# Patient Record
Sex: FEMALE | Race: WHITE | NOT HISPANIC OR LATINO | Employment: OTHER | URBAN - NONMETROPOLITAN AREA
[De-identification: names, ages, dates, MRNs, and addresses within clinical notes are randomized per-mention and may not be internally consistent; named-entity substitution may affect disease eponyms.]

---

## 2017-01-04 DIAGNOSIS — I73.9 PVD (PERIPHERAL VASCULAR DISEASE) (HCC): Primary | ICD-10-CM

## 2017-01-04 DIAGNOSIS — I65.23 BILATERAL CAROTID ARTERY STENOSIS: ICD-10-CM

## 2017-01-07 ENCOUNTER — TRANSCRIBE ORDERS (OUTPATIENT)
Dept: CARDIAC SURGERY | Facility: CLINIC | Age: 82
End: 2017-01-07

## 2017-01-07 DIAGNOSIS — I65.23 BILATERAL CAROTID ARTERY OCCLUSION: ICD-10-CM

## 2017-01-07 DIAGNOSIS — I73.9 PERIPHERAL VASCULAR DISEASE, UNSPECIFIED (HCC): Primary | ICD-10-CM

## 2017-01-10 ENCOUNTER — HOSPITAL ENCOUNTER (INPATIENT)
Dept: OTHER | Facility: HOSPITAL | Age: 82
LOS: 10 days | Discharge: HOME HEALTH | End: 2017-01-20
Attending: FAMILY MEDICINE | Admitting: HOSPITALIST

## 2017-01-11 LAB
ANION GAP SERPL CALCULATED.3IONS-SCNC: 6 MMOL/L (ref 5–15)
BASOPHILS # BLD AUTO: 0.04 X1000/UL (ref 0–0.2)
BASOPHILS NFR BLD AUTO: 0.4 % (ref 0–2)
BUN SERPL-MCNC: 10 MG/DL (ref 7–21)
CALCIUM SERPL-MCNC: 8.1 MG/DL (ref 8.4–10.2)
CHLORIDE SERPL-SCNC: 96 MMOL/L (ref 95–110)
CO2 SERPL-SCNC: 28 MMOL/L (ref 22–31)
CONV AUTO IG#: 0.08 X1000/UL (ref 0.01–0.02)
CONV AUTO IG%: 0.9 % (ref 0–0.5)
CONV TOTAL COUNTED: 100
CREAT SERPL-MCNC: 0.5 MG/DL (ref 0.5–1)
EOSINOPHIL # BLD AUTO: 0.49 X1000/UL (ref 0–0.7)
EOSINOPHIL NFR BLD AUTO: 5.5 % (ref 0–7)
EOSINOPHIL NFR BLD MANUAL: 4 % (ref 0–7)
ERYTHROCYTE [DISTWIDTH] IN BLOOD: 12.3 % (ref 11.5–14.5)
GLUCOSE SERPL-MCNC: 120 MG/DL (ref 60–100)
GRANULOCYTES # BLD AUTO: 5.54 X1000/UL (ref 2–8.6)
GRANULOCYTES NFR BLD AUTO: 62.4 % (ref 37–80)
HCT VFR BLD CALC: 27.9 % (ref 35–45)
HGB BLD-MCNC: 9.8 GM/DL (ref 12–15.5)
LYMPHOCYTES # BLD AUTO: 1.71 X1000/UL (ref 0.6–4.2)
LYMPHOCYTES NFR BLD AUTO: 19.2 % (ref 10–50)
LYMPHOCYTES NFR BLD MANUAL: 20 % (ref 10–50)
Lab: NORMAL
MCH RBC QN: 31.6 PG (ref 26–34)
MCHC RBC-ENTMCNC: 35.1 GM/DL (ref 31.4–36)
MCV RBC: 90 FL (ref 80–98)
MONOCYTES # BLD AUTO: 1.03 X1000/UL (ref 0–0.9)
MONOCYTES NFR BLD AUTO: 11.6 % (ref 0–12)
MONOCYTES NFR BLD MANUAL: 10 % (ref 0–12)
NEUTS BAND NFR BLD MANUAL: 5 % (ref 3–5)
NEUTS SEG NFR BLD MANUAL: 61 % (ref 37–80)
NRBC BLD AUTO-RTO: 0 % (ref 0–0.2)
NRBC SPEC MANUAL: 0 X1000/UL
PLATELET # BLD: 381 X1000/MM3 (ref 150–450)
PLATELET BLD QL SMEAR: NORMAL
PMV BLD: 9.2 FL (ref 8–12)
POTASSIUM SERPL-SCNC: 4 MMOL/L (ref 3.5–5.1)
RBC # BLD: 3.1 MEGA/MM3 (ref 3.77–5.16)
RBC MORPH BLD: NORMAL
SODIUM SERPL-SCNC: 130 MMOL/L (ref 137–145)
WBC # BLD: 8.9 X1000/UL (ref 3.2–9.8)

## 2017-01-12 LAB
ANION GAP SERPL CALCULATED.3IONS-SCNC: 8 MMOL/L (ref 5–15)
BASOPHILS # BLD AUTO: 0.04 X1000/UL (ref 0–0.2)
BASOPHILS NFR BLD AUTO: 0.4 % (ref 0–2)
BUN SERPL-MCNC: 8 MG/DL (ref 7–21)
CALCIUM SERPL-MCNC: 8.2 MG/DL (ref 8.4–10.2)
CHLORIDE SERPL-SCNC: 99 MMOL/L (ref 95–110)
CO2 SERPL-SCNC: 26 MMOL/L (ref 22–31)
CONV AUTO IG#: 0.13 X1000/UL (ref 0.01–0.02)
CONV AUTO IG%: 1.2 % (ref 0–0.5)
CONV TOTAL COUNTED: 100
CREAT SERPL-MCNC: 0.5 MG/DL (ref 0.5–1)
EOSINOPHIL # BLD AUTO: 0.37 X1000/UL (ref 0–0.7)
EOSINOPHIL NFR BLD AUTO: 3.4 % (ref 0–7)
EOSINOPHIL NFR BLD MANUAL: 3 % (ref 0–7)
ERYTHROCYTE [DISTWIDTH] IN BLOOD: 12.2 % (ref 11.5–14.5)
GLUCOSE SERPL-MCNC: 110 MG/DL (ref 60–100)
GRANULOCYTES # BLD AUTO: 7.45 X1000/UL (ref 2–8.6)
GRANULOCYTES NFR BLD AUTO: 68 % (ref 37–80)
HCT VFR BLD CALC: 31 % (ref 35–45)
HGB BLD-MCNC: 10.8 GM/DL (ref 12–15.5)
LYMPHOCYTES # BLD AUTO: 1.6 X1000/UL (ref 0.6–4.2)
LYMPHOCYTES NFR BLD AUTO: 14.6 % (ref 10–50)
LYMPHOCYTES NFR BLD MANUAL: 15 % (ref 10–50)
MCH RBC QN: 31.1 PG (ref 26–34)
MCHC RBC-ENTMCNC: 34.8 GM/DL (ref 31.4–36)
MCV RBC: 89.3 FL (ref 80–98)
MONOCYTES # BLD AUTO: 1.36 X1000/UL (ref 0–0.9)
MONOCYTES NFR BLD AUTO: 12.4 % (ref 0–12)
MONOCYTES NFR BLD MANUAL: 6 % (ref 0–12)
MYELOCYTES NFR BLD MANUAL: 1 % (ref 0–0)
NEUTS SEG NFR BLD MANUAL: 75 % (ref 37–80)
PLATELET # BLD: 439 X1000/MM3 (ref 150–450)
PLATELET BLD QL SMEAR: NORMAL
PMV BLD: 9.3 FL (ref 8–12)
POTASSIUM SERPL-SCNC: 3.9 MMOL/L (ref 3.5–5.1)
RBC # BLD: 3.47 MEGA/MM3 (ref 3.77–5.16)
RBC MORPH BLD: ABNORMAL
SODIUM SERPL-SCNC: 133 MMOL/L (ref 137–145)
WBC # BLD: 11 X1000/UL (ref 3.2–9.8)

## 2017-01-13 LAB
ANION GAP SERPL CALCULATED.3IONS-SCNC: 10 MMOL/L (ref 5–15)
BASOPHILS # BLD AUTO: 0.03 X1000/UL (ref 0–0.2)
BASOPHILS NFR BLD AUTO: 0.3 % (ref 0–2)
BASOPHILS NFR BLD MANUAL: 1 % (ref 0–2)
BUN SERPL-MCNC: 9 MG/DL (ref 7–21)
CALCIUM SERPL-MCNC: 8.4 MG/DL (ref 8.4–10.2)
CHLORIDE SERPL-SCNC: 98 MMOL/L (ref 95–110)
CO2 SERPL-SCNC: 26 MMOL/L (ref 22–31)
CONV AUTO IG#: 0.14 X1000/UL (ref 0.01–0.02)
CONV AUTO IG%: 1.4 % (ref 0–0.5)
CONV TOTAL COUNTED: 100
CREAT SERPL-MCNC: 0.6 MG/DL (ref 0.5–1)
EOSINOPHIL # BLD AUTO: 0.35 X1000/UL (ref 0–0.7)
EOSINOPHIL NFR BLD AUTO: 3.4 % (ref 0–7)
EOSINOPHIL NFR BLD MANUAL: 2 % (ref 0–7)
ERYTHROCYTE [DISTWIDTH] IN BLOOD: 12.3 % (ref 11.5–14.5)
GLUCOSE SERPL-MCNC: 121 MG/DL (ref 60–100)
GRANULOCYTES # BLD AUTO: 7.26 X1000/UL (ref 2–8.6)
GRANULOCYTES NFR BLD AUTO: 71.1 % (ref 37–80)
HCT VFR BLD CALC: 29.4 % (ref 35–45)
HGB BLD-MCNC: 10.1 GM/DL (ref 12–15.5)
LYMPHOCYTES # BLD AUTO: 1.34 X1000/UL (ref 0.6–4.2)
LYMPHOCYTES NFR BLD AUTO: 13.1 % (ref 10–50)
LYMPHOCYTES NFR BLD MANUAL: 10 % (ref 10–50)
MCH RBC QN: 31 PG (ref 26–34)
MCHC RBC-ENTMCNC: 34.4 GM/DL (ref 31.4–36)
MCV RBC: 90.2 FL (ref 80–98)
MONOCYTES # BLD AUTO: 1.09 X1000/UL (ref 0–0.9)
MONOCYTES NFR BLD AUTO: 10.7 % (ref 0–12)
MONOCYTES NFR BLD MANUAL: 4 % (ref 0–12)
NEUTS BAND NFR BLD MANUAL: 1 % (ref 3–5)
NEUTS SEG NFR BLD MANUAL: 80 % (ref 37–80)
PLATELET # BLD: 425 X1000/MM3 (ref 150–450)
PLATELET BLD QL SMEAR: NORMAL
PMV BLD: 8.5 FL (ref 8–12)
POTASSIUM SERPL-SCNC: 3.9 MMOL/L (ref 3.5–5.1)
RBC # BLD: 3.26 MEGA/MM3 (ref 3.77–5.16)
RBC MORPH BLD: ABNORMAL
SODIUM SERPL-SCNC: 134 MMOL/L (ref 137–145)
VARIANT LYMPHS NFR BLD MANUAL: 2 % (ref 0–0)
WBC # BLD: 10.2 X1000/UL (ref 3.2–9.8)

## 2017-01-19 LAB
ALBUMIN SERPL-MCNC: 3.5 GM/DL (ref 3.4–4.8)
ALP SERPL-CCNC: 73 U/L (ref 38–126)
ALT SERPL-CCNC: 50 U/L (ref 9–52)
ANION GAP SERPL CALCULATED.3IONS-SCNC: 11 MMOL/L (ref 5–15)
AST SERPL-CCNC: 36 U/L (ref 14–36)
BASOPHILS # BLD AUTO: 0.04 X1000/UL (ref 0–0.2)
BASOPHILS NFR BLD AUTO: 0.5 % (ref 0–2)
BILIRUB SERPL-MCNC: 0.4 MG/DL (ref 0.2–1.3)
BUN SERPL-MCNC: 12 MG/DL (ref 7–21)
CALCIUM SERPL-MCNC: 9.1 MG/DL (ref 8.4–10.2)
CHLORIDE SERPL-SCNC: 95 MMOL/L (ref 95–110)
CO2 SERPL-SCNC: 27 MMOL/L (ref 22–31)
CONV AUTO IG#: 0.22 X1000/UL (ref 0.01–0.02)
CONV AUTO IG%: 2.7 % (ref 0–0.5)
CREAT SERPL-MCNC: 0.7 MG/DL (ref 0.5–1)
EOSINOPHIL # BLD AUTO: 0.42 X1000/UL (ref 0–0.7)
EOSINOPHIL NFR BLD AUTO: 5.1 % (ref 0–7)
ERYTHROCYTE [DISTWIDTH] IN BLOOD: 13 % (ref 11.5–14.5)
GLUCOSE SERPL-MCNC: 116 MG/DL (ref 60–100)
GRANULOCYTES # BLD AUTO: 5.19 X1000/UL (ref 2–8.6)
GRANULOCYTES NFR BLD AUTO: 62.7 % (ref 37–80)
HCT VFR BLD CALC: 34.5 % (ref 35–45)
HGB BLD-MCNC: 11.6 GM/DL (ref 12–15.5)
LYMPHOCYTES # BLD AUTO: 1.49 X1000/UL (ref 0.6–4.2)
LYMPHOCYTES NFR BLD AUTO: 18 % (ref 10–50)
MCH RBC QN: 30.9 PG (ref 26–34)
MCHC RBC-ENTMCNC: 33.6 GM/DL (ref 31.4–36)
MCV RBC: 91.8 FL (ref 80–98)
MONOCYTES # BLD AUTO: 0.91 X1000/UL (ref 0–0.9)
MONOCYTES NFR BLD AUTO: 11 % (ref 0–12)
PLATELET # BLD: 544 X1000/MM3 (ref 150–450)
PMV BLD: 8.9 FL (ref 8–12)
POTASSIUM SERPL-SCNC: 4.3 MMOL/L (ref 3.5–5.1)
PROT SERPL-MCNC: 6.4 GM/DL (ref 6.3–8.6)
RBC # BLD: 3.76 MEGA/MM3 (ref 3.77–5.16)
SODIUM SERPL-SCNC: 133 MMOL/L (ref 137–145)
WBC # BLD: 8.3 X1000/UL (ref 3.2–9.8)

## 2017-01-20 VITALS — WEIGHT: 132.19 LBS | BODY MASS INDEX: 24.96 KG/M2 | HEIGHT: 61 IN

## 2017-01-20 PROBLEM — Z87.81 S/P LEFT HIP FRACTURE: Status: ACTIVE | Noted: 2017-01-20

## 2017-01-20 PROBLEM — Z96.642 HISTORY OF HEMIARTHROPLASTY OF LEFT HIP: Status: ACTIVE | Noted: 2017-01-20

## 2017-01-20 PROBLEM — Z72.0 TOBACCO USE: Chronic | Status: ACTIVE | Noted: 2017-01-20

## 2017-01-20 PROBLEM — I25.10 CAD (CORONARY ARTERY DISEASE): Chronic | Status: ACTIVE | Noted: 2017-01-20

## 2017-01-20 PROBLEM — I10 HTN (HYPERTENSION): Chronic | Status: ACTIVE | Noted: 2017-01-20

## 2017-01-20 PROBLEM — M81.0 OSTEOPOROSIS: Chronic | Status: ACTIVE | Noted: 2017-01-20

## 2017-01-20 RX ORDER — MAGNESIUM HYDROXIDE/ALUMINUM HYDROXICE/SIMETHICONE 120; 1200; 1200 MG/30ML; MG/30ML; MG/30ML
30 SUSPENSION ORAL EVERY 4 HOURS PRN
Status: DISCONTINUED | OUTPATIENT
Start: 2017-01-21 | End: 2017-01-20 | Stop reason: HOSPADM

## 2017-01-20 RX ORDER — POLYETHYLENE GLYCOL 3350 17 G/17G
17 POWDER, FOR SOLUTION ORAL 2 TIMES DAILY
Status: DISCONTINUED | OUTPATIENT
Start: 2017-01-21 | End: 2017-01-20 | Stop reason: HOSPADM

## 2017-01-20 RX ORDER — HYDRALAZINE HYDROCHLORIDE 50 MG/1
50 TABLET, FILM COATED ORAL 3 TIMES DAILY
Status: DISCONTINUED | OUTPATIENT
Start: 2017-01-21 | End: 2017-01-20 | Stop reason: HOSPADM

## 2017-01-20 RX ORDER — DOCUSATE SODIUM 100 MG/1
100 CAPSULE, LIQUID FILLED ORAL 3 TIMES DAILY
Status: DISCONTINUED | OUTPATIENT
Start: 2017-01-21 | End: 2017-01-20 | Stop reason: HOSPADM

## 2017-01-20 RX ORDER — HYDRALAZINE HYDROCHLORIDE 20 MG/ML
10 INJECTION INTRAMUSCULAR; INTRAVENOUS EVERY 6 HOURS PRN
Status: DISCONTINUED | OUTPATIENT
Start: 2017-01-21 | End: 2017-01-20 | Stop reason: HOSPADM

## 2017-01-20 RX ORDER — METOPROLOL TARTRATE 50 MG/1
50 TABLET, FILM COATED ORAL 2 TIMES DAILY WITH MEALS
Status: DISCONTINUED | OUTPATIENT
Start: 2017-01-21 | End: 2017-01-20 | Stop reason: HOSPADM

## 2017-01-20 RX ORDER — NICOTINE 21 MG/24HR
1 PATCH, TRANSDERMAL 24 HOURS TRANSDERMAL EVERY 24 HOURS
Status: DISCONTINUED | OUTPATIENT
Start: 2017-01-21 | End: 2017-01-20 | Stop reason: HOSPADM

## 2017-01-20 RX ORDER — SODIUM CHLORIDE 0.9 % (FLUSH) 0.9 %
3 SYRINGE (ML) INJECTION EVERY 12 HOURS SCHEDULED
Status: DISCONTINUED | OUTPATIENT
Start: 2017-01-21 | End: 2017-01-20 | Stop reason: HOSPADM

## 2017-01-20 RX ORDER — AMLODIPINE BESYLATE 10 MG/1
10 TABLET ORAL DAILY
Refills: 11 | COMMUNITY
Start: 2016-10-21 | End: 2017-02-21 | Stop reason: ALTCHOICE

## 2017-01-20 RX ORDER — HYDROCODONE BITARTRATE AND ACETAMINOPHEN 10; 325 MG/1; MG/1
1 TABLET ORAL EVERY 4 HOURS PRN
Status: DISCONTINUED | OUTPATIENT
Start: 2017-01-21 | End: 2017-01-20 | Stop reason: HOSPADM

## 2017-01-20 RX ORDER — ACETAMINOPHEN 650 MG/1
650 SUPPOSITORY RECTAL EVERY 6 HOURS PRN
Status: DISCONTINUED | OUTPATIENT
Start: 2017-01-21 | End: 2017-01-20 | Stop reason: HOSPADM

## 2017-01-20 RX ORDER — ISOSORBIDE MONONITRATE 30 MG/1
30 TABLET, EXTENDED RELEASE ORAL
Status: DISCONTINUED | OUTPATIENT
Start: 2017-01-21 | End: 2017-01-20 | Stop reason: HOSPADM

## 2017-01-20 RX ORDER — ACETAMINOPHEN 325 MG/1
650 TABLET ORAL EVERY 6 HOURS PRN
Status: DISCONTINUED | OUTPATIENT
Start: 2017-01-21 | End: 2017-01-20 | Stop reason: HOSPADM

## 2017-01-20 RX ORDER — ALBUTEROL SULFATE 2.5 MG/3ML
2.5 SOLUTION RESPIRATORY (INHALATION) EVERY 4 HOURS PRN
Status: DISCONTINUED | OUTPATIENT
Start: 2017-01-21 | End: 2017-01-20 | Stop reason: HOSPADM

## 2017-01-20 RX ORDER — CHLORAL HYDRATE 500 MG
1000 CAPSULE ORAL DAILY
COMMUNITY

## 2017-01-20 RX ORDER — DIPHENHYDRAMINE HCL 25 MG
25 TABLET ORAL 3 TIMES DAILY PRN
Status: DISCONTINUED | OUTPATIENT
Start: 2017-01-21 | End: 2017-01-20 | Stop reason: HOSPADM

## 2017-01-20 RX ORDER — LORATADINE 10 MG/1
10 CAPSULE, LIQUID FILLED ORAL DAILY PRN
COMMUNITY

## 2017-01-20 RX ORDER — CLONIDINE HYDROCHLORIDE 0.1 MG/1
0.1 TABLET ORAL EVERY 12 HOURS SCHEDULED
Status: DISCONTINUED | OUTPATIENT
Start: 2017-01-21 | End: 2017-01-20 | Stop reason: HOSPADM

## 2017-01-20 RX ORDER — ONDANSETRON 2 MG/ML
4 INJECTION INTRAMUSCULAR; INTRAVENOUS EVERY 6 HOURS PRN
Status: DISCONTINUED | OUTPATIENT
Start: 2017-01-21 | End: 2017-01-20 | Stop reason: HOSPADM

## 2017-01-20 RX ORDER — LANOLIN ALCOHOL/MO/W.PET/CERES
1000 CREAM (GRAM) TOPICAL DAILY
COMMUNITY

## 2017-01-20 RX ORDER — GUAIFENESIN/DEXTROMETHORPHAN 100-10MG/5
10 SYRUP ORAL EVERY 6 HOURS PRN
Status: DISCONTINUED | OUTPATIENT
Start: 2017-01-21 | End: 2017-01-20 | Stop reason: HOSPADM

## 2017-01-20 RX ORDER — SODIUM CHLORIDE 0.9 % (FLUSH) 0.9 %
3 SYRINGE (ML) INJECTION AS NEEDED
Status: DISCONTINUED | OUTPATIENT
Start: 2017-01-21 | End: 2017-01-20 | Stop reason: HOSPADM

## 2017-01-30 ENCOUNTER — OFFICE VISIT (OUTPATIENT)
Dept: ORTHOPEDIC SURGERY | Facility: CLINIC | Age: 82
End: 2017-01-30

## 2017-01-30 VITALS — WEIGHT: 132 LBS | HEIGHT: 61 IN | BODY MASS INDEX: 24.92 KG/M2

## 2017-01-30 DIAGNOSIS — Z87.81 S/P LEFT HIP FRACTURE: Primary | ICD-10-CM

## 2017-01-30 DIAGNOSIS — Z96.642 HISTORY OF HEMIARTHROPLASTY OF LEFT HIP: ICD-10-CM

## 2017-01-30 PROCEDURE — 99024 POSTOP FOLLOW-UP VISIT: CPT | Performed by: ORTHOPAEDIC SURGERY

## 2017-01-30 PROCEDURE — 73502 X-RAY EXAM HIP UNI 2-3 VIEWS: CPT | Performed by: ORTHOPAEDIC SURGERY

## 2017-01-30 RX ORDER — HYDRALAZINE HYDROCHLORIDE 25 MG/1
50 TABLET, FILM COATED ORAL 3 TIMES DAILY
COMMUNITY
Start: 2017-01-24

## 2017-01-30 RX ORDER — HYDROCODONE BITARTRATE AND ACETAMINOPHEN 10; 325 MG/1; MG/1
TABLET ORAL
Refills: 0 | COMMUNITY
Start: 2017-01-21 | End: 2017-03-31

## 2017-01-30 RX ORDER — CLONIDINE HYDROCHLORIDE 0.2 MG/1
TABLET ORAL
Refills: 0 | COMMUNITY
Start: 2017-01-20 | End: 2021-10-06

## 2017-01-30 NOTE — PROGRESS NOTES
Procedure   Procedures      xr hip 2v- normal alignment, femoral stem well positioned.  Leg lengths correct.

## 2017-01-30 NOTE — PROGRESS NOTES
Minerva Madrid is a 81 y.o. female is s/p       Chief Complaint   Patient presents with   • Left Hip - Follow-up       HISTORY OF PRESENT ILLNESS:Hemiarthroplasty of the left hip.1/5/17  Ambulating with the therapist.        Allergies   Allergen Reactions   • Amoxicillin    • Erythromycin Base    • Iodine    • Shellfish-Derived Products    • Simvastatin Myalgia         Current Outpatient Prescriptions:   •  amLODIPine (NORVASC) 10 MG tablet, Take 10 mg by mouth Daily., Disp: , Rfl: 11  •  aspirin 81 MG tablet, Take 81 mg by mouth Daily., Disp: , Rfl:   •  CloNIDine (CATAPRES) 0.2 MG tablet, TAKE 1 TABLET BY MOUTH TWICE A DAY, Disp: , Rfl: 0  •  clopidogrel (PLAVIX) 75 MG tablet, Take 75 mg by mouth Daily., Disp: , Rfl:   •  fexofenadine (ALLEGRA) 180 MG tablet, Take 180 mg by mouth Daily., Disp: , Rfl:   •  hydrALAZINE (APRESOLINE) 25 MG tablet, , Disp: , Rfl:   •  HYDROcodone-acetaminophen (NORCO)  MG per tablet, TAKE 1 TABLET BY MOUTH EVERY 6 HOURS AS NEEDED, Disp: , Rfl: 0  •  isosorbide mononitrate (IMDUR) 30 MG 24 hr tablet, Take 30 mg by mouth Daily., Disp: , Rfl:   •  Loratadine 10 MG capsule, Take 10 mg by mouth Daily As Needed. Allergy relief, Disp: , Rfl:   •  metFORMIN (GLUCOPHAGE) 500 MG tablet, Take 500 mg by mouth 2 (Two) Times a Day With Meals., Disp: , Rfl:   •  metoprolol tartrate (LOPRESSOR) 25 MG tablet, Take 25 mg by mouth 2 (Two) Times a Day., Disp: , Rfl: 12  •  METOPROLOL TARTRATE PO, Take 50 mg by mouth 2 (Two) Times a Day., Disp: , Rfl:   •  Omega-3 Fatty Acids (OMEGA-3 FISH OIL) 1000 MG capsule, Take 1,000 mg by mouth Daily., Disp: , Rfl:   •  valsartan-hydrochlorothiazide (DIOVAN-HCT) 160-12.5 MG per tablet, Take 1 tablet by mouth 2 (Two) Times a Day. 1 tablet in morning; .5 tablet in evening, Disp: , Rfl:   •  vitamin B-12 (CYANOCOBALAMIN) 1000 MCG tablet, Place 1,000 mcg under the tongue Daily., Disp: , Rfl:     No fevers or chills.  No nausea or vomiting.      PHYSICAL  EXAMINATION:       Foster Madrid is a 81 y.o. female    Patient is awake and alert, answers questions appropriately and is in no apparent distress.    GAIT:     []  Normal  []  Antalgic    Assistive device: []  None  []  Walker     []  Crutches  []  Cane     []  Wheelchair  []  Stretcher    Ortho Exam    Leg lengths correct.  3/5 left hip flexors. No pain with passive hip range of motion of the left hip.     Xr Hip 2+ Vw Left    Result Date: 1/5/2017  Narrative: [facility]  Patient Name- FOSTER MADRID  ORDERING- LEXY SAMSON  ATTENDING- KRISTOPHER YOUNG MD  REFERRING-    ,     -----------------------  Procedure- HIP INC PELVIS 1 VIEW LT.  History- surgery.  Comparison- None  Findings- Single frontal view of the left hip was obtained. A left hip prosthesis has been placed. There is postoperative gas adjacent to the greater trochanter and the femoral neck. Cutaneous staples overlie the lateral aspect of the hip. A Saldivar catheter is visualized. There is no radiographic evidence of acute fracture or hardware consultation.  Impression- Left hip prosthesis has been placed.  Electronically Signed By- Epifanio Bailey MD On: 2017-01-05 18:31:08    Xr Femur 2 View Left    Result Date: 1/4/2017  Narrative: Exam- Left femur two views  Indication- Status post fall  Findings- Two views. There is a displaced subcapital femoral neck fracture. There is slight superior displacement of the femur in relation to the femoral head. No obvious lytic or destructive lesion.  Impression- Displaced subcapital femoral neck fracture  Electronically Signed By- Pa Salvador MD On: 2017-01-04 01:31:19    Xr Chest 1 View    Result Date: 1/4/2017  Narrative: Exam- AP portable chest  Indication- Chest pain  Comparison- 1/27/2016  Findings- AP portable chest. The bony structures are intact. The cardiomediastinal silhouette is unremarkable. Plaque is present in the aorta. Ossified right paratracheal node. There is a  granuloma present in the right upper lung. Lungs otherwise are clear. No pneumothorax or pleural effusion.  Impression- No acute cardiopulmonary abnormality.  Electronically Signed By- Pa Salvador MD On: 2017-01-04 01:29:47          ASSESSMENT:    Diagnoses and all orders for this visit:    S/p left hip fracture    History of hemiarthroplasty of left hip    Other orders  -     CloNIDine (CATAPRES) 0.2 MG tablet; TAKE 1 TABLET BY MOUTH TWICE A DAY  -     hydrALAZINE (APRESOLINE) 25 MG tablet;   -     HYDROcodone-acetaminophen (NORCO)  MG per tablet; TAKE 1 TABLET BY MOUTH EVERY 6 HOURS AS NEEDED          PLAN    Reviewed posterior hip precautions, sitting too close to the floor, etc.  Ambulate as tolerated.  Reassess in 2 months.      Chauncey Kerns MD

## 2017-01-30 NOTE — MR AVS SNAPSHOT
Minerva Madrid   1/30/2017 10:30 AM   Office Visit    Dept Phone:  [phone]   Encounter #:  [id number]    Provider:  Chauncey Kerns MD   Department:  [facility] ORTHOPEDICS                Your Full Care Plan              Today's Medication Changes          These changes are accurate as of: 1/30/17 12:24 PM.  If you have any questions, ask your nurse or doctor.               Stop taking medication(s)listed here:     KAPVAY 0.1 MG tablet sustained-release 12 hour   Generic drug:  CloNIDine HCl ER   Stopped by:  Chauncey Kerns MD                      Your Updated Medication List          This list is accurate as of: 1/30/17 12:24 PM.  Always use your most recent med list.                amLODIPine 10 MG tablet   Commonly known as:  NORVASC       aspirin 81 MG tablet       CloNIDine 0.2 MG tablet   Commonly known as:  CATAPRES       clopidogrel 75 MG tablet   Commonly known as:  PLAVIX       fexofenadine 180 MG tablet   Commonly known as:  ALLEGRA       hydrALAZINE 25 MG tablet   Commonly known as:  APRESOLINE       HYDROcodone-acetaminophen  MG per tablet   Commonly known as:  NORCO       isosorbide mononitrate 30 MG 24 hr tablet   Commonly known as:  IMDUR       Loratadine 10 MG capsule       metFORMIN 500 MG tablet   Commonly known as:  GLUCOPHAGE       * METOPROLOL TARTRATE PO       * metoprolol tartrate 25 MG tablet   Commonly known as:  LOPRESSOR       Omega-3 Fish Oil 1000 MG capsule       valsartan-hydrochlorothiazide 160-12.5 MG per tablet   Commonly known as:  DIOVAN-HCT       vitamin B-12 1000 MCG tablet   Commonly known as:  CYANOCOBALAMIN       * Notice:  This list has 2 medication(s) that are the same as other medications prescribed for you. Read the directions carefully, and ask your doctor or other care provider to review them with you.            You Were Diagnosed With        Codes Comments    S/p left hip fracture    -   Primary ICD-10-CM: Z87.81  ICD-9-CM: V15.51     History of hemiarthroplasty of left hip     ICD-10-CM: Z96.642  ICD-9-CM: V43.64       Instructions     None    Patient Instructions History      Upcoming Appointments     Visit Type Date Time Department    POST-OP 1/30/2017 10:30 AM [facility] ORTHOPEDIC [facility]AD    XR MAD HIP W OR WO PEL 2-3 VIEW LEFT 1/30/2017 11:00 AM [facility] XRAY ORTHOCARE    BH MAD VASCULAR VT 2/16/2017  1:00 PM [facility] CT VAS SURGERY MAD    OFFICE VISIT 2/21/2017  2:20 PM [facility] CT VAS SURGERY MAD    BH MAD IDALIA 2/21/2017  1:30 PM [facility] CT VAS SURGERY MAD    BH MAD VASCULAR VT 2/21/2017  2:00 PM [facility] CT VAS SURGERY MAD      MyChart Signup     Our records indicate that you have declined [facility] [employer] signup. If you would like to sign up for [employer], please email [email or web address] or call [phone] to obtain an activation code.             Other Info from Your Visit           Your Appointments     Feb 16, 2017  1:00 PM CST   VASCULAR ULTRASOUND VISIT with MAD HVC IDALIA ROOM   [facility] CARDIOTHORACIC AND VASCULAR SURGERY (--)    [address] [place] [address] [address]  [facility] [address] [place] [id number] [phone]            Feb 21, 2017  1:30 PM CST   VASCULAR ULTRASOUND VISIT with MAD HVC VAS ROOM   [facility] CARDIOTHORACIC AND VASCULAR SURGERY (--)    [address] [place] [address] [address]  [facility] [address] [place] [id number] [phone]            Feb 21, 2017  2:00 PM CST   VASCULAR ULTRASOUND VISIT with MAD HVC VAS ROOM   [facility] CARDIOTHORACIC AND VASCULAR SURGERY (--)    [address] [place] [address] [address]  [facility] [address] [place] [id number] [phone]            Feb 21, 2017  2:20 PM CST   Office Visit with Jack L Hamman, MD   [facility] CARDIOTHORACIC AND VASCULAR SURGERY (--)    [address] [place] [address] [address]  [facility] [address] [place] [id number] [phone]           Arrive 15 minutes prior to appointment.             "  Allergies     Amoxicillin Allergy     Erythromycin Base Allergy     Iodine Allergy     Shellfish-derived Products Allergy     Simvastatin Allergy Myalgia      Reason for Visit     Left Hip - Follow-up           Vital Signs     Height Weight Body Mass Index Smoking Status          61\" (154.9 cm) 132 lb (59.9 kg) 24.94 kg/m2 Never Assessed        Problems and Diagnoses Noted     History of hemiarthroplasty of left hip    S/p left hip fracture        "

## 2017-02-21 ENCOUNTER — OFFICE VISIT (OUTPATIENT)
Dept: CARDIAC SURGERY | Facility: CLINIC | Age: 82
End: 2017-02-21

## 2017-02-21 VITALS
HEART RATE: 64 BPM | DIASTOLIC BLOOD PRESSURE: 62 MMHG | SYSTOLIC BLOOD PRESSURE: 155 MMHG | BODY MASS INDEX: 24.73 KG/M2 | TEMPERATURE: 98.2 F | WEIGHT: 131 LBS | HEIGHT: 61 IN | OXYGEN SATURATION: 97 %

## 2017-02-21 DIAGNOSIS — I65.23 CAROTID STENOSIS, ASYMPTOMATIC, BILATERAL: ICD-10-CM

## 2017-02-21 DIAGNOSIS — Z72.0 TOBACCO USE: Primary | Chronic | ICD-10-CM

## 2017-02-21 PROCEDURE — 99213 OFFICE O/P EST LOW 20 MIN: CPT | Performed by: THORACIC SURGERY (CARDIOTHORACIC VASCULAR SURGERY)

## 2017-02-28 NOTE — PROGRESS NOTES
Minerva Madrid  1935            81 y.o.      2/21/2017    Chief Complaint   Patient presents with   • Carotid Artery Disease     1 year follow up    • Peripheral Vascular Disease     PCP Ariana Adkins D.O.  Cardiologist RICK Car  A she has been followed for diffuse vascular disease since 2012.  She has been an inveterate adult lifetime cigarette smoker      ROS retention, dyslipidemia, tobacco abuse    SH   Mastoid surgery years prior  3-27-08 CATARACT LEFT  4-29-09 CARDIAC CATHETERIZATION  6-4-09 RIGHT CAROTID ENDARTERECTOMY PATCH ANGIOPLASTY  7-13-09 CABG  HARRELL TO LAD, VEIN GRAFT POSTERIOR DESCENDING   HEMIARTHROPLASTY LEFT HIP    Current Outpatient Prescriptions:   •  aspirin 81 MG tablet, Take 81 mg by mouth Daily., Disp: , Rfl:   •  CloNIDine (CATAPRES) 0.2 MG tablet, TAKE 1 TABLET BY MOUTH TWICE A DAY, Disp: , Rfl: 0  •  clopidogrel (PLAVIX) 75 MG tablet, Take 75 mg by mouth Daily., Disp: , Rfl:   •  hydrALAZINE (APRESOLINE) 25 MG tablet, , Disp: , Rfl:   •  HYDROcodone-acetaminophen (NORCO)  MG per tablet, TAKE 1 TABLET BY MOUTH EVERY 6 HOURS AS NEEDED, Disp: , Rfl: 0  •  Loratadine 10 MG capsule, Take 10 mg by mouth Daily As Needed. Allergy relief, Disp: , Rfl:   •  metFORMIN (GLUCOPHAGE) 500 MG tablet, Take 500 mg by mouth 2 (Two) Times a Day With Meals., Disp: , Rfl:   •  METOPROLOL TARTRATE PO, Take 50 mg by mouth 2 (Two) Times a Day., Disp: , Rfl:   •  Omega-3 Fatty Acids (OMEGA-3 FISH OIL) 1000 MG capsule, Take 1,000 mg by mouth Daily., Disp: , Rfl:   •  valsartan-hydrochlorothiazide (DIOVAN-HCT) 160-12.5 MG per tablet, Take 1 tablet by mouth 2 (Two) Times a Day. 1 tablet in morning; .5 tablet in evening, Disp: , Rfl:   •  vitamin B-12 (CYANOCOBALAMIN) 1000 MCG tablet, Place 1,000 mcg under the tongue Daily., Disp: , Rfl:     The following portions of the patient's history were reviewed and updated as appropriate: allergies, current medications, past family history, past  "medical history, past social history, past surgical history and problem list.    Physical Exam  Visit Vitals   • /62 (BP Location: Right arm)   • Pulse 64   • Temp 98.2 °F (36.8 °C) (Oral)   • Ht 61\" (154.9 cm)   • Wt 131 lb (59.4 kg)   • SpO2 97%   • BMI 24.75 kg/m2       HEENT: Semi-vertical scar right neck previous right carotid endarterectomy 6-4-09 no masses soft bruits over both sides the neck    CHEST: Clear to auscultation    HEART: Well-healed midline sternotomy incision regular rate and rhythm    ABDOMEN: Flat nontender    EXTREMITIES: Pulses easily palpable dorsalis pedis arteries bilaterally    VASCULAR LAB STUDIES:CAROTID DUPLEX SCAN  (2-21-17)      RIGHT  0-49% (PRIOR RCE)    LEFT 50-69%                                                                        VERTEBRAL FLOW        Antegrade                                   Antegrade                     IDALIA (2-21-17)             RIGHT 0.79               LEFT 0.76  WAVE FORMS             PT     Triphasic                  Triphasic                                       DP      Triphasic                  Triphasic              RADIOLOGY:      Tobacco use [Z72.0]    IMPRESSION:  1.  Good durability right carotid endarterectomy 6/4/09  2.  Moderate stenosis left internal carotid artery asymptomatic with antegrade flow both vertebral arteries  3.  ABIs demonstrate mild to moderate reduction in the IDALIA ratio but with triphasic waveforms recorded throughout both lower extremities claudication and she has is not debilitating                Assessment/Plan  Minerva was seen today for carotid artery disease and peripheral vascular disease.    Diagnoses and all orders for this visit:    Tobacco use    Carotid stenosis, asymptomatic, bilateral                Plan   1.  Return in 1 year carotid duplex scan, IDALIA  2.  Patient once again encouraged to stop smoking but I doubt that will ever occur        "

## 2017-03-31 ENCOUNTER — OFFICE VISIT (OUTPATIENT)
Dept: ORTHOPEDIC SURGERY | Facility: CLINIC | Age: 82
End: 2017-03-31

## 2017-03-31 DIAGNOSIS — Z87.81 S/P LEFT HIP FRACTURE: Primary | ICD-10-CM

## 2017-03-31 DIAGNOSIS — Z96.642 HISTORY OF HEMIARTHROPLASTY OF LEFT HIP: ICD-10-CM

## 2017-03-31 PROCEDURE — 99024 POSTOP FOLLOW-UP VISIT: CPT | Performed by: ORTHOPAEDIC SURGERY

## 2017-03-31 RX ORDER — HYDROCODONE BITARTRATE AND ACETAMINOPHEN 7.5; 325 MG/1; MG/1
1 TABLET ORAL EVERY 6 HOURS PRN
Qty: 70 TABLET | Refills: 0 | Status: SHIPPED | OUTPATIENT
Start: 2017-03-31 | End: 2018-03-20

## 2017-03-31 NOTE — PROGRESS NOTES
Minerva Madrid is a 81 y.o. female returns for     Chief Complaint   Patient presents with   • Left Hip - Follow-up       HISTORY OF PRESENT ILLNESS: Hemiarthroplasty of the left hip.    done on 1/5/2017. Hip is doing good having some back pain.   She is ambulating without significant issues.   She has back pain, but no hip pain.      CONCURRENT MEDICAL HISTORY:    Past Medical History:   Diagnosis Date   • Essential hypertension    • Postmenopausal bleeding     With thickened endometrial stripe        Allergies   Allergen Reactions   • Amoxicillin    • Erythromycin Base    • Iodine    • Shellfish-Derived Products    • Simvastatin Myalgia         Current Outpatient Prescriptions:   •  aspirin 81 MG tablet, Take 81 mg by mouth Daily., Disp: , Rfl:   •  CloNIDine (CATAPRES) 0.2 MG tablet, TAKE 1 TABLET BY MOUTH TWICE A DAY, Disp: , Rfl: 0  •  clopidogrel (PLAVIX) 75 MG tablet, Take 75 mg by mouth Daily., Disp: , Rfl:   •  hydrALAZINE (APRESOLINE) 25 MG tablet, , Disp: , Rfl:   •  HYDROcodone-acetaminophen (NORCO)  MG per tablet, TAKE 1 TABLET BY MOUTH EVERY 6 HOURS AS NEEDED, Disp: , Rfl: 0  •  Loratadine 10 MG capsule, Take 10 mg by mouth Daily As Needed. Allergy relief, Disp: , Rfl:   •  metFORMIN (GLUCOPHAGE) 500 MG tablet, Take 500 mg by mouth 2 (Two) Times a Day With Meals., Disp: , Rfl:   •  METOPROLOL TARTRATE PO, Take 50 mg by mouth 2 (Two) Times a Day., Disp: , Rfl:   •  Omega-3 Fatty Acids (OMEGA-3 FISH OIL) 1000 MG capsule, Take 1,000 mg by mouth Daily., Disp: , Rfl:   •  valsartan-hydrochlorothiazide (DIOVAN-HCT) 160-12.5 MG per tablet, Take 1 tablet by mouth 2 (Two) Times a Day. 1 tablet in morning; .5 tablet in evening, Disp: , Rfl:   •  vitamin B-12 (CYANOCOBALAMIN) 1000 MCG tablet, Place 1,000 mcg under the tongue Daily., Disp: , Rfl:     Past Surgical History:   Procedure Laterality Date   • CARDIAC SURGERY     • CAROTID ENDARTERECTOMY     • MASTOID SURGERY         ROS  No fevers or chills.   No chest pain or shortness of air.  No GI or  disturbances.    PHYSICAL EXAMINATION:       There were no vitals taken for this visit.    Physical Exam    GAIT:     []  Normal  []  Antalgic    Assistive device: []  None  []  Walker     []  Crutches  [x]  Cane     []  Wheelchair  []  Stretcher    Ortho Exam  Incision is healed, no erythema, no drainage.    Ambulating with cane.      No results found.          ASSESSMENT:    Diagnoses and all orders for this visit:    S/p left hip fracture    History of hemiarthroplasty of left hip          PLAN    Continue activity as tolerated.  Ambulate with assistive device.  Return for follow up appointment for back pain.      Chauncey Kerns MD

## 2017-05-05 ENCOUNTER — OFFICE VISIT (OUTPATIENT)
Dept: ORTHOPEDIC SURGERY | Facility: CLINIC | Age: 82
End: 2017-05-05

## 2017-05-05 VITALS — HEIGHT: 61 IN | WEIGHT: 132 LBS | BODY MASS INDEX: 24.92 KG/M2

## 2017-05-05 DIAGNOSIS — G89.29 CHRONIC LOW BACK PAIN, UNSPECIFIED BACK PAIN LATERALITY, WITH SCIATICA PRESENCE UNSPECIFIED: ICD-10-CM

## 2017-05-05 DIAGNOSIS — M54.5 CHRONIC LOW BACK PAIN, UNSPECIFIED BACK PAIN LATERALITY, WITH SCIATICA PRESENCE UNSPECIFIED: ICD-10-CM

## 2017-05-05 DIAGNOSIS — Z87.81 S/P LEFT HIP FRACTURE: Primary | ICD-10-CM

## 2017-05-05 PROBLEM — M54.50 CHRONIC LOW BACK PAIN: Status: ACTIVE | Noted: 2017-05-05

## 2017-05-05 PROCEDURE — 99213 OFFICE O/P EST LOW 20 MIN: CPT | Performed by: ORTHOPAEDIC SURGERY

## 2017-08-23 ENCOUNTER — LAB REQUISITION (OUTPATIENT)
Dept: LAB | Facility: HOSPITAL | Age: 82
End: 2017-08-23

## 2017-08-23 ENCOUNTER — HOSPITAL ENCOUNTER (INPATIENT)
Facility: HOSPITAL | Age: 82
LOS: 4 days | Discharge: HOME HEALTH | End: 2017-08-27
Attending: EMERGENCY MEDICINE | Admitting: FAMILY MEDICINE

## 2017-08-23 DIAGNOSIS — M54.5 CHRONIC LOW BACK PAIN, UNSPECIFIED BACK PAIN LATERALITY, WITH SCIATICA PRESENCE UNSPECIFIED: ICD-10-CM

## 2017-08-23 DIAGNOSIS — M81.0 OSTEOPOROSIS: Chronic | ICD-10-CM

## 2017-08-23 DIAGNOSIS — I10 ESSENTIAL HYPERTENSION: Chronic | ICD-10-CM

## 2017-08-23 DIAGNOSIS — R29.6 REPEATED FALLS: ICD-10-CM

## 2017-08-23 DIAGNOSIS — Z96.642 HISTORY OF HEMIARTHROPLASTY OF LEFT HIP: ICD-10-CM

## 2017-08-23 DIAGNOSIS — G89.29 CHRONIC LOW BACK PAIN, UNSPECIFIED BACK PAIN LATERALITY, WITH SCIATICA PRESENCE UNSPECIFIED: ICD-10-CM

## 2017-08-23 DIAGNOSIS — Z87.81 S/P LEFT HIP FRACTURE: ICD-10-CM

## 2017-08-23 DIAGNOSIS — E87.1 HYPONATREMIA: Primary | ICD-10-CM

## 2017-08-23 DIAGNOSIS — N30.90 CYSTITIS: ICD-10-CM

## 2017-08-23 LAB
ALBUMIN SERPL-MCNC: 4.7 G/DL (ref 3.4–4.8)
ALBUMIN/GLOB SERPL: 1.8 G/DL (ref 1.1–1.8)
ALP SERPL-CCNC: 47 U/L (ref 38–126)
ALT SERPL W P-5'-P-CCNC: 34 U/L (ref 9–52)
ANION GAP SERPL CALCULATED.3IONS-SCNC: 10 MMOL/L (ref 5–15)
AST SERPL-CCNC: 31 U/L (ref 14–36)
BACTERIA UR QL AUTO: ABNORMAL /HPF
BASOPHILS # BLD AUTO: 0.01 10*3/MM3 (ref 0–0.2)
BASOPHILS NFR BLD AUTO: 0.1 % (ref 0–2)
BILIRUB SERPL-MCNC: 0.6 MG/DL (ref 0.2–1.3)
BILIRUB UR QL STRIP: NEGATIVE
BUN BLD-MCNC: 13 MG/DL (ref 7–21)
BUN/CREAT SERPL: 23.6 (ref 7–25)
CALCIUM SPEC-SCNC: 9.6 MG/DL (ref 8.4–10.2)
CHLORIDE SERPL-SCNC: 86 MMOL/L (ref 95–110)
CLARITY UR: CLEAR
CO2 SERPL-SCNC: 25 MMOL/L (ref 22–31)
COLOR UR: YELLOW
CREAT BLD-MCNC: 0.55 MG/DL (ref 0.5–1)
DEPRECATED RDW RBC AUTO: 37.9 FL (ref 36.4–46.3)
EOSINOPHIL # BLD AUTO: 0.12 10*3/MM3 (ref 0–0.7)
EOSINOPHIL NFR BLD AUTO: 1.4 % (ref 0–7)
ERYTHROCYTE [DISTWIDTH] IN BLOOD BY AUTOMATED COUNT: 12 % (ref 11.5–14.5)
GFR SERPL CREATININE-BSD FRML MDRD: 106 ML/MIN/1.73 (ref 39–90)
GLOBULIN UR ELPH-MCNC: 2.6 GM/DL (ref 2.3–3.5)
GLUCOSE BLD-MCNC: 107 MG/DL (ref 60–100)
GLUCOSE UR STRIP-MCNC: NEGATIVE MG/DL
HCT VFR BLD AUTO: 36.6 % (ref 35–45)
HGB BLD-MCNC: 13.5 G/DL (ref 12–15.5)
HGB UR QL STRIP.AUTO: NEGATIVE
HYALINE CASTS UR QL AUTO: ABNORMAL /LPF
IMM GRANULOCYTES # BLD: 0.03 10*3/MM3 (ref 0–0.02)
IMM GRANULOCYTES NFR BLD: 0.3 % (ref 0–0.5)
KETONES UR QL STRIP: NEGATIVE
LEUKOCYTE ESTERASE UR QL STRIP.AUTO: ABNORMAL
LYMPHOCYTES # BLD AUTO: 1.65 10*3/MM3 (ref 0.6–4.2)
LYMPHOCYTES NFR BLD AUTO: 19 % (ref 10–50)
MAGNESIUM SERPL-MCNC: 1.6 MG/DL (ref 1.6–2.3)
MCH RBC QN AUTO: 32.1 PG (ref 26.5–34)
MCHC RBC AUTO-ENTMCNC: 36.9 G/DL (ref 31.4–36)
MCV RBC AUTO: 86.9 FL (ref 80–98)
MONOCYTES # BLD AUTO: 0.93 10*3/MM3 (ref 0–0.9)
MONOCYTES NFR BLD AUTO: 10.7 % (ref 0–12)
NEUTROPHILS # BLD AUTO: 5.95 10*3/MM3 (ref 2–8.6)
NEUTROPHILS NFR BLD AUTO: 68.5 % (ref 37–80)
NITRITE UR QL STRIP: POSITIVE
PH UR STRIP.AUTO: 7.5 [PH] (ref 5–9)
PLATELET # BLD AUTO: 361 10*3/MM3 (ref 150–450)
PMV BLD AUTO: 9.5 FL (ref 8–12)
POTASSIUM BLD-SCNC: 4.4 MMOL/L (ref 3.5–5.1)
PROT SERPL-MCNC: 7.3 G/DL (ref 6.3–8.6)
PROT UR QL STRIP: ABNORMAL
RBC # BLD AUTO: 4.21 10*6/MM3 (ref 3.77–5.16)
RBC # UR: ABNORMAL /HPF
REF LAB TEST METHOD: ABNORMAL
SODIUM BLD-SCNC: 120 MMOL/L (ref 137–145)
SODIUM BLD-SCNC: 121 MMOL/L (ref 137–145)
SP GR UR STRIP: 1.01 (ref 1–1.03)
SQUAMOUS #/AREA URNS HPF: ABNORMAL /HPF
UROBILINOGEN UR QL STRIP: ABNORMAL
WBC NRBC COR # BLD: 8.69 10*3/MM3 (ref 3.2–9.8)
WBC UR QL AUTO: ABNORMAL /HPF

## 2017-08-23 PROCEDURE — 36415 COLL VENOUS BLD VENIPUNCTURE: CPT

## 2017-08-23 PROCEDURE — 87077 CULTURE AEROBIC IDENTIFY: CPT | Performed by: EMERGENCY MEDICINE

## 2017-08-23 PROCEDURE — 93010 ELECTROCARDIOGRAM REPORT: CPT | Performed by: INTERNAL MEDICINE

## 2017-08-23 PROCEDURE — 25010000002 CEFTRIAXONE: Performed by: EMERGENCY MEDICINE

## 2017-08-23 PROCEDURE — 93005 ELECTROCARDIOGRAM TRACING: CPT | Performed by: EMERGENCY MEDICINE

## 2017-08-23 PROCEDURE — 83735 ASSAY OF MAGNESIUM: CPT | Performed by: EMERGENCY MEDICINE

## 2017-08-23 PROCEDURE — 85025 COMPLETE CBC W/AUTO DIFF WBC: CPT | Performed by: EMERGENCY MEDICINE

## 2017-08-23 PROCEDURE — 99284 EMERGENCY DEPT VISIT MOD MDM: CPT

## 2017-08-23 PROCEDURE — 87186 SC STD MICRODIL/AGAR DIL: CPT | Performed by: EMERGENCY MEDICINE

## 2017-08-23 PROCEDURE — 25010000002 HYDRALAZINE PER 20 MG: Performed by: FAMILY MEDICINE

## 2017-08-23 PROCEDURE — 80053 COMPREHEN METABOLIC PANEL: CPT | Performed by: EMERGENCY MEDICINE

## 2017-08-23 PROCEDURE — 87086 URINE CULTURE/COLONY COUNT: CPT | Performed by: EMERGENCY MEDICINE

## 2017-08-23 PROCEDURE — 82962 GLUCOSE BLOOD TEST: CPT

## 2017-08-23 PROCEDURE — 81001 URINALYSIS AUTO W/SCOPE: CPT | Performed by: EMERGENCY MEDICINE

## 2017-08-23 RX ORDER — METOPROLOL TARTRATE 50 MG/1
50 TABLET, FILM COATED ORAL 2 TIMES DAILY
Status: DISCONTINUED | OUTPATIENT
Start: 2017-08-24 | End: 2017-08-27 | Stop reason: HOSPADM

## 2017-08-23 RX ORDER — LANOLIN ALCOHOL/MO/W.PET/CERES
1000 CREAM (GRAM) TOPICAL DAILY
Status: DISCONTINUED | OUTPATIENT
Start: 2017-08-24 | End: 2017-08-27 | Stop reason: HOSPADM

## 2017-08-23 RX ORDER — ACETAMINOPHEN 325 MG/1
650 TABLET ORAL EVERY 4 HOURS PRN
Status: DISCONTINUED | OUTPATIENT
Start: 2017-08-23 | End: 2017-08-27 | Stop reason: HOSPADM

## 2017-08-23 RX ORDER — CLOPIDOGREL BISULFATE 75 MG/1
75 TABLET ORAL DAILY
Status: DISCONTINUED | OUTPATIENT
Start: 2017-08-24 | End: 2017-08-27 | Stop reason: HOSPADM

## 2017-08-23 RX ORDER — ASPIRIN 81 MG/1
81 TABLET, CHEWABLE ORAL DAILY
Status: DISCONTINUED | OUTPATIENT
Start: 2017-08-24 | End: 2017-08-27 | Stop reason: HOSPADM

## 2017-08-23 RX ORDER — SODIUM CHLORIDE 9 MG/ML
75 INJECTION, SOLUTION INTRAVENOUS CONTINUOUS
Status: DISCONTINUED | OUTPATIENT
Start: 2017-08-23 | End: 2017-08-25

## 2017-08-23 RX ORDER — VALSARTAN 160 MG/1
160 TABLET ORAL
Status: DISCONTINUED | OUTPATIENT
Start: 2017-08-24 | End: 2017-08-27 | Stop reason: HOSPADM

## 2017-08-23 RX ORDER — CLONIDINE HYDROCHLORIDE 0.2 MG/1
0.2 TABLET ORAL EVERY 12 HOURS SCHEDULED
Status: DISCONTINUED | OUTPATIENT
Start: 2017-08-23 | End: 2017-08-27 | Stop reason: HOSPADM

## 2017-08-23 RX ORDER — SERTRALINE HYDROCHLORIDE 25 MG/1
25 TABLET, FILM COATED ORAL DAILY
Status: ON HOLD | COMMUNITY
End: 2018-10-27 | Stop reason: ALTCHOICE

## 2017-08-23 RX ORDER — SODIUM CHLORIDE 0.9 % (FLUSH) 0.9 %
10 SYRINGE (ML) INJECTION AS NEEDED
Status: DISCONTINUED | OUTPATIENT
Start: 2017-08-23 | End: 2017-08-27 | Stop reason: HOSPADM

## 2017-08-23 RX ORDER — HYDRALAZINE HYDROCHLORIDE 20 MG/ML
20 INJECTION INTRAMUSCULAR; INTRAVENOUS EVERY 6 HOURS PRN
Status: DISCONTINUED | OUTPATIENT
Start: 2017-08-23 | End: 2017-08-27 | Stop reason: HOSPADM

## 2017-08-23 RX ORDER — DEXTROSE MONOHYDRATE AND SODIUM CHLORIDE 5; .3 G/100ML; G/100ML
125 INJECTION, SOLUTION INTRAVENOUS CONTINUOUS
Status: DISCONTINUED | OUTPATIENT
Start: 2017-08-23 | End: 2017-08-23

## 2017-08-23 RX ORDER — DOCUSATE SODIUM 100 MG/1
100 CAPSULE, LIQUID FILLED ORAL 2 TIMES DAILY
Status: DISCONTINUED | OUTPATIENT
Start: 2017-08-24 | End: 2017-08-27 | Stop reason: HOSPADM

## 2017-08-23 RX ORDER — ONDANSETRON 2 MG/ML
4 INJECTION INTRAMUSCULAR; INTRAVENOUS EVERY 6 HOURS PRN
Status: DISCONTINUED | OUTPATIENT
Start: 2017-08-23 | End: 2017-08-27 | Stop reason: HOSPADM

## 2017-08-23 RX ORDER — ISOSORBIDE MONONITRATE 30 MG/1
30 TABLET, EXTENDED RELEASE ORAL DAILY
Status: DISCONTINUED | OUTPATIENT
Start: 2017-08-24 | End: 2017-08-27 | Stop reason: HOSPADM

## 2017-08-23 RX ORDER — ISOSORBIDE MONONITRATE 30 MG/1
30 TABLET, EXTENDED RELEASE ORAL DAILY
COMMUNITY

## 2017-08-23 RX ORDER — SODIUM CHLORIDE 0.9 % (FLUSH) 0.9 %
1-10 SYRINGE (ML) INJECTION AS NEEDED
Status: DISCONTINUED | OUTPATIENT
Start: 2017-08-23 | End: 2017-08-27 | Stop reason: HOSPADM

## 2017-08-23 RX ADMIN — METFORMIN HYDROCHLORIDE 500 MG: 500 TABLET ORAL at 22:23

## 2017-08-23 RX ADMIN — SODIUM CHLORIDE 125 ML/HR: 900 INJECTION, SOLUTION INTRAVENOUS at 20:46

## 2017-08-23 RX ADMIN — HYDRALAZINE HYDROCHLORIDE 20 MG: 20 INJECTION INTRAMUSCULAR; INTRAVENOUS at 22:23

## 2017-08-23 RX ADMIN — CEFTRIAXONE 1 G: 1 INJECTION, POWDER, FOR SOLUTION INTRAMUSCULAR; INTRAVENOUS at 21:14

## 2017-08-23 RX ADMIN — CLONIDINE HYDROCHLORIDE 0.2 MG: 0.2 TABLET ORAL at 22:23

## 2017-08-24 LAB
ANION GAP SERPL CALCULATED.3IONS-SCNC: 10 MMOL/L (ref 5–15)
ANION GAP SERPL CALCULATED.3IONS-SCNC: 8 MMOL/L (ref 5–15)
BASOPHILS # BLD AUTO: 0.02 10*3/MM3 (ref 0–0.2)
BASOPHILS NFR BLD AUTO: 0.2 % (ref 0–2)
BUN BLD-MCNC: 13 MG/DL (ref 7–21)
BUN BLD-MCNC: 16 MG/DL (ref 7–21)
BUN/CREAT SERPL: 18.3 (ref 7–25)
BUN/CREAT SERPL: 24.2 (ref 7–25)
CALCIUM SPEC-SCNC: 9 MG/DL (ref 8.4–10.2)
CALCIUM SPEC-SCNC: 9.2 MG/DL (ref 8.4–10.2)
CHLORIDE SERPL-SCNC: 91 MMOL/L (ref 95–110)
CHLORIDE SERPL-SCNC: 92 MMOL/L (ref 95–110)
CO2 SERPL-SCNC: 23 MMOL/L (ref 22–31)
CO2 SERPL-SCNC: 26 MMOL/L (ref 22–31)
CREAT BLD-MCNC: 0.66 MG/DL (ref 0.5–1)
CREAT BLD-MCNC: 0.71 MG/DL (ref 0.5–1)
DEPRECATED RDW RBC AUTO: 38.9 FL (ref 36.4–46.3)
EOSINOPHIL # BLD AUTO: 0.15 10*3/MM3 (ref 0–0.7)
EOSINOPHIL NFR BLD AUTO: 1.5 % (ref 0–7)
ERYTHROCYTE [DISTWIDTH] IN BLOOD BY AUTOMATED COUNT: 12.1 % (ref 11.5–14.5)
GFR SERPL CREATININE-BSD FRML MDRD: 79 ML/MIN/1.73 (ref 60–90)
GFR SERPL CREATININE-BSD FRML MDRD: 86 ML/MIN/1.73 (ref 39–90)
GLUCOSE BLD-MCNC: 102 MG/DL (ref 60–100)
GLUCOSE BLD-MCNC: 120 MG/DL (ref 60–100)
GLUCOSE BLDC GLUCOMTR-MCNC: 109 MG/DL (ref 70–130)
GLUCOSE BLDC GLUCOMTR-MCNC: 115 MG/DL (ref 70–130)
GLUCOSE BLDC GLUCOMTR-MCNC: 121 MG/DL (ref 70–130)
GLUCOSE BLDC GLUCOMTR-MCNC: 137 MG/DL (ref 70–130)
GLUCOSE BLDC GLUCOMTR-MCNC: 150 MG/DL (ref 70–130)
HCT VFR BLD AUTO: 35.5 % (ref 35–45)
HGB BLD-MCNC: 13.1 G/DL (ref 12–15.5)
IMM GRANULOCYTES # BLD: 0.03 10*3/MM3 (ref 0–0.02)
IMM GRANULOCYTES NFR BLD: 0.3 % (ref 0–0.5)
LYMPHOCYTES # BLD AUTO: 1.19 10*3/MM3 (ref 0.6–4.2)
LYMPHOCYTES NFR BLD AUTO: 12 % (ref 10–50)
MCH RBC QN AUTO: 32.3 PG (ref 26.5–34)
MCHC RBC AUTO-ENTMCNC: 36.9 G/DL (ref 31.4–36)
MCV RBC AUTO: 87.7 FL (ref 80–98)
MONOCYTES # BLD AUTO: 1.07 10*3/MM3 (ref 0–0.9)
MONOCYTES NFR BLD AUTO: 10.8 % (ref 0–12)
NEUTROPHILS # BLD AUTO: 7.42 10*3/MM3 (ref 2–8.6)
NEUTROPHILS NFR BLD AUTO: 75.2 % (ref 37–80)
NRBC BLD MANUAL-RTO: 0 /100 WBC (ref 0–0)
PLATELET # BLD AUTO: 339 10*3/MM3 (ref 150–450)
PMV BLD AUTO: 9.1 FL (ref 8–12)
POTASSIUM BLD-SCNC: 3.9 MMOL/L (ref 3.5–5.1)
POTASSIUM BLD-SCNC: 3.9 MMOL/L (ref 3.5–5.1)
RBC # BLD AUTO: 4.05 10*6/MM3 (ref 3.77–5.16)
SODIUM BLD-SCNC: 125 MMOL/L (ref 137–145)
WBC NRBC COR # BLD: 9.88 10*3/MM3 (ref 3.2–9.8)

## 2017-08-24 PROCEDURE — 80048 BASIC METABOLIC PNL TOTAL CA: CPT | Performed by: FAMILY MEDICINE

## 2017-08-24 PROCEDURE — 85025 COMPLETE CBC W/AUTO DIFF WBC: CPT | Performed by: FAMILY MEDICINE

## 2017-08-24 PROCEDURE — 82962 GLUCOSE BLOOD TEST: CPT

## 2017-08-24 PROCEDURE — 25010000002 CEFTRIAXONE: Performed by: FAMILY MEDICINE

## 2017-08-24 PROCEDURE — 84295 ASSAY OF SERUM SODIUM: CPT | Performed by: FAMILY MEDICINE

## 2017-08-24 RX ORDER — NICOTINE POLACRILEX 4 MG
15 LOZENGE BUCCAL
Status: DISCONTINUED | OUTPATIENT
Start: 2017-08-24 | End: 2017-08-27 | Stop reason: HOSPADM

## 2017-08-24 RX ORDER — DEXTROSE MONOHYDRATE 25 G/50ML
25 INJECTION, SOLUTION INTRAVENOUS
Status: DISCONTINUED | OUTPATIENT
Start: 2017-08-24 | End: 2017-08-27 | Stop reason: HOSPADM

## 2017-08-24 RX ADMIN — METOPROLOL TARTRATE 50 MG: 50 TABLET ORAL at 08:33

## 2017-08-24 RX ADMIN — ASPIRIN 81 MG 81 MG: 81 TABLET ORAL at 08:34

## 2017-08-24 RX ADMIN — CLONIDINE HYDROCHLORIDE 0.2 MG: 0.2 TABLET ORAL at 21:57

## 2017-08-24 RX ADMIN — METOPROLOL TARTRATE 50 MG: 50 TABLET ORAL at 17:51

## 2017-08-24 RX ADMIN — VALSARTAN 160 MG: 160 TABLET, FILM COATED ORAL at 08:33

## 2017-08-24 RX ADMIN — CLONIDINE HYDROCHLORIDE 0.2 MG: 0.2 TABLET ORAL at 08:33

## 2017-08-24 RX ADMIN — CLOPIDOGREL BISULFATE 75 MG: 75 TABLET ORAL at 08:33

## 2017-08-24 RX ADMIN — METFORMIN HYDROCHLORIDE 500 MG: 500 TABLET ORAL at 08:33

## 2017-08-24 RX ADMIN — ISOSORBIDE MONONITRATE 30 MG: 30 TABLET, EXTENDED RELEASE ORAL at 08:33

## 2017-08-24 RX ADMIN — CEFTRIAXONE 1 G: 1 INJECTION, POWDER, FOR SOLUTION INTRAMUSCULAR; INTRAVENOUS at 21:57

## 2017-08-24 RX ADMIN — SODIUM CHLORIDE 75 ML/HR: 900 INJECTION, SOLUTION INTRAVENOUS at 14:15

## 2017-08-24 RX ADMIN — CYANOCOBALAMIN TAB 1000 MCG 1000 MCG: 1000 TAB at 08:32

## 2017-08-25 LAB
ANION GAP SERPL CALCULATED.3IONS-SCNC: 10 MMOL/L (ref 5–15)
BACTERIA SPEC AEROBE CULT: ABNORMAL
BASOPHILS # BLD AUTO: 0.02 10*3/MM3 (ref 0–0.2)
BASOPHILS NFR BLD AUTO: 0.3 % (ref 0–2)
BUN BLD-MCNC: 11 MG/DL (ref 7–21)
BUN/CREAT SERPL: 20.4 (ref 7–25)
CALCIUM SPEC-SCNC: 9.3 MG/DL (ref 8.4–10.2)
CHLORIDE SERPL-SCNC: 97 MMOL/L (ref 95–110)
CO2 SERPL-SCNC: 23 MMOL/L (ref 22–31)
CREAT BLD-MCNC: 0.54 MG/DL (ref 0.5–1)
DEPRECATED RDW RBC AUTO: 39.4 FL (ref 36.4–46.3)
EOSINOPHIL # BLD AUTO: 0.19 10*3/MM3 (ref 0–0.7)
EOSINOPHIL NFR BLD AUTO: 2.6 % (ref 0–7)
ERYTHROCYTE [DISTWIDTH] IN BLOOD BY AUTOMATED COUNT: 12.2 % (ref 11.5–14.5)
GFR SERPL CREATININE-BSD FRML MDRD: 108 ML/MIN/1.73 (ref 60–90)
GLUCOSE BLD-MCNC: 102 MG/DL (ref 60–100)
GLUCOSE BLDC GLUCOMTR-MCNC: 113 MG/DL (ref 70–130)
GLUCOSE BLDC GLUCOMTR-MCNC: 121 MG/DL (ref 70–130)
GLUCOSE BLDC GLUCOMTR-MCNC: 128 MG/DL (ref 70–130)
GLUCOSE BLDC GLUCOMTR-MCNC: 140 MG/DL (ref 70–130)
HCT VFR BLD AUTO: 34.9 % (ref 35–45)
HGB BLD-MCNC: 12.9 G/DL (ref 12–15.5)
IMM GRANULOCYTES # BLD: 0.02 10*3/MM3 (ref 0–0.02)
IMM GRANULOCYTES NFR BLD: 0.3 % (ref 0–0.5)
LYMPHOCYTES # BLD AUTO: 1.62 10*3/MM3 (ref 0.6–4.2)
LYMPHOCYTES NFR BLD AUTO: 22.5 % (ref 10–50)
MCH RBC QN AUTO: 32.2 PG (ref 26.5–34)
MCHC RBC AUTO-ENTMCNC: 37 G/DL (ref 31.4–36)
MCV RBC AUTO: 87 FL (ref 80–98)
MONOCYTES # BLD AUTO: 0.83 10*3/MM3 (ref 0–0.9)
MONOCYTES NFR BLD AUTO: 11.5 % (ref 0–12)
NEUTROPHILS # BLD AUTO: 4.51 10*3/MM3 (ref 2–8.6)
NEUTROPHILS NFR BLD AUTO: 62.8 % (ref 37–80)
PLATELET # BLD AUTO: 366 10*3/MM3 (ref 150–450)
PMV BLD AUTO: 9.1 FL (ref 8–12)
POTASSIUM BLD-SCNC: 3.6 MMOL/L (ref 3.5–5.1)
RBC # BLD AUTO: 4.01 10*6/MM3 (ref 3.77–5.16)
SODIUM BLD-SCNC: 130 MMOL/L (ref 137–145)
WBC NRBC COR # BLD: 7.19 10*3/MM3 (ref 3.2–9.8)

## 2017-08-25 PROCEDURE — 82962 GLUCOSE BLOOD TEST: CPT

## 2017-08-25 PROCEDURE — 25010000002 HYDRALAZINE PER 20 MG: Performed by: FAMILY MEDICINE

## 2017-08-25 PROCEDURE — 25010000002 CEFTRIAXONE: Performed by: FAMILY MEDICINE

## 2017-08-25 PROCEDURE — 80048 BASIC METABOLIC PNL TOTAL CA: CPT | Performed by: FAMILY MEDICINE

## 2017-08-25 PROCEDURE — 85025 COMPLETE CBC W/AUTO DIFF WBC: CPT | Performed by: FAMILY MEDICINE

## 2017-08-25 RX ORDER — HYDROCHLOROTHIAZIDE 25 MG/1
12.5 TABLET ORAL DAILY
Status: DISCONTINUED | OUTPATIENT
Start: 2017-08-26 | End: 2017-08-27 | Stop reason: HOSPADM

## 2017-08-25 RX ORDER — ENALAPRILAT 2.5 MG/2ML
1.25 INJECTION INTRAVENOUS EVERY 6 HOURS PRN
Status: DISCONTINUED | OUTPATIENT
Start: 2017-08-25 | End: 2017-08-27 | Stop reason: HOSPADM

## 2017-08-25 RX ADMIN — HYDRALAZINE HYDROCHLORIDE 20 MG: 20 INJECTION INTRAMUSCULAR; INTRAVENOUS at 17:51

## 2017-08-25 RX ADMIN — CLOPIDOGREL BISULFATE 75 MG: 75 TABLET ORAL at 08:54

## 2017-08-25 RX ADMIN — ASPIRIN 81 MG 81 MG: 81 TABLET ORAL at 08:54

## 2017-08-25 RX ADMIN — SODIUM CHLORIDE 75 ML/HR: 900 INJECTION, SOLUTION INTRAVENOUS at 17:47

## 2017-08-25 RX ADMIN — ISOSORBIDE MONONITRATE 30 MG: 30 TABLET, EXTENDED RELEASE ORAL at 08:54

## 2017-08-25 RX ADMIN — METOPROLOL TARTRATE 50 MG: 50 TABLET ORAL at 17:44

## 2017-08-25 RX ADMIN — HYDRALAZINE HYDROCHLORIDE 20 MG: 20 INJECTION INTRAMUSCULAR; INTRAVENOUS at 05:14

## 2017-08-25 RX ADMIN — SODIUM CHLORIDE 75 ML/HR: 900 INJECTION, SOLUTION INTRAVENOUS at 07:51

## 2017-08-25 RX ADMIN — DOCUSATE SODIUM 100 MG: 100 CAPSULE, LIQUID FILLED ORAL at 17:44

## 2017-08-25 RX ADMIN — CEFTRIAXONE 1 G: 1 INJECTION, POWDER, FOR SOLUTION INTRAMUSCULAR; INTRAVENOUS at 21:45

## 2017-08-25 RX ADMIN — CLONIDINE HYDROCHLORIDE 0.2 MG: 0.2 TABLET ORAL at 20:26

## 2017-08-25 RX ADMIN — METOPROLOL TARTRATE 50 MG: 50 TABLET ORAL at 08:54

## 2017-08-25 RX ADMIN — CLONIDINE HYDROCHLORIDE 0.2 MG: 0.2 TABLET ORAL at 08:54

## 2017-08-25 RX ADMIN — ENALAPRILAT 1.25 MG: 1.25 INJECTION INTRAVENOUS at 22:12

## 2017-08-25 RX ADMIN — DOCUSATE SODIUM 100 MG: 100 CAPSULE, LIQUID FILLED ORAL at 08:54

## 2017-08-25 RX ADMIN — CYANOCOBALAMIN TAB 1000 MCG 1000 MCG: 1000 TAB at 08:54

## 2017-08-25 RX ADMIN — HYDROCHLOROTHIAZIDE 12.5 MG: 25 TABLET ORAL at 23:06

## 2017-08-25 RX ADMIN — VALSARTAN 160 MG: 160 TABLET, FILM COATED ORAL at 08:54

## 2017-08-26 LAB
ANION GAP SERPL CALCULATED.3IONS-SCNC: 11 MMOL/L (ref 5–15)
BASOPHILS # BLD AUTO: 0.02 10*3/MM3 (ref 0–0.2)
BASOPHILS NFR BLD AUTO: 0.2 % (ref 0–2)
BUN BLD-MCNC: 15 MG/DL (ref 7–21)
BUN/CREAT SERPL: 26.3 (ref 7–25)
CALCIUM SPEC-SCNC: 9.1 MG/DL (ref 8.4–10.2)
CHLORIDE SERPL-SCNC: 95 MMOL/L (ref 95–110)
CO2 SERPL-SCNC: 22 MMOL/L (ref 22–31)
CREAT BLD-MCNC: 0.57 MG/DL (ref 0.5–1)
DEPRECATED RDW RBC AUTO: 39.7 FL (ref 36.4–46.3)
EOSINOPHIL # BLD AUTO: 0.21 10*3/MM3 (ref 0–0.7)
EOSINOPHIL NFR BLD AUTO: 2.3 % (ref 0–7)
ERYTHROCYTE [DISTWIDTH] IN BLOOD BY AUTOMATED COUNT: 12.3 % (ref 11.5–14.5)
GFR SERPL CREATININE-BSD FRML MDRD: 102 ML/MIN/1.73 (ref 60–90)
GLUCOSE BLD-MCNC: 109 MG/DL (ref 60–100)
GLUCOSE BLDC GLUCOMTR-MCNC: 120 MG/DL (ref 70–130)
GLUCOSE BLDC GLUCOMTR-MCNC: 146 MG/DL (ref 70–130)
GLUCOSE BLDC GLUCOMTR-MCNC: 154 MG/DL (ref 70–130)
GLUCOSE BLDC GLUCOMTR-MCNC: 167 MG/DL (ref 70–130)
HCT VFR BLD AUTO: 36.2 % (ref 35–45)
HGB BLD-MCNC: 12.8 G/DL (ref 12–15.5)
IMM GRANULOCYTES # BLD: 0.02 10*3/MM3 (ref 0–0.02)
IMM GRANULOCYTES NFR BLD: 0.2 % (ref 0–0.5)
LYMPHOCYTES # BLD AUTO: 1.48 10*3/MM3 (ref 0.6–4.2)
LYMPHOCYTES NFR BLD AUTO: 16.3 % (ref 10–50)
MCH RBC QN AUTO: 31.4 PG (ref 26.5–34)
MCHC RBC AUTO-ENTMCNC: 35.4 G/DL (ref 31.4–36)
MCV RBC AUTO: 88.7 FL (ref 80–98)
MONOCYTES # BLD AUTO: 1.13 10*3/MM3 (ref 0–0.9)
MONOCYTES NFR BLD AUTO: 12.5 % (ref 0–12)
NEUTROPHILS # BLD AUTO: 6.21 10*3/MM3 (ref 2–8.6)
NEUTROPHILS NFR BLD AUTO: 68.5 % (ref 37–80)
PLATELET # BLD AUTO: 336 10*3/MM3 (ref 150–450)
PMV BLD AUTO: 9.5 FL (ref 8–12)
POTASSIUM BLD-SCNC: 3.6 MMOL/L (ref 3.5–5.1)
RBC # BLD AUTO: 4.08 10*6/MM3 (ref 3.77–5.16)
SODIUM BLD-SCNC: 128 MMOL/L (ref 137–145)
WBC NRBC COR # BLD: 9.07 10*3/MM3 (ref 3.2–9.8)

## 2017-08-26 PROCEDURE — 85025 COMPLETE CBC W/AUTO DIFF WBC: CPT | Performed by: FAMILY MEDICINE

## 2017-08-26 PROCEDURE — 82962 GLUCOSE BLOOD TEST: CPT

## 2017-08-26 PROCEDURE — 25010000002 CEFTRIAXONE: Performed by: FAMILY MEDICINE

## 2017-08-26 PROCEDURE — 80048 BASIC METABOLIC PNL TOTAL CA: CPT | Performed by: FAMILY MEDICINE

## 2017-08-26 RX ADMIN — ISOSORBIDE MONONITRATE 30 MG: 30 TABLET, EXTENDED RELEASE ORAL at 08:45

## 2017-08-26 RX ADMIN — CEFTRIAXONE 1 G: 1 INJECTION, POWDER, FOR SOLUTION INTRAMUSCULAR; INTRAVENOUS at 21:06

## 2017-08-26 RX ADMIN — ASPIRIN 81 MG 81 MG: 81 TABLET ORAL at 08:45

## 2017-08-26 RX ADMIN — CLONIDINE HYDROCHLORIDE 0.2 MG: 0.2 TABLET ORAL at 08:45

## 2017-08-26 RX ADMIN — CLOPIDOGREL BISULFATE 75 MG: 75 TABLET ORAL at 08:45

## 2017-08-26 RX ADMIN — VALSARTAN 160 MG: 160 TABLET, FILM COATED ORAL at 08:45

## 2017-08-26 RX ADMIN — CLONIDINE HYDROCHLORIDE 0.2 MG: 0.2 TABLET ORAL at 21:06

## 2017-08-26 RX ADMIN — METOPROLOL TARTRATE 50 MG: 50 TABLET ORAL at 08:45

## 2017-08-26 RX ADMIN — METOPROLOL TARTRATE 50 MG: 50 TABLET ORAL at 17:49

## 2017-08-26 RX ADMIN — CYANOCOBALAMIN TAB 1000 MCG 1000 MCG: 1000 TAB at 08:45

## 2017-08-27 VITALS
OXYGEN SATURATION: 97 % | SYSTOLIC BLOOD PRESSURE: 149 MMHG | BODY MASS INDEX: 22.7 KG/M2 | RESPIRATION RATE: 18 BRPM | WEIGHT: 123.38 LBS | HEIGHT: 62 IN | DIASTOLIC BLOOD PRESSURE: 69 MMHG | TEMPERATURE: 98.5 F | HEART RATE: 77 BPM

## 2017-08-27 LAB
ANION GAP SERPL CALCULATED.3IONS-SCNC: 9 MMOL/L (ref 5–15)
BASOPHILS # BLD AUTO: 0.02 10*3/MM3 (ref 0–0.2)
BASOPHILS NFR BLD AUTO: 0.2 % (ref 0–2)
BUN BLD-MCNC: 11 MG/DL (ref 7–21)
BUN/CREAT SERPL: 23.4 (ref 7–25)
CALCIUM SPEC-SCNC: 8.9 MG/DL (ref 8.4–10.2)
CHLORIDE SERPL-SCNC: 97 MMOL/L (ref 95–110)
CO2 SERPL-SCNC: 24 MMOL/L (ref 22–31)
CREAT BLD-MCNC: 0.47 MG/DL (ref 0.5–1)
DEPRECATED RDW RBC AUTO: 39.8 FL (ref 36.4–46.3)
EOSINOPHIL # BLD AUTO: 0.25 10*3/MM3 (ref 0–0.7)
EOSINOPHIL NFR BLD AUTO: 2.7 % (ref 0–7)
ERYTHROCYTE [DISTWIDTH] IN BLOOD BY AUTOMATED COUNT: 12.3 % (ref 11.5–14.5)
GFR SERPL CREATININE-BSD FRML MDRD: 127 ML/MIN/1.73 (ref 39–90)
GLUCOSE BLD-MCNC: 112 MG/DL (ref 60–100)
GLUCOSE BLDC GLUCOMTR-MCNC: 132 MG/DL (ref 70–130)
GLUCOSE BLDC GLUCOMTR-MCNC: 180 MG/DL (ref 70–130)
HCT VFR BLD AUTO: 34.9 % (ref 35–45)
HGB BLD-MCNC: 12.6 G/DL (ref 12–15.5)
IMM GRANULOCYTES # BLD: 0.03 10*3/MM3 (ref 0–0.02)
IMM GRANULOCYTES NFR BLD: 0.3 % (ref 0–0.5)
LYMPHOCYTES # BLD AUTO: 1.18 10*3/MM3 (ref 0.6–4.2)
LYMPHOCYTES NFR BLD AUTO: 12.5 % (ref 10–50)
MCH RBC QN AUTO: 32 PG (ref 26.5–34)
MCHC RBC AUTO-ENTMCNC: 36.1 G/DL (ref 31.4–36)
MCV RBC AUTO: 88.6 FL (ref 80–98)
MONOCYTES # BLD AUTO: 1.12 10*3/MM3 (ref 0–0.9)
MONOCYTES NFR BLD AUTO: 11.9 % (ref 0–12)
NEUTROPHILS # BLD AUTO: 6.81 10*3/MM3 (ref 2–8.6)
NEUTROPHILS NFR BLD AUTO: 72.4 % (ref 37–80)
NRBC BLD MANUAL-RTO: 0 /100 WBC (ref 0–0)
PLATELET # BLD AUTO: 341 10*3/MM3 (ref 150–450)
PMV BLD AUTO: 8.8 FL (ref 8–12)
POTASSIUM BLD-SCNC: 3.8 MMOL/L (ref 3.5–5.1)
RBC # BLD AUTO: 3.94 10*6/MM3 (ref 3.77–5.16)
SODIUM BLD-SCNC: 130 MMOL/L (ref 137–145)
WBC NRBC COR # BLD: 9.41 10*3/MM3 (ref 3.2–9.8)

## 2017-08-27 PROCEDURE — 85025 COMPLETE CBC W/AUTO DIFF WBC: CPT | Performed by: FAMILY MEDICINE

## 2017-08-27 PROCEDURE — 82962 GLUCOSE BLOOD TEST: CPT

## 2017-08-27 PROCEDURE — 80048 BASIC METABOLIC PNL TOTAL CA: CPT | Performed by: FAMILY MEDICINE

## 2017-08-27 RX ORDER — VALSARTAN 160 MG/1
160 TABLET ORAL
Qty: 30 TABLET | Refills: 0 | Status: SHIPPED | OUTPATIENT
Start: 2017-08-27 | End: 2021-10-06

## 2017-08-27 RX ORDER — SODIUM CHLORIDE 9 MG/ML
75 INJECTION, SOLUTION INTRAVENOUS CONTINUOUS
Status: DISCONTINUED | OUTPATIENT
Start: 2017-08-27 | End: 2017-08-27 | Stop reason: HOSPADM

## 2017-08-27 RX ADMIN — CLONIDINE HYDROCHLORIDE 0.2 MG: 0.2 TABLET ORAL at 09:11

## 2017-08-27 RX ADMIN — METOPROLOL TARTRATE 50 MG: 50 TABLET ORAL at 09:10

## 2017-08-27 RX ADMIN — ASPIRIN 81 MG 81 MG: 81 TABLET ORAL at 09:10

## 2017-08-27 RX ADMIN — SODIUM CHLORIDE 75 ML/HR: 900 INJECTION, SOLUTION INTRAVENOUS at 07:15

## 2017-08-27 RX ADMIN — CLOPIDOGREL BISULFATE 75 MG: 75 TABLET ORAL at 09:10

## 2017-08-27 RX ADMIN — VALSARTAN 160 MG: 160 TABLET, FILM COATED ORAL at 09:10

## 2017-08-27 RX ADMIN — CYANOCOBALAMIN TAB 1000 MCG 1000 MCG: 1000 TAB at 09:11

## 2017-08-27 RX ADMIN — HYDROCHLOROTHIAZIDE 12.5 MG: 25 TABLET ORAL at 10:49

## 2017-08-27 RX ADMIN — ISOSORBIDE MONONITRATE 30 MG: 30 TABLET, EXTENDED RELEASE ORAL at 09:11

## 2017-10-04 ENCOUNTER — TRANSCRIBE ORDERS (OUTPATIENT)
Dept: PODIATRY | Facility: CLINIC | Age: 82
End: 2017-10-04

## 2017-10-04 DIAGNOSIS — L60.0 INGROWN TOENAIL: Primary | ICD-10-CM

## 2017-10-09 ENCOUNTER — OFFICE VISIT (OUTPATIENT)
Dept: PODIATRY | Facility: CLINIC | Age: 82
End: 2017-10-09

## 2017-10-09 VITALS
BODY MASS INDEX: 22.45 KG/M2 | WEIGHT: 122 LBS | DIASTOLIC BLOOD PRESSURE: 69 MMHG | OXYGEN SATURATION: 98 % | HEIGHT: 62 IN | SYSTOLIC BLOOD PRESSURE: 169 MMHG | HEART RATE: 51 BPM

## 2017-10-09 DIAGNOSIS — E11.42 TYPE 2 DIABETES MELLITUS WITH PERIPHERAL NEUROPATHY (HCC): ICD-10-CM

## 2017-10-09 DIAGNOSIS — Z92.29 HX OF LONG-TERM (CURRENT) USE OF ANTICOAGULANTS: ICD-10-CM

## 2017-10-09 DIAGNOSIS — B35.1 ONYCHOMYCOSIS: Primary | ICD-10-CM

## 2017-10-09 DIAGNOSIS — G89.29 CHRONIC TOE PAIN, BILATERAL: ICD-10-CM

## 2017-10-09 DIAGNOSIS — M79.674 CHRONIC TOE PAIN, BILATERAL: ICD-10-CM

## 2017-10-09 DIAGNOSIS — M79.675 CHRONIC TOE PAIN, BILATERAL: ICD-10-CM

## 2017-10-09 PROCEDURE — 99203 OFFICE O/P NEW LOW 30 MIN: CPT | Performed by: PODIATRIST

## 2017-10-09 PROCEDURE — 11721 DEBRIDE NAIL 6 OR MORE: CPT | Performed by: PODIATRIST

## 2017-10-09 NOTE — PROGRESS NOTES
Minerva Miles Madrid  1935  82 y.o. female   AIDEN Adkins September 2017  BS- 150 per pt     Patient presents today for diabetic foot care.    10/9/2017  Chief Complaint   Patient presents with   • Left Foot - diabetic foot care   • Right Foot - diabetic foot care           History of Present Illness    82-year-old female presents to clinic today for diabetic foot care.  Patient is a type II diabetic states that she does not check her blood sugar.  She states that the strips do not work and her machine.  States that her nails the become long and painful especially when walking.  She describes the pain as dull and achy.  Pain is relieved with debridement.  She also complains of occasional numbness, burning and tingling in her feet.  She denies any injuries or trauma to her feet.  She has no other pedal complaints.         Past Medical History:   Diagnosis Date   • Appetite loss    • Arthritis    • Coronary artery disease    • Coughing    • Diabetes mellitus    • Essential hypertension    • Leg pain    • Muscle weakness    • Postmenopausal bleeding     With thickened endometrial stripe    • Stroke 02/2016         Past Surgical History:   Procedure Laterality Date   • CARDIAC SURGERY     • CAROTID ENDARTERECTOMY     • JOINT REPLACEMENT Left 01/2017    partial hip replacement   • MASTOID SURGERY           History reviewed. No pertinent family history.    Allergies   Allergen Reactions   • Amoxicillin    • Erythromycin Base    • Iodine    • Shellfish-Derived Products    • Simvastatin Myalgia       Social History     Social History   • Marital status:      Spouse name: N/A   • Number of children: N/A   • Years of education: N/A     Occupational History   • Not on file.     Social History Main Topics   • Smoking status: Current Every Day Smoker     Packs/day: 1.00     Years: 56.00     Types: Cigarettes   • Smokeless tobacco: Never Used   • Alcohol use No   • Drug use: No   • Sexual activity: Defer     Other Topics  "Concern   • Not on file     Social History Narrative         Current Outpatient Prescriptions   Medication Sig Dispense Refill   • aspirin 81 MG tablet Take 81 mg by mouth Daily.     • CloNIDine (CATAPRES) 0.2 MG tablet TAKE 1 TABLET BY MOUTH TWICE A DAY  0   • clopidogrel (PLAVIX) 75 MG tablet Take 75 mg by mouth Daily.     • hydrALAZINE (APRESOLINE) 25 MG tablet Take 25 mg by mouth 3 (Three) Times a Day.     • HYDROcodone-acetaminophen (NORCO) 7.5-325 MG per tablet Take 1 tablet by mouth Every 6 (Six) Hours As Needed for Moderate Pain (4-6). 70 tablet 0   • isosorbide mononitrate (IMDUR) 30 MG 24 hr tablet Take 30 mg by mouth Daily.     • Loratadine 10 MG capsule Take 10 mg by mouth Daily As Needed. Allergy relief     • metFORMIN (GLUCOPHAGE) 500 MG tablet Take 500 mg by mouth 2 (Two) Times a Day With Meals.     • METOPROLOL TARTRATE PO Take 50 mg by mouth 2 (Two) Times a Day.     • Omega-3 Fatty Acids (OMEGA-3 FISH OIL) 1000 MG capsule Take 1,000 mg by mouth Daily.     • sertraline (ZOLOFT) 25 MG tablet Take 25 mg by mouth Daily.     • valsartan (DIOVAN) 160 MG tablet Take 1 tablet by mouth Daily. 30 tablet 0   • vitamin B-12 (CYANOCOBALAMIN) 1000 MCG tablet Place 1,000 mcg under the tongue Daily.       No current facility-administered medications for this visit.          OBJECTIVE    /69  Pulse 51  Ht 62\" (157.5 cm)  Wt 122 lb (55.3 kg)  SpO2 98%  BMI 22.31 kg/m2      Review of Systems   Constitutional: Negative for chills and fever.   Cardiovascular: Negative for chest pain.   Gastrointestinal: Negative for constipation, diarrhea, nausea and vomiting.   Skin: Negative for wound. long painful toenails  Musculoskeletal: foot pain      Constitutional: well developed, well nourished    HEENT: Normocephalic and atraumatic, normal hearing    Respiratory: Non labored respirations noted    Cardiovascular:    DP/PT pulses palpable    CFT brisk  to all digits  Skin temp is warm to warm from proximal tibia to " distal digits  Pedal hair growth absent.   No erythema or edema noted     Musculoskeletal:  Muscle strength is 4/5 for all muscle groups tested   ROM of the 1st MTP is full without pain or crepitus   ROM of the ankle joint is full without pain or crepitus    POP to the toenails  Rectus foot type     Dermatological:   Nails 1-5 b/l are slightly thickened, discolored and elongated  Skin is warm, dry and intact    Webspaces 1-4 bilateral are clean, dry and intact.   No subcutaneous nodules or masses noted    No open wounds noted     Neurological:   Protective sensation decreased  Sensation intact to light touch    DTR intact    Psychiatric: A&O x 3 with normal mood and affect. NAD.       Procedures        ASSESSMENT AND PLAN    Minerva was seen today for diabetic foot care and diabetic foot care.    Diagnoses and all orders for this visit:    Onychomycosis    Chronic toe pain, bilateral    Hx of long-term (current) use of anticoagulants    Type 2 diabetes mellitus with peripheral neuropathy    - A diabetic foot screening exam was performed and the patient was educated on the foot complications related to diabetes,  preventative foot care and what signs and symptoms to watch for.  Instructed to contact our office if any foot problems develop before next visit.  - Nails 1 through 5 bilateral were debrided in length and thickness without incident utilizing nail nippers.  - All questions answered  - RTC in 3 months            This document has been electronically signed by Rony Sierra DPM on October 9, 2017 4:22 PM     10/9/2017  4:22 PM    EMR Dragon/Transcription disclaimer:   Much of this encounter note is an electronic transcription/translation of spoken language to printed text. The electronic translation of spoken language may permit erroneous, or at times, nonsensical words or phrases to be inadvertently transcribed; Although I have reviewed the note for such errors, some may still exist.

## 2018-03-16 DIAGNOSIS — R09.89 PULSE WEAKNESS: ICD-10-CM

## 2018-03-16 DIAGNOSIS — I65.23 ASYMPTOMATIC BILATERAL CAROTID ARTERY STENOSIS: Primary | ICD-10-CM

## 2018-03-20 ENCOUNTER — OFFICE VISIT (OUTPATIENT)
Dept: CARDIAC SURGERY | Facility: CLINIC | Age: 83
End: 2018-03-20

## 2018-03-20 VITALS
OXYGEN SATURATION: 98 % | WEIGHT: 125 LBS | HEART RATE: 50 BPM | HEIGHT: 62 IN | TEMPERATURE: 98.1 F | SYSTOLIC BLOOD PRESSURE: 150 MMHG | BODY MASS INDEX: 23 KG/M2 | DIASTOLIC BLOOD PRESSURE: 85 MMHG

## 2018-03-20 DIAGNOSIS — Z72.0 TOBACCO USE: Chronic | ICD-10-CM

## 2018-03-20 DIAGNOSIS — I73.9 PVD (PERIPHERAL VASCULAR DISEASE) (HCC): Chronic | ICD-10-CM

## 2018-03-20 DIAGNOSIS — I65.22 ASYMPTOMATIC STENOSIS OF LEFT CAROTID ARTERY: Primary | ICD-10-CM

## 2018-03-20 PROCEDURE — 99213 OFFICE O/P EST LOW 20 MIN: CPT | Performed by: THORACIC SURGERY (CARDIOTHORACIC VASCULAR SURGERY)

## 2018-03-20 RX ORDER — SODIUM CHLORIDE 1000 MG
TABLET, SOLUBLE MISCELLANEOUS
Refills: 2 | COMMUNITY
Start: 2018-02-06 | End: 2021-10-06

## 2018-03-22 PROBLEM — Z87.81 S/P LEFT HIP FRACTURE: Status: RESOLVED | Noted: 2017-01-20 | Resolved: 2018-03-22

## 2018-03-22 NOTE — PROGRESS NOTES
Minerva Madrid  1935            82 y.o.      3/20/2018    Chief Complaint   Patient presents with   • Carotid Artery Disease     1 year follow up    • Peripheral Vascular Disease    PCP ROSAURA CARRASCO DO  CARDIOLOGY SARAH BLUM MD  82-year-old woman has been followed for diabetes vascular disease since 2012.  She remains an adult lifetime inveterate  cigarette smoker    HPI  No new complaints since last visit      ROS   Hypertension, dyslipidemia, tobacco abuse  SURGICAL HISTORY:  Mastoid surgery years prior  3-27-08 CATARACT LEFT  4-29-09 CARDIAC CATHETERIZATION  6-4-09 RIGHT CAROTID ENDARTERECTOMY PATCH ANGIOPLASTY  7-13-09 CABG  HARRELL TO LAD, VEIN GRAFT POSTERIOR DESCENDING   HEMIARTHROPLASTY LEFT HIP     Current Outpatient Prescriptions:   •  aspirin 81 MG tablet, Take 81 mg by mouth Daily., Disp: , Rfl:   •  CloNIDine (CATAPRES) 0.2 MG tablet, TAKE 1 TABLET BY MOUTH TWICE A DAY, Disp: , Rfl: 0  •  clopidogrel (PLAVIX) 75 MG tablet, Take 75 mg by mouth Daily., Disp: , Rfl:   •  hydrALAZINE (APRESOLINE) 25 MG tablet, Take 25 mg by mouth 3 (Three) Times a Day., Disp: , Rfl:   •  isosorbide mononitrate (IMDUR) 30 MG 24 hr tablet, Take 30 mg by mouth Daily., Disp: , Rfl:   •  Loratadine 10 MG capsule, Take 10 mg by mouth Daily As Needed. Allergy relief, Disp: , Rfl:   •  metFORMIN (GLUCOPHAGE) 500 MG tablet, Take 500 mg by mouth 2 (Two) Times a Day With Meals., Disp: , Rfl:   •  METOPROLOL TARTRATE PO, Take 50 mg by mouth 2 (Two) Times a Day., Disp: , Rfl:   •  Omega-3 Fatty Acids (OMEGA-3 FISH OIL) 1000 MG capsule, Take 1,000 mg by mouth Daily., Disp: , Rfl:   •  sertraline (ZOLOFT) 25 MG tablet, Take 25 mg by mouth Daily., Disp: , Rfl:   •  valsartan (DIOVAN) 160 MG tablet, Take 1 tablet by mouth Daily., Disp: 30 tablet, Rfl: 0  •  vitamin B-12 (CYANOCOBALAMIN) 1000 MCG tablet, Place 1,000 mcg under the tongue Daily., Disp: , Rfl:   •  sodium chloride 1 g tablet, TAKE ONE TABLET BY MOUTH TWICE A WEEK, Disp:  ", Rfl: 2    The following portions of the patient's history were reviewed and updated as appropriate: allergies, current medications, past family history, past medical history, past social history, past surgical history and problem list.        Past Surgical History:   Procedure Laterality Date   • CARDIAC SURGERY     • CAROTID ENDARTERECTOMY     • JOINT REPLACEMENT Left 01/2017    partial hip replacement   • MASTOID SURGERY             Physical Exam  /85 (BP Location: Right arm)   Pulse 50   Temp 98.1 °F (36.7 °C) (Oral)   Ht 157.5 cm (62\")   Wt 56.7 kg (125 lb)   SpO2 98%   BMI 22.86 kg/m²     HEENT: No masses soft bruits over carotid    CHEST: Clear to auscultation well-healed midline sternotomy     HEART: Regular rate and rhythm    ABDOMEN: Nontender    EXTREMITIES: Pulses difficult to palpate at the ankle VASCULAR LAB STUDIES:    NON INVASIVE VASCULAR LABORATORY STUDIES:                    CAROTID DUPLEX SCAN (3-20-18)          RIGHT  0-49%    LEFT 60-69%              VERTEBRAL FLOW                        Antegrade          Antegrade    IDALIA (3-20-18)                      RIGHT  0.88            LEFT 0.85   WAVE FORMS         PT    Triphasic                 Triphasic                                    DP     Triphasic                 Triphasic        RADIOLOGY:      Asymptomatic stenosis of left carotid artery [I65.22]    IMPRESSION:  1.  Good durability right carotid endarterectomy with patch angioplasty 6/4/09  2.  Mildly diminished arterial perfusion both lower extremities by IDALIA ratios but with triphasic waveforms recorded throughout                  Assessment/Plan  Minerva was seen today for carotid artery disease and peripheral vascular disease.    Diagnoses and all orders for this visit:    Asymptomatic stenosis of left carotid artery    Tobacco use    PVD (peripheral vascular disease)                  Return in about 1 year (around 3/20/2019).  Carotid duplex scan, IDALIA            Jack L. Hamman, " MD  3/22/2018

## 2018-04-08 LAB
BH CV LOWER ARTERIAL LEFT ABI RATIO: 0.85
BH CV LOWER ARTERIAL LEFT DORSALIS PEDIS SYS MAX: 143 MMHG
BH CV LOWER ARTERIAL LEFT POST TIBIAL SYS MAX: 136 MMHG
BH CV LOWER ARTERIAL RIGHT ABI RATIO: 0.88
BH CV LOWER ARTERIAL RIGHT DORSALIS PEDIS SYS MAX: 144 MMHG
BH CV LOWER ARTERIAL RIGHT POST TIBIAL SYS MAX: 148 MMHG
BH CV XLRA MEAS CAROTID RIGHT ICA/CCA DIASTOLIC RATIO: 1.1
BH CV XLRA MEAS LEFT DIST CCA EDV: 18 CM/SEC
BH CV XLRA MEAS LEFT DIST CCA PSV: 97 CM/SEC
BH CV XLRA MEAS LEFT DIST ICA EDV: 25 CM/SEC
BH CV XLRA MEAS LEFT DIST ICA PSV: 95 CM/SEC
BH CV XLRA MEAS LEFT ICA/CCA DIASTOLIC RATIO: 2.2
BH CV XLRA MEAS LEFT ICA/CCA RATIO: 1.8
BH CV XLRA MEAS LEFT MID ICA EDV: 39 CM/SEC
BH CV XLRA MEAS LEFT MID ICA PSV: 186 CM/SEC
BH CV XLRA MEAS LEFT PROX CCA EDV: 18 CM/SEC
BH CV XLRA MEAS LEFT PROX CCA PSV: 106 CM/SEC
BH CV XLRA MEAS LEFT PROX ECA EDV: 0 CM/SEC
BH CV XLRA MEAS LEFT PROX ECA PSV: 169 CM/SEC
BH CV XLRA MEAS LEFT PROX ICA EDV: 25 CM/SEC
BH CV XLRA MEAS LEFT PROX ICA PSV: 91 CM/SEC
BH CV XLRA MEAS LEFT VERTEBRAL A EDV: 16 CM/SEC
BH CV XLRA MEAS LEFT VERTEBRAL A PSV: 58 CM/SEC
BH CV XLRA MEAS RIGHT DIST CCA EDV: 14 CM/SEC
BH CV XLRA MEAS RIGHT DIST CCA PSV: 100 CM/SEC
BH CV XLRA MEAS RIGHT DIST ICA EDV: 15 CM/SEC
BH CV XLRA MEAS RIGHT DIST ICA PSV: 73 CM/SEC
BH CV XLRA MEAS RIGHT ICA/CCA RATIO: 0.7
BH CV XLRA MEAS RIGHT MID ICA EDV: 19 CM/SEC
BH CV XLRA MEAS RIGHT MID ICA PSV: 70 CM/SEC
BH CV XLRA MEAS RIGHT PROX CCA EDV: 8 CM/SEC
BH CV XLRA MEAS RIGHT PROX CCA PSV: 82 CM/SEC
BH CV XLRA MEAS RIGHT PROX ECA EDV: 7 CM/SEC
BH CV XLRA MEAS RIGHT PROX ECA PSV: 141 CM/SEC
BH CV XLRA MEAS RIGHT PROX ICA EDV: 10 CM/SEC
BH CV XLRA MEAS RIGHT PROX ICA PSV: 64 CM/SEC
BH CV XLRA MEAS RIGHT VERTEBRAL A EDV: 8 CM/SEC
BH CV XLRA MEAS RIGHT VERTEBRAL A PSV: 38 CM/SEC
UPPER ARTERIAL LEFT ARM BRACHIAL SYS MAX: 146 MMHG
UPPER ARTERIAL RIGHT ARM BRACHIAL SYS MAX: 168 MMHG

## 2018-10-27 ENCOUNTER — APPOINTMENT (OUTPATIENT)
Dept: CT IMAGING | Facility: HOSPITAL | Age: 83
End: 2018-10-27

## 2018-10-27 ENCOUNTER — HOSPITAL ENCOUNTER (OUTPATIENT)
Facility: HOSPITAL | Age: 83
Setting detail: OBSERVATION
Discharge: HOME HEALTH | End: 2018-11-01
Attending: EMERGENCY MEDICINE | Admitting: FAMILY MEDICINE

## 2018-10-27 ENCOUNTER — APPOINTMENT (OUTPATIENT)
Dept: GENERAL RADIOLOGY | Facility: HOSPITAL | Age: 83
End: 2018-10-27

## 2018-10-27 DIAGNOSIS — R77.8 ELEVATED TROPONIN: ICD-10-CM

## 2018-10-27 DIAGNOSIS — Z74.09 IMPAIRED FUNCTIONAL MOBILITY, BALANCE, GAIT, AND ENDURANCE: ICD-10-CM

## 2018-10-27 DIAGNOSIS — I25.10 ATHEROSCLEROSIS OF NATIVE CORONARY ARTERY OF NATIVE HEART, ANGINA PRESENCE UNSPECIFIED: ICD-10-CM

## 2018-10-27 DIAGNOSIS — A41.9 SEPSIS, DUE TO UNSPECIFIED ORGANISM: ICD-10-CM

## 2018-10-27 DIAGNOSIS — Z74.09 IMPAIRED MOBILITY AND ADLS: ICD-10-CM

## 2018-10-27 DIAGNOSIS — I10 ESSENTIAL HYPERTENSION, BENIGN: ICD-10-CM

## 2018-10-27 DIAGNOSIS — R10.30 LOWER ABDOMINAL PAIN: Primary | ICD-10-CM

## 2018-10-27 DIAGNOSIS — I25.810 ATHEROSCLEROSIS OF AUTOLOGOUS VEIN CORONARY ARTERY BYPASS GRAFT, ANGINA PRESENCE UNSPECIFIED: ICD-10-CM

## 2018-10-27 DIAGNOSIS — Z78.9 IMPAIRED MOBILITY AND ADLS: ICD-10-CM

## 2018-10-27 PROBLEM — E87.20 LACTIC ACID ACIDOSIS: Status: ACTIVE | Noted: 2018-10-27

## 2018-10-27 PROBLEM — I16.0 HYPERTENSIVE URGENCY: Status: ACTIVE | Noted: 2018-10-27

## 2018-10-27 LAB
ALBUMIN SERPL-MCNC: 4.6 G/DL (ref 3.4–4.8)
ALBUMIN/GLOB SERPL: 1.4 G/DL (ref 1.1–1.8)
ALP SERPL-CCNC: 62 U/L (ref 38–126)
ALT SERPL W P-5'-P-CCNC: 17 U/L (ref 9–52)
AMYLASE SERPL-CCNC: 137 U/L (ref 50–130)
ANION GAP SERPL CALCULATED.3IONS-SCNC: 15 MMOL/L (ref 5–15)
ANISOCYTOSIS BLD QL: ABNORMAL
AST SERPL-CCNC: 30 U/L (ref 14–36)
BASOPHILS # BLD AUTO: 0.01 10*3/MM3 (ref 0–0.2)
BASOPHILS NFR BLD AUTO: 0 % (ref 0–2)
BILIRUB SERPL-MCNC: 0.9 MG/DL (ref 0.2–1.3)
BUN BLD-MCNC: 14 MG/DL (ref 7–21)
BUN/CREAT SERPL: 22.2 (ref 7–25)
CALCIUM SPEC-SCNC: 9.7 MG/DL (ref 8.4–10.2)
CHLORIDE SERPL-SCNC: 93 MMOL/L (ref 95–110)
CO2 SERPL-SCNC: 22 MMOL/L (ref 22–31)
CREAT BLD-MCNC: 0.63 MG/DL (ref 0.5–1)
D-LACTATE SERPL-SCNC: 1.7 MMOL/L (ref 0.5–2)
D-LACTATE SERPL-SCNC: 3.6 MMOL/L (ref 0.5–2)
DEPRECATED RDW RBC AUTO: 38.5 FL (ref 36.4–46.3)
EOSINOPHIL # BLD AUTO: 0 10*3/MM3 (ref 0–0.7)
EOSINOPHIL NFR BLD AUTO: 0 % (ref 0–7)
ERYTHROCYTE [DISTWIDTH] IN BLOOD BY AUTOMATED COUNT: 12 % (ref 11.5–14.5)
FLUAV AG NPH QL: NEGATIVE
FLUBV AG NPH QL IA: NEGATIVE
GFR SERPL CREATININE-BSD FRML MDRD: 90 ML/MIN/1.73 (ref 39–90)
GLOBULIN UR ELPH-MCNC: 3.3 GM/DL (ref 2.3–3.5)
GLUCOSE BLD-MCNC: 145 MG/DL (ref 60–100)
HCT VFR BLD AUTO: 42.8 % (ref 35–45)
HGB BLD-MCNC: 15.9 G/DL (ref 12–15.5)
HOLD SPECIMEN: NORMAL
HOLD SPECIMEN: NORMAL
IMM GRANULOCYTES # BLD: 0.11 10*3/MM3 (ref 0–0.02)
IMM GRANULOCYTES NFR BLD: 0.5 % (ref 0–0.5)
LIPASE SERPL-CCNC: 24 U/L (ref 23–300)
LYMPHOCYTES # BLD AUTO: 2.18 10*3/MM3 (ref 0.6–4.2)
LYMPHOCYTES # BLD MANUAL: 1.36 10*3/MM3 (ref 0.6–4.2)
LYMPHOCYTES NFR BLD AUTO: 9.6 % (ref 10–50)
LYMPHOCYTES NFR BLD MANUAL: 2 % (ref 0–12)
LYMPHOCYTES NFR BLD MANUAL: 6 % (ref 10–50)
MCH RBC QN AUTO: 32.9 PG (ref 26.5–34)
MCHC RBC AUTO-ENTMCNC: 37.1 G/DL (ref 31.4–36)
MCV RBC AUTO: 88.4 FL (ref 80–98)
MONOCYTES # BLD AUTO: 0.45 10*3/MM3 (ref 0–0.9)
MONOCYTES # BLD AUTO: 0.61 10*3/MM3 (ref 0–0.9)
MONOCYTES NFR BLD AUTO: 2.7 % (ref 0–12)
NEUTROPHILS # BLD AUTO: 19.71 10*3/MM3 (ref 2–8.6)
NEUTROPHILS # BLD AUTO: 20.81 10*3/MM3 (ref 2–8.6)
NEUTROPHILS NFR BLD AUTO: 87.2 % (ref 37–80)
NEUTROPHILS NFR BLD MANUAL: 92 % (ref 37–80)
NEUTS VAC BLD QL SMEAR: ABNORMAL
NRBC BLD MANUAL-RTO: 0 /100 WBC (ref 0–0)
PLAT MORPH BLD: NORMAL
PLATELET # BLD AUTO: 341 10*3/MM3 (ref 150–450)
PMV BLD AUTO: 10.4 FL (ref 8–12)
POTASSIUM BLD-SCNC: 3.8 MMOL/L (ref 3.5–5.1)
PROT SERPL-MCNC: 7.9 G/DL (ref 6.3–8.6)
RBC # BLD AUTO: 4.84 10*6/MM3 (ref 3.77–5.16)
SODIUM BLD-SCNC: 130 MMOL/L (ref 137–145)
TOXIC GRANULATION: ABNORMAL
TROPONIN I SERPL-MCNC: 0.05 NG/ML
TROPONIN I SERPL-MCNC: 0.08 NG/ML
WBC NRBC COR # BLD: 22.62 10*3/MM3 (ref 3.2–9.8)
WHOLE BLOOD HOLD SPECIMEN: NORMAL

## 2018-10-27 PROCEDURE — 87040 BLOOD CULTURE FOR BACTERIA: CPT | Performed by: EMERGENCY MEDICINE

## 2018-10-27 PROCEDURE — 82150 ASSAY OF AMYLASE: CPT | Performed by: FAMILY MEDICINE

## 2018-10-27 PROCEDURE — 93005 ELECTROCARDIOGRAM TRACING: CPT | Performed by: EMERGENCY MEDICINE

## 2018-10-27 PROCEDURE — 96367 TX/PROPH/DG ADDL SEQ IV INF: CPT

## 2018-10-27 PROCEDURE — 83690 ASSAY OF LIPASE: CPT | Performed by: FAMILY MEDICINE

## 2018-10-27 PROCEDURE — G0378 HOSPITAL OBSERVATION PER HR: HCPCS

## 2018-10-27 PROCEDURE — 96365 THER/PROPH/DIAG IV INF INIT: CPT

## 2018-10-27 PROCEDURE — 85007 BL SMEAR W/DIFF WBC COUNT: CPT | Performed by: STUDENT IN AN ORGANIZED HEALTH CARE EDUCATION/TRAINING PROGRAM

## 2018-10-27 PROCEDURE — 71046 X-RAY EXAM CHEST 2 VIEWS: CPT

## 2018-10-27 PROCEDURE — 96368 THER/DIAG CONCURRENT INF: CPT

## 2018-10-27 PROCEDURE — 93010 ELECTROCARDIOGRAM REPORT: CPT | Performed by: INTERNAL MEDICINE

## 2018-10-27 PROCEDURE — 87804 INFLUENZA ASSAY W/OPTIC: CPT | Performed by: STUDENT IN AN ORGANIZED HEALTH CARE EDUCATION/TRAINING PROGRAM

## 2018-10-27 PROCEDURE — 25010000002 ONDANSETRON PER 1 MG: Performed by: FAMILY MEDICINE

## 2018-10-27 PROCEDURE — 96375 TX/PRO/DX INJ NEW DRUG ADDON: CPT

## 2018-10-27 PROCEDURE — 96366 THER/PROPH/DIAG IV INF ADDON: CPT

## 2018-10-27 PROCEDURE — 25010000002 ONDANSETRON PER 1 MG: Performed by: STUDENT IN AN ORGANIZED HEALTH CARE EDUCATION/TRAINING PROGRAM

## 2018-10-27 PROCEDURE — 74176 CT ABD & PELVIS W/O CONTRAST: CPT

## 2018-10-27 PROCEDURE — 80053 COMPREHEN METABOLIC PANEL: CPT | Performed by: STUDENT IN AN ORGANIZED HEALTH CARE EDUCATION/TRAINING PROGRAM

## 2018-10-27 PROCEDURE — 84484 ASSAY OF TROPONIN QUANT: CPT | Performed by: STUDENT IN AN ORGANIZED HEALTH CARE EDUCATION/TRAINING PROGRAM

## 2018-10-27 PROCEDURE — 83605 ASSAY OF LACTIC ACID: CPT | Performed by: STUDENT IN AN ORGANIZED HEALTH CARE EDUCATION/TRAINING PROGRAM

## 2018-10-27 PROCEDURE — 85025 COMPLETE CBC W/AUTO DIFF WBC: CPT | Performed by: STUDENT IN AN ORGANIZED HEALTH CARE EDUCATION/TRAINING PROGRAM

## 2018-10-27 PROCEDURE — 36415 COLL VENOUS BLD VENIPUNCTURE: CPT | Performed by: EMERGENCY MEDICINE

## 2018-10-27 PROCEDURE — 99285 EMERGENCY DEPT VISIT HI MDM: CPT

## 2018-10-27 PROCEDURE — 25010000002 HYDRALAZINE PER 20 MG: Performed by: STUDENT IN AN ORGANIZED HEALTH CARE EDUCATION/TRAINING PROGRAM

## 2018-10-27 PROCEDURE — 25010000002 CEFTRIAXONE: Performed by: STUDENT IN AN ORGANIZED HEALTH CARE EDUCATION/TRAINING PROGRAM

## 2018-10-27 PROCEDURE — 25010000002 VANCOMYCIN 1 G RECONSTITUTED SOLUTION: Performed by: FAMILY MEDICINE

## 2018-10-27 RX ORDER — ISOSORBIDE MONONITRATE 30 MG/1
30 TABLET, EXTENDED RELEASE ORAL DAILY
Status: DISCONTINUED | OUTPATIENT
Start: 2018-10-28 | End: 2018-11-01 | Stop reason: HOSPADM

## 2018-10-27 RX ORDER — SODIUM CHLORIDE 0.9 % (FLUSH) 0.9 %
3-10 SYRINGE (ML) INJECTION AS NEEDED
Status: DISCONTINUED | OUTPATIENT
Start: 2018-10-27 | End: 2018-11-01 | Stop reason: HOSPADM

## 2018-10-27 RX ORDER — ASPIRIN 81 MG/1
324 TABLET, CHEWABLE ORAL ONCE
Status: COMPLETED | OUTPATIENT
Start: 2018-10-27 | End: 2018-10-27

## 2018-10-27 RX ORDER — SERTRALINE HYDROCHLORIDE 25 MG/1
25 TABLET, FILM COATED ORAL DAILY
Status: DISCONTINUED | OUTPATIENT
Start: 2018-10-28 | End: 2018-10-28

## 2018-10-27 RX ORDER — NALOXONE HCL 0.4 MG/ML
0.4 VIAL (ML) INJECTION
Status: DISCONTINUED | OUTPATIENT
Start: 2018-10-27 | End: 2018-11-01 | Stop reason: HOSPADM

## 2018-10-27 RX ORDER — CETIRIZINE HYDROCHLORIDE 5 MG/1
5 TABLET ORAL DAILY
Status: DISCONTINUED | OUTPATIENT
Start: 2018-10-28 | End: 2018-11-01 | Stop reason: HOSPADM

## 2018-10-27 RX ORDER — SODIUM CHLORIDE 9 MG/ML
125 INJECTION, SOLUTION INTRAVENOUS CONTINUOUS
Status: DISCONTINUED | OUTPATIENT
Start: 2018-10-27 | End: 2018-10-29

## 2018-10-27 RX ORDER — HYDRALAZINE HYDROCHLORIDE 20 MG/ML
10 INJECTION INTRAMUSCULAR; INTRAVENOUS ONCE
Status: COMPLETED | OUTPATIENT
Start: 2018-10-27 | End: 2018-10-27

## 2018-10-27 RX ORDER — SODIUM CHLORIDE 1000 MG
1 TABLET, SOLUBLE MISCELLANEOUS DAILY
Status: DISCONTINUED | OUTPATIENT
Start: 2018-10-28 | End: 2018-10-28

## 2018-10-27 RX ORDER — SODIUM CHLORIDE 0.9 % (FLUSH) 0.9 %
10 SYRINGE (ML) INJECTION AS NEEDED
Status: DISCONTINUED | OUTPATIENT
Start: 2018-10-27 | End: 2018-10-27 | Stop reason: SDUPTHER

## 2018-10-27 RX ORDER — MORPHINE SULFATE 2 MG/ML
1 INJECTION, SOLUTION INTRAMUSCULAR; INTRAVENOUS EVERY 4 HOURS PRN
Status: DISCONTINUED | OUTPATIENT
Start: 2018-10-27 | End: 2018-11-01 | Stop reason: HOSPADM

## 2018-10-27 RX ORDER — ONDANSETRON 4 MG/1
4 TABLET, FILM COATED ORAL EVERY 6 HOURS PRN
Status: DISCONTINUED | OUTPATIENT
Start: 2018-10-27 | End: 2018-11-01 | Stop reason: HOSPADM

## 2018-10-27 RX ORDER — PROMETHAZINE HYDROCHLORIDE 25 MG/ML
12.5 INJECTION, SOLUTION INTRAMUSCULAR; INTRAVENOUS EVERY 6 HOURS PRN
Status: DISCONTINUED | OUTPATIENT
Start: 2018-10-27 | End: 2018-11-01 | Stop reason: HOSPADM

## 2018-10-27 RX ORDER — METOPROLOL TARTRATE 5 MG/5ML
5 INJECTION INTRAVENOUS ONCE
Status: DISCONTINUED | OUTPATIENT
Start: 2018-10-27 | End: 2018-10-27

## 2018-10-27 RX ORDER — CLOPIDOGREL BISULFATE 75 MG/1
75 TABLET ORAL DAILY
Status: DISCONTINUED | OUTPATIENT
Start: 2018-10-28 | End: 2018-11-01 | Stop reason: HOSPADM

## 2018-10-27 RX ORDER — HYDRALAZINE HYDROCHLORIDE 25 MG/1
25 TABLET, FILM COATED ORAL 3 TIMES DAILY
Status: DISCONTINUED | OUTPATIENT
Start: 2018-10-27 | End: 2018-10-29

## 2018-10-27 RX ORDER — ONDANSETRON 2 MG/ML
4 INJECTION INTRAMUSCULAR; INTRAVENOUS ONCE
Status: COMPLETED | OUTPATIENT
Start: 2018-10-27 | End: 2018-10-27

## 2018-10-27 RX ORDER — ASPIRIN 81 MG/1
81 TABLET, CHEWABLE ORAL DAILY
Status: DISCONTINUED | OUTPATIENT
Start: 2018-10-28 | End: 2018-11-01 | Stop reason: HOSPADM

## 2018-10-27 RX ORDER — CLONIDINE HYDROCHLORIDE 0.2 MG/1
0.2 TABLET ORAL 2 TIMES DAILY
Status: DISCONTINUED | OUTPATIENT
Start: 2018-10-27 | End: 2018-11-01 | Stop reason: HOSPADM

## 2018-10-27 RX ORDER — ONDANSETRON 2 MG/ML
4 INJECTION INTRAMUSCULAR; INTRAVENOUS EVERY 6 HOURS PRN
Status: DISCONTINUED | OUTPATIENT
Start: 2018-10-27 | End: 2018-11-01 | Stop reason: HOSPADM

## 2018-10-27 RX ORDER — VALSARTAN 160 MG/1
160 TABLET ORAL
Status: DISCONTINUED | OUTPATIENT
Start: 2018-10-28 | End: 2018-10-28

## 2018-10-27 RX ORDER — SODIUM CHLORIDE 0.9 % (FLUSH) 0.9 %
3 SYRINGE (ML) INJECTION EVERY 12 HOURS SCHEDULED
Status: DISCONTINUED | OUTPATIENT
Start: 2018-10-27 | End: 2018-11-01 | Stop reason: HOSPADM

## 2018-10-27 RX ORDER — METOPROLOL TARTRATE 50 MG/1
50 TABLET, FILM COATED ORAL EVERY 12 HOURS SCHEDULED
Status: DISCONTINUED | OUTPATIENT
Start: 2018-10-27 | End: 2018-11-01 | Stop reason: HOSPADM

## 2018-10-27 RX ORDER — ONDANSETRON 4 MG/1
4 TABLET, ORALLY DISINTEGRATING ORAL EVERY 6 HOURS PRN
Status: DISCONTINUED | OUTPATIENT
Start: 2018-10-27 | End: 2018-11-01 | Stop reason: HOSPADM

## 2018-10-27 RX ORDER — HYDRALAZINE HYDROCHLORIDE 20 MG/ML
20 INJECTION INTRAMUSCULAR; INTRAVENOUS ONCE
Status: DISCONTINUED | OUTPATIENT
Start: 2018-10-27 | End: 2018-10-27

## 2018-10-27 RX ADMIN — SODIUM CHLORIDE 100 ML/HR: 9 INJECTION, SOLUTION INTRAVENOUS at 21:47

## 2018-10-27 RX ADMIN — ONDANSETRON 4 MG: 2 INJECTION INTRAMUSCULAR; INTRAVENOUS at 21:53

## 2018-10-27 RX ADMIN — VANCOMYCIN HYDROCHLORIDE 1000 MG: 1 INJECTION, POWDER, LYOPHILIZED, FOR SOLUTION INTRAVENOUS at 21:47

## 2018-10-27 RX ADMIN — CEFTRIAXONE 1 G: 1 INJECTION, POWDER, FOR SOLUTION INTRAMUSCULAR; INTRAVENOUS at 18:48

## 2018-10-27 RX ADMIN — SODIUM CHLORIDE 2 G: 900 INJECTION INTRAVENOUS at 21:48

## 2018-10-27 RX ADMIN — ONDANSETRON 4 MG: 2 INJECTION INTRAMUSCULAR; INTRAVENOUS at 16:29

## 2018-10-27 RX ADMIN — HYDRALAZINE HYDROCHLORIDE 10 MG: 20 INJECTION INTRAMUSCULAR; INTRAVENOUS at 18:34

## 2018-10-27 RX ADMIN — ASPIRIN 81 MG CHEWABLE TABLET 324 MG: 81 TABLET CHEWABLE at 18:06

## 2018-10-27 RX ADMIN — HYDRALAZINE HYDROCHLORIDE 10 MG: 20 INJECTION INTRAMUSCULAR; INTRAVENOUS at 18:09

## 2018-10-27 RX ADMIN — LABETALOL HYDROCHLORIDE 0.5 MG/MIN: 5 INJECTION, SOLUTION INTRAVENOUS at 19:57

## 2018-10-27 RX ADMIN — SODIUM CHLORIDE 1000 ML: 9 INJECTION, SOLUTION INTRAVENOUS at 18:01

## 2018-10-28 LAB
ALBUMIN SERPL-MCNC: 3.3 G/DL (ref 3.4–4.8)
ALBUMIN/GLOB SERPL: 1.3 G/DL (ref 1.1–1.8)
ALP SERPL-CCNC: 39 U/L (ref 38–126)
ALT SERPL W P-5'-P-CCNC: 24 U/L (ref 9–52)
ANION GAP SERPL CALCULATED.3IONS-SCNC: 7 MMOL/L (ref 5–15)
AST SERPL-CCNC: 26 U/L (ref 14–36)
BACTERIA UR QL AUTO: ABNORMAL /HPF
BASOPHILS # BLD AUTO: 0 10*3/MM3 (ref 0–0.2)
BASOPHILS NFR BLD AUTO: 0 % (ref 0–2)
BILIRUB SERPL-MCNC: 0.5 MG/DL (ref 0.2–1.3)
BILIRUB UR QL STRIP: NEGATIVE
BUN BLD-MCNC: 13 MG/DL (ref 7–21)
BUN/CREAT SERPL: 24.5 (ref 7–25)
CALCIUM SPEC-SCNC: 8.4 MG/DL (ref 8.4–10.2)
CHLORIDE SERPL-SCNC: 99 MMOL/L (ref 95–110)
CLARITY UR: CLEAR
CO2 SERPL-SCNC: 27 MMOL/L (ref 22–31)
COLOR UR: YELLOW
CREAT BLD-MCNC: 0.53 MG/DL (ref 0.5–1)
DEPRECATED RDW RBC AUTO: 39.6 FL (ref 36.4–46.3)
EOSINOPHIL # BLD AUTO: 0.04 10*3/MM3 (ref 0–0.7)
EOSINOPHIL NFR BLD AUTO: 0.4 % (ref 0–7)
ERYTHROCYTE [DISTWIDTH] IN BLOOD BY AUTOMATED COUNT: 12.2 % (ref 11.5–14.5)
GFR SERPL CREATININE-BSD FRML MDRD: 110 ML/MIN/1.73 (ref 39–90)
GLOBULIN UR ELPH-MCNC: 2.5 GM/DL (ref 2.3–3.5)
GLUCOSE BLD-MCNC: 137 MG/DL (ref 60–100)
GLUCOSE UR STRIP-MCNC: ABNORMAL MG/DL
HCT VFR BLD AUTO: 34.3 % (ref 35–45)
HGB BLD-MCNC: 12.3 G/DL (ref 12–15.5)
HGB UR QL STRIP.AUTO: NEGATIVE
HYALINE CASTS UR QL AUTO: ABNORMAL /LPF
IMM GRANULOCYTES # BLD: 0.05 10*3/MM3 (ref 0–0.02)
IMM GRANULOCYTES NFR BLD: 0.5 % (ref 0–0.5)
KETONES UR QL STRIP: ABNORMAL
LEUKOCYTE ESTERASE UR QL STRIP.AUTO: NEGATIVE
LYMPHOCYTES # BLD AUTO: 0.85 10*3/MM3 (ref 0.6–4.2)
LYMPHOCYTES NFR BLD AUTO: 8.4 % (ref 10–50)
MCH RBC QN AUTO: 32 PG (ref 26.5–34)
MCHC RBC AUTO-ENTMCNC: 35.9 G/DL (ref 31.4–36)
MCV RBC AUTO: 89.3 FL (ref 80–98)
MONOCYTES # BLD AUTO: 0.87 10*3/MM3 (ref 0–0.9)
MONOCYTES NFR BLD AUTO: 8.6 % (ref 0–12)
NEUTROPHILS # BLD AUTO: 8.32 10*3/MM3 (ref 2–8.6)
NEUTROPHILS NFR BLD AUTO: 82.1 % (ref 37–80)
NITRITE UR QL STRIP: NEGATIVE
NRBC BLD MANUAL-RTO: 0 /100 WBC (ref 0–0)
PH UR STRIP.AUTO: 6.5 [PH] (ref 5–9)
PLATELET # BLD AUTO: 264 10*3/MM3 (ref 150–450)
PMV BLD AUTO: 10.5 FL (ref 8–12)
POTASSIUM BLD-SCNC: 3.2 MMOL/L (ref 3.5–5.1)
PROT SERPL-MCNC: 5.8 G/DL (ref 6.3–8.6)
PROT UR QL STRIP: ABNORMAL
RBC # BLD AUTO: 3.84 10*6/MM3 (ref 3.77–5.16)
RBC # UR: ABNORMAL /HPF
REF LAB TEST METHOD: ABNORMAL
SODIUM BLD-SCNC: 133 MMOL/L (ref 137–145)
SP GR UR STRIP: 1.02 (ref 1–1.03)
SQUAMOUS #/AREA URNS HPF: ABNORMAL /HPF
TROPONIN I SERPL-MCNC: 0.06 NG/ML
TROPONIN I SERPL-MCNC: 0.08 NG/ML
UROBILINOGEN UR QL STRIP: ABNORMAL
WBC NRBC COR # BLD: 10.13 10*3/MM3 (ref 3.2–9.8)
WBC UR QL AUTO: ABNORMAL /HPF

## 2018-10-28 PROCEDURE — 25010000002 VANCOMYCIN 1 G RECONSTITUTED SOLUTION: Performed by: FAMILY MEDICINE

## 2018-10-28 PROCEDURE — 85025 COMPLETE CBC W/AUTO DIFF WBC: CPT | Performed by: FAMILY MEDICINE

## 2018-10-28 PROCEDURE — 80053 COMPREHEN METABOLIC PANEL: CPT | Performed by: FAMILY MEDICINE

## 2018-10-28 PROCEDURE — 51702 INSERT TEMP BLADDER CATH: CPT

## 2018-10-28 PROCEDURE — 96361 HYDRATE IV INFUSION ADD-ON: CPT

## 2018-10-28 PROCEDURE — 25010000002 PROMETHAZINE PER 50 MG: Performed by: FAMILY MEDICINE

## 2018-10-28 PROCEDURE — G0378 HOSPITAL OBSERVATION PER HR: HCPCS

## 2018-10-28 PROCEDURE — 81001 URINALYSIS AUTO W/SCOPE: CPT | Performed by: FAMILY MEDICINE

## 2018-10-28 PROCEDURE — 96366 THER/PROPH/DIAG IV INF ADDON: CPT

## 2018-10-28 PROCEDURE — 96375 TX/PRO/DX INJ NEW DRUG ADDON: CPT

## 2018-10-28 PROCEDURE — 84484 ASSAY OF TROPONIN QUANT: CPT | Performed by: INTERNAL MEDICINE

## 2018-10-28 RX ORDER — POTASSIUM CHLORIDE 1.5 G/1.77G
40 POWDER, FOR SOLUTION ORAL AS NEEDED
Status: DISCONTINUED | OUTPATIENT
Start: 2018-10-28 | End: 2018-11-01 | Stop reason: HOSPADM

## 2018-10-28 RX ORDER — SODIUM CHLORIDE 1000 MG
1 TABLET, SOLUBLE MISCELLANEOUS
Status: DISCONTINUED | OUTPATIENT
Start: 2018-10-28 | End: 2018-11-01 | Stop reason: HOSPADM

## 2018-10-28 RX ORDER — POTASSIUM CHLORIDE 750 MG/1
40 CAPSULE, EXTENDED RELEASE ORAL AS NEEDED
Status: DISCONTINUED | OUTPATIENT
Start: 2018-10-28 | End: 2018-11-01 | Stop reason: HOSPADM

## 2018-10-28 RX ORDER — POTASSIUM CHLORIDE 7.45 MG/ML
10 INJECTION INTRAVENOUS
Status: DISCONTINUED | OUTPATIENT
Start: 2018-10-28 | End: 2018-11-01 | Stop reason: HOSPADM

## 2018-10-28 RX ORDER — VALSARTAN 160 MG/1
160 TABLET ORAL 2 TIMES DAILY
Status: DISCONTINUED | OUTPATIENT
Start: 2018-10-28 | End: 2018-11-01 | Stop reason: HOSPADM

## 2018-10-28 RX ADMIN — AZTREONAM 2 G: 2 INJECTION, POWDER, LYOPHILIZED, FOR SOLUTION INTRAMUSCULAR; INTRAVENOUS at 21:28

## 2018-10-28 RX ADMIN — AZTREONAM 2 G: 2 INJECTION, POWDER, LYOPHILIZED, FOR SOLUTION INTRAMUSCULAR; INTRAVENOUS at 13:44

## 2018-10-28 RX ADMIN — CLOPIDOGREL BISULFATE 75 MG: 75 TABLET ORAL at 09:07

## 2018-10-28 RX ADMIN — LABETALOL HYDROCHLORIDE 1 MG/MIN: 5 INJECTION, SOLUTION INTRAVENOUS at 07:23

## 2018-10-28 RX ADMIN — VALSARTAN 160 MG: 160 TABLET, FILM COATED ORAL at 09:08

## 2018-10-28 RX ADMIN — LABETALOL HYDROCHLORIDE 1 MG/MIN: 5 INJECTION, SOLUTION INTRAVENOUS at 03:28

## 2018-10-28 RX ADMIN — CLONIDINE HYDROCHLORIDE 0.2 MG: 0.2 TABLET ORAL at 09:07

## 2018-10-28 RX ADMIN — PROMETHAZINE HYDROCHLORIDE 12.5 MG: 25 INJECTION INTRAMUSCULAR; INTRAVENOUS at 01:17

## 2018-10-28 RX ADMIN — Medication 3 ML: at 09:00

## 2018-10-28 RX ADMIN — POTASSIUM CHLORIDE 40 MEQ: 1.5 POWDER, FOR SOLUTION ORAL at 13:38

## 2018-10-28 RX ADMIN — HYDRALAZINE HYDROCHLORIDE 25 MG: 25 TABLET, FILM COATED ORAL at 15:43

## 2018-10-28 RX ADMIN — SODIUM CHLORIDE TAB 1 GM 1 G: 1 TAB at 09:08

## 2018-10-28 RX ADMIN — METOPROLOL TARTRATE 50 MG: 50 TABLET ORAL at 09:08

## 2018-10-28 RX ADMIN — VALSARTAN 160 MG: 160 TABLET, FILM COATED ORAL at 21:28

## 2018-10-28 RX ADMIN — HYDRALAZINE HYDROCHLORIDE 25 MG: 25 TABLET, FILM COATED ORAL at 21:28

## 2018-10-28 RX ADMIN — SODIUM CHLORIDE 125 ML/HR: 9 INJECTION, SOLUTION INTRAVENOUS at 22:57

## 2018-10-28 RX ADMIN — ISOSORBIDE MONONITRATE 30 MG: 30 TABLET, EXTENDED RELEASE ORAL at 09:07

## 2018-10-28 RX ADMIN — AZTREONAM 2 G: 2 INJECTION, POWDER, LYOPHILIZED, FOR SOLUTION INTRAMUSCULAR; INTRAVENOUS at 06:23

## 2018-10-28 RX ADMIN — METOPROLOL TARTRATE 50 MG: 50 TABLET ORAL at 21:28

## 2018-10-28 RX ADMIN — CLONIDINE HYDROCHLORIDE 0.2 MG: 0.2 TABLET ORAL at 21:27

## 2018-10-28 RX ADMIN — VANCOMYCIN HYDROCHLORIDE 1000 MG: 1 INJECTION, POWDER, LYOPHILIZED, FOR SOLUTION INTRAVENOUS at 21:28

## 2018-10-28 RX ADMIN — LABETALOL HYDROCHLORIDE 1 MG/MIN: 5 INJECTION, SOLUTION INTRAVENOUS at 06:11

## 2018-10-28 RX ADMIN — CETIRIZINE HYDROCHLORIDE 5 MG: 5 TABLET, FILM COATED ORAL at 09:16

## 2018-10-28 RX ADMIN — HYDRALAZINE HYDROCHLORIDE 25 MG: 25 TABLET, FILM COATED ORAL at 09:08

## 2018-10-28 RX ADMIN — ASPIRIN 81 MG CHEWABLE TABLET 81 MG: 81 TABLET CHEWABLE at 09:07

## 2018-10-28 RX ADMIN — LABETALOL HYDROCHLORIDE 1 MG/MIN: 5 INJECTION, SOLUTION INTRAVENOUS at 00:10

## 2018-10-29 ENCOUNTER — APPOINTMENT (OUTPATIENT)
Dept: CARDIOLOGY | Facility: HOSPITAL | Age: 83
End: 2018-10-29
Attending: INTERNAL MEDICINE

## 2018-10-29 LAB
ANION GAP SERPL CALCULATED.3IONS-SCNC: 4 MMOL/L (ref 5–15)
BASOPHILS # BLD AUTO: 0.02 10*3/MM3 (ref 0–0.2)
BASOPHILS NFR BLD AUTO: 0.2 % (ref 0–2)
BH CV ECHO MEAS - ACS: 1.8 CM
BH CV ECHO MEAS - AO MAX PG (FULL): 2.4 MMHG
BH CV ECHO MEAS - AO MAX PG: 8.5 MMHG
BH CV ECHO MEAS - AO MEAN PG (FULL): 0.64 MMHG
BH CV ECHO MEAS - AO MEAN PG: 4.5 MMHG
BH CV ECHO MEAS - AO ROOT AREA (BSA CORRECTED): 1.9
BH CV ECHO MEAS - AO ROOT AREA: 6.8 CM^2
BH CV ECHO MEAS - AO ROOT DIAM: 2.9 CM
BH CV ECHO MEAS - AO V2 MAX: 146 CM/SEC
BH CV ECHO MEAS - AO V2 MEAN: 100.8 CM/SEC
BH CV ECHO MEAS - AO V2 VTI: 24.5 CM
BH CV ECHO MEAS - AVA(I,A): 2.8 CM^2
BH CV ECHO MEAS - AVA(I,D): 2.8 CM^2
BH CV ECHO MEAS - AVA(V,A): 2.2 CM^2
BH CV ECHO MEAS - AVA(V,D): 2.2 CM^2
BH CV ECHO MEAS - BSA(HAYCOCK): 1.6 M^2
BH CV ECHO MEAS - BSA: 1.5 M^2
BH CV ECHO MEAS - BZI_BMI: 22.3 KILOGRAMS/M^2
BH CV ECHO MEAS - BZI_METRIC_HEIGHT: 157 CM
BH CV ECHO MEAS - BZI_METRIC_WEIGHT: 55 KG
BH CV ECHO MEAS - EDV(CUBED): 35 ML
BH CV ECHO MEAS - EDV(TEICH): 43.2 ML
BH CV ECHO MEAS - EF(CUBED): 79.2 %
BH CV ECHO MEAS - EF(TEICH): 72.9 %
BH CV ECHO MEAS - ESV(CUBED): 7.3 ML
BH CV ECHO MEAS - ESV(TEICH): 11.7 ML
BH CV ECHO MEAS - FS: 40.8 %
BH CV ECHO MEAS - IVS/LVPW: 0.99
BH CV ECHO MEAS - IVSD: 1.2 CM
BH CV ECHO MEAS - LA DIMENSION: 3.4 CM
BH CV ECHO MEAS - LA/AO: 1.1
BH CV ECHO MEAS - LV MASS(C)D: 120.1 GRAMS
BH CV ECHO MEAS - LV MASS(C)DI: 77.9 GRAMS/M^2
BH CV ECHO MEAS - LV MAX PG: 6.2 MMHG
BH CV ECHO MEAS - LV MEAN PG: 3.9 MMHG
BH CV ECHO MEAS - LV V1 MAX: 124 CM/SEC
BH CV ECHO MEAS - LV V1 MEAN: 94.1 CM/SEC
BH CV ECHO MEAS - LV V1 VTI: 26.7 CM
BH CV ECHO MEAS - LVIDD: 3.3 CM
BH CV ECHO MEAS - LVIDS: 1.9 CM
BH CV ECHO MEAS - LVOT AREA: 2.6 CM^2
BH CV ECHO MEAS - LVOT DIAM: 1.8 CM
BH CV ECHO MEAS - LVPWD: 1.2 CM
BH CV ECHO MEAS - MV A MAX VEL: 86.3 CM/SEC
BH CV ECHO MEAS - MV E MAX VEL: 99.2 CM/SEC
BH CV ECHO MEAS - MV E/A: 1.1
BH CV ECHO MEAS - MV MAX PG: 5.1 MMHG
BH CV ECHO MEAS - MV MEAN PG: 1.9 MMHG
BH CV ECHO MEAS - MV P1/2T MAX VEL: 112 CM/SEC
BH CV ECHO MEAS - MV V2 MAX: 113 CM/SEC
BH CV ECHO MEAS - MV V2 MEAN: 63.6 CM/SEC
BH CV ECHO MEAS - MV V2 VTI: 41.5 CM
BH CV ECHO MEAS - MVA P1/2T LCG: 2 CM^2
BH CV ECHO MEAS - MVA(VTI): 1.6 CM^2
BH CV ECHO MEAS - PA MAX PG: 3.5 MMHG
BH CV ECHO MEAS - PA V2 MAX: 93 CM/SEC
BH CV ECHO MEAS - RAP SYSTOLE: 5 MMHG
BH CV ECHO MEAS - RVDD: 1.5 CM
BH CV ECHO MEAS - RVSP: 32.7 MMHG
BH CV ECHO MEAS - SI(AO): 107.4 ML/M^2
BH CV ECHO MEAS - SI(CUBED): 18 ML/M^2
BH CV ECHO MEAS - SI(LVOT): 44.2 ML/M^2
BH CV ECHO MEAS - SI(TEICH): 20.4 ML/M^2
BH CV ECHO MEAS - SV(AO): 165.6 ML
BH CV ECHO MEAS - SV(CUBED): 27.7 ML
BH CV ECHO MEAS - SV(LVOT): 68.1 ML
BH CV ECHO MEAS - SV(TEICH): 31.5 ML
BH CV ECHO MEAS - TR MAX VEL: 263 CM/SEC
BILIRUB UR QL STRIP: NEGATIVE
BUN BLD-MCNC: 8 MG/DL (ref 7–21)
BUN/CREAT SERPL: 16 (ref 7–25)
CALCIUM SPEC-SCNC: 7.4 MG/DL (ref 8.4–10.2)
CHLORIDE SERPL-SCNC: 106 MMOL/L (ref 95–110)
CLARITY UR: CLEAR
CO2 SERPL-SCNC: 21 MMOL/L (ref 22–31)
COLOR UR: YELLOW
CREAT BLD-MCNC: 0.5 MG/DL (ref 0.5–1)
DEPRECATED RDW RBC AUTO: 41.4 FL (ref 36.4–46.3)
EOSINOPHIL # BLD AUTO: 0.23 10*3/MM3 (ref 0–0.7)
EOSINOPHIL NFR BLD AUTO: 2.3 % (ref 0–7)
ERYTHROCYTE [DISTWIDTH] IN BLOOD BY AUTOMATED COUNT: 12.5 % (ref 11.5–14.5)
GFR SERPL CREATININE-BSD FRML MDRD: 118 ML/MIN/1.73 (ref 39–90)
GLUCOSE BLD-MCNC: 95 MG/DL (ref 60–100)
GLUCOSE UR STRIP-MCNC: NEGATIVE MG/DL
HCT VFR BLD AUTO: 30.7 % (ref 35–45)
HGB BLD-MCNC: 11 G/DL (ref 12–15.5)
HGB UR QL STRIP.AUTO: NEGATIVE
IMM GRANULOCYTES # BLD: 0.04 10*3/MM3 (ref 0–0.02)
IMM GRANULOCYTES NFR BLD: 0.4 % (ref 0–0.5)
KETONES UR QL STRIP: NEGATIVE
LEUKOCYTE ESTERASE UR QL STRIP.AUTO: NEGATIVE
LYMPHOCYTES # BLD AUTO: 1.16 10*3/MM3 (ref 0.6–4.2)
LYMPHOCYTES NFR BLD AUTO: 11.6 % (ref 10–50)
MAXIMAL PREDICTED HEART RATE: 138 BPM
MCH RBC QN AUTO: 32.4 PG (ref 26.5–34)
MCHC RBC AUTO-ENTMCNC: 35.8 G/DL (ref 31.4–36)
MCV RBC AUTO: 90.6 FL (ref 80–98)
MONOCYTES # BLD AUTO: 0.95 10*3/MM3 (ref 0–0.9)
MONOCYTES NFR BLD AUTO: 9.5 % (ref 0–12)
NEUTROPHILS # BLD AUTO: 7.56 10*3/MM3 (ref 2–8.6)
NEUTROPHILS NFR BLD AUTO: 76 % (ref 37–80)
NITRITE UR QL STRIP: NEGATIVE
PH UR STRIP.AUTO: 5.5 [PH] (ref 5–9)
PLATELET # BLD AUTO: 235 10*3/MM3 (ref 150–450)
PMV BLD AUTO: 9.7 FL (ref 8–12)
POTASSIUM BLD-SCNC: 3.6 MMOL/L (ref 3.5–5.1)
PROT UR QL STRIP: NEGATIVE
RBC # BLD AUTO: 3.39 10*6/MM3 (ref 3.77–5.16)
SODIUM BLD-SCNC: 131 MMOL/L (ref 137–145)
SP GR UR STRIP: 1.01 (ref 1–1.03)
STRESS TARGET HR: 117 BPM
TROPONIN I SERPL-MCNC: 0.05 NG/ML
UROBILINOGEN UR QL STRIP: NORMAL
WBC NRBC COR # BLD: 9.96 10*3/MM3 (ref 3.2–9.8)

## 2018-10-29 PROCEDURE — G0378 HOSPITAL OBSERVATION PER HR: HCPCS

## 2018-10-29 PROCEDURE — G8979 MOBILITY GOAL STATUS: HCPCS

## 2018-10-29 PROCEDURE — 81003 URINALYSIS AUTO W/O SCOPE: CPT | Performed by: STUDENT IN AN ORGANIZED HEALTH CARE EDUCATION/TRAINING PROGRAM

## 2018-10-29 PROCEDURE — 96366 THER/PROPH/DIAG IV INF ADDON: CPT

## 2018-10-29 PROCEDURE — G8978 MOBILITY CURRENT STATUS: HCPCS

## 2018-10-29 PROCEDURE — 96361 HYDRATE IV INFUSION ADD-ON: CPT

## 2018-10-29 PROCEDURE — 97530 THERAPEUTIC ACTIVITIES: CPT

## 2018-10-29 PROCEDURE — 85025 COMPLETE CBC W/AUTO DIFF WBC: CPT | Performed by: INTERNAL MEDICINE

## 2018-10-29 PROCEDURE — 97116 GAIT TRAINING THERAPY: CPT

## 2018-10-29 PROCEDURE — 97162 PT EVAL MOD COMPLEX 30 MIN: CPT

## 2018-10-29 PROCEDURE — 93306 TTE W/DOPPLER COMPLETE: CPT

## 2018-10-29 PROCEDURE — 80048 BASIC METABOLIC PNL TOTAL CA: CPT | Performed by: INTERNAL MEDICINE

## 2018-10-29 RX ORDER — SODIUM CHLORIDE 9 MG/ML
75 INJECTION, SOLUTION INTRAVENOUS CONTINUOUS
Status: DISCONTINUED | OUTPATIENT
Start: 2018-10-29 | End: 2018-11-01 | Stop reason: HOSPADM

## 2018-10-29 RX ORDER — HYDRALAZINE HYDROCHLORIDE 50 MG/1
50 TABLET, FILM COATED ORAL 3 TIMES DAILY
Status: DISCONTINUED | OUTPATIENT
Start: 2018-10-29 | End: 2018-11-01 | Stop reason: HOSPADM

## 2018-10-29 RX ADMIN — METOPROLOL TARTRATE 50 MG: 50 TABLET ORAL at 08:45

## 2018-10-29 RX ADMIN — Medication 3 ML: at 08:45

## 2018-10-29 RX ADMIN — Medication 3 ML: at 22:02

## 2018-10-29 RX ADMIN — AZTREONAM 2 G: 2 INJECTION, POWDER, LYOPHILIZED, FOR SOLUTION INTRAMUSCULAR; INTRAVENOUS at 15:05

## 2018-10-29 RX ADMIN — ISOSORBIDE MONONITRATE 30 MG: 30 TABLET, EXTENDED RELEASE ORAL at 08:46

## 2018-10-29 RX ADMIN — HYDRALAZINE HYDROCHLORIDE 25 MG: 25 TABLET, FILM COATED ORAL at 08:46

## 2018-10-29 RX ADMIN — AZTREONAM 2 G: 2 INJECTION, POWDER, LYOPHILIZED, FOR SOLUTION INTRAMUSCULAR; INTRAVENOUS at 21:05

## 2018-10-29 RX ADMIN — CETIRIZINE HYDROCHLORIDE 5 MG: 5 TABLET, FILM COATED ORAL at 08:45

## 2018-10-29 RX ADMIN — CLOPIDOGREL BISULFATE 75 MG: 75 TABLET ORAL at 08:46

## 2018-10-29 RX ADMIN — ASPIRIN 81 MG CHEWABLE TABLET 81 MG: 81 TABLET CHEWABLE at 08:46

## 2018-10-29 RX ADMIN — METOPROLOL TARTRATE 50 MG: 50 TABLET ORAL at 21:05

## 2018-10-29 RX ADMIN — Medication 3 ML: at 06:10

## 2018-10-29 RX ADMIN — HYDRALAZINE HYDROCHLORIDE 50 MG: 50 TABLET, FILM COATED ORAL at 21:05

## 2018-10-29 RX ADMIN — VALSARTAN 160 MG: 160 TABLET, FILM COATED ORAL at 08:45

## 2018-10-29 RX ADMIN — SODIUM CHLORIDE 75 ML/HR: 9 INJECTION, SOLUTION INTRAVENOUS at 13:04

## 2018-10-29 RX ADMIN — AZTREONAM 2 G: 2 INJECTION, POWDER, LYOPHILIZED, FOR SOLUTION INTRAMUSCULAR; INTRAVENOUS at 06:10

## 2018-10-29 RX ADMIN — VALSARTAN 160 MG: 160 TABLET, FILM COATED ORAL at 21:05

## 2018-10-29 RX ADMIN — CLONIDINE HYDROCHLORIDE 0.2 MG: 0.2 TABLET ORAL at 08:45

## 2018-10-29 RX ADMIN — CLONIDINE HYDROCHLORIDE 0.2 MG: 0.2 TABLET ORAL at 21:05

## 2018-10-29 RX ADMIN — HYDRALAZINE HYDROCHLORIDE 50 MG: 50 TABLET, FILM COATED ORAL at 18:31

## 2018-10-30 LAB
ANION GAP SERPL CALCULATED.3IONS-SCNC: 7 MMOL/L (ref 5–15)
BASOPHILS # BLD AUTO: 0.02 10*3/MM3 (ref 0–0.2)
BASOPHILS NFR BLD AUTO: 0.2 % (ref 0–2)
BUN BLD-MCNC: 11 MG/DL (ref 7–21)
BUN/CREAT SERPL: 22.4 (ref 7–25)
CALCIUM SPEC-SCNC: 8.3 MG/DL (ref 8.4–10.2)
CHLORIDE SERPL-SCNC: 101 MMOL/L (ref 95–110)
CO2 SERPL-SCNC: 26 MMOL/L (ref 22–31)
CREAT BLD-MCNC: 0.49 MG/DL (ref 0.5–1)
DEPRECATED RDW RBC AUTO: 40.4 FL (ref 36.4–46.3)
EOSINOPHIL # BLD AUTO: 0.3 10*3/MM3 (ref 0–0.7)
EOSINOPHIL NFR BLD AUTO: 2.9 % (ref 0–7)
ERYTHROCYTE [DISTWIDTH] IN BLOOD BY AUTOMATED COUNT: 12.2 % (ref 11.5–14.5)
GFR SERPL CREATININE-BSD FRML MDRD: 121 ML/MIN/1.73 (ref 39–90)
GLUCOSE BLD-MCNC: 102 MG/DL (ref 60–100)
HCT VFR BLD AUTO: 35.5 % (ref 35–45)
HEMOCCULT STL QL: NEGATIVE
HGB BLD-MCNC: 12.5 G/DL (ref 12–15.5)
IMM GRANULOCYTES # BLD: 0.04 10*3/MM3 (ref 0–0.02)
IMM GRANULOCYTES NFR BLD: 0.4 % (ref 0–0.5)
LYMPHOCYTES # BLD AUTO: 1.43 10*3/MM3 (ref 0.6–4.2)
LYMPHOCYTES NFR BLD AUTO: 14 % (ref 10–50)
MCH RBC QN AUTO: 31.8 PG (ref 26.5–34)
MCHC RBC AUTO-ENTMCNC: 35.2 G/DL (ref 31.4–36)
MCV RBC AUTO: 90.3 FL (ref 80–98)
MONOCYTES # BLD AUTO: 0.87 10*3/MM3 (ref 0–0.9)
MONOCYTES NFR BLD AUTO: 8.5 % (ref 0–12)
NEUTROPHILS # BLD AUTO: 7.58 10*3/MM3 (ref 2–8.6)
NEUTROPHILS NFR BLD AUTO: 74 % (ref 37–80)
PLATELET # BLD AUTO: 284 10*3/MM3 (ref 150–450)
PMV BLD AUTO: 10 FL (ref 8–12)
POTASSIUM BLD-SCNC: 3.4 MMOL/L (ref 3.5–5.1)
POTASSIUM BLD-SCNC: 4 MMOL/L (ref 3.5–5.1)
RBC # BLD AUTO: 3.93 10*6/MM3 (ref 3.77–5.16)
SODIUM BLD-SCNC: 134 MMOL/L (ref 137–145)
WBC NRBC COR # BLD: 10.24 10*3/MM3 (ref 3.2–9.8)

## 2018-10-30 PROCEDURE — G0378 HOSPITAL OBSERVATION PER HR: HCPCS

## 2018-10-30 PROCEDURE — 85025 COMPLETE CBC W/AUTO DIFF WBC: CPT | Performed by: INTERNAL MEDICINE

## 2018-10-30 PROCEDURE — 96366 THER/PROPH/DIAG IV INF ADDON: CPT

## 2018-10-30 PROCEDURE — G8988 SELF CARE GOAL STATUS: HCPCS

## 2018-10-30 PROCEDURE — 97166 OT EVAL MOD COMPLEX 45 MIN: CPT

## 2018-10-30 PROCEDURE — 96361 HYDRATE IV INFUSION ADD-ON: CPT

## 2018-10-30 PROCEDURE — 82272 OCCULT BLD FECES 1-3 TESTS: CPT | Performed by: FAMILY MEDICINE

## 2018-10-30 PROCEDURE — 84132 ASSAY OF SERUM POTASSIUM: CPT | Performed by: FAMILY MEDICINE

## 2018-10-30 PROCEDURE — G8987 SELF CARE CURRENT STATUS: HCPCS

## 2018-10-30 PROCEDURE — 80048 BASIC METABOLIC PNL TOTAL CA: CPT | Performed by: INTERNAL MEDICINE

## 2018-10-30 PROCEDURE — 97530 THERAPEUTIC ACTIVITIES: CPT

## 2018-10-30 RX ADMIN — CLONIDINE HYDROCHLORIDE 0.2 MG: 0.2 TABLET ORAL at 08:16

## 2018-10-30 RX ADMIN — VALSARTAN 160 MG: 160 TABLET, FILM COATED ORAL at 08:16

## 2018-10-30 RX ADMIN — AZTREONAM 2 G: 2 INJECTION, POWDER, LYOPHILIZED, FOR SOLUTION INTRAMUSCULAR; INTRAVENOUS at 13:50

## 2018-10-30 RX ADMIN — VALSARTAN 160 MG: 160 TABLET, FILM COATED ORAL at 21:02

## 2018-10-30 RX ADMIN — AZTREONAM 2 G: 2 INJECTION, POWDER, LYOPHILIZED, FOR SOLUTION INTRAMUSCULAR; INTRAVENOUS at 21:06

## 2018-10-30 RX ADMIN — ISOSORBIDE MONONITRATE 30 MG: 30 TABLET, EXTENDED RELEASE ORAL at 08:16

## 2018-10-30 RX ADMIN — HYDRALAZINE HYDROCHLORIDE 50 MG: 50 TABLET, FILM COATED ORAL at 21:02

## 2018-10-30 RX ADMIN — POTASSIUM CHLORIDE 40 MEQ: 750 CAPSULE, EXTENDED RELEASE ORAL at 15:13

## 2018-10-30 RX ADMIN — CLONIDINE HYDROCHLORIDE 0.2 MG: 0.2 TABLET ORAL at 21:02

## 2018-10-30 RX ADMIN — HYDRALAZINE HYDROCHLORIDE 50 MG: 50 TABLET, FILM COATED ORAL at 15:13

## 2018-10-30 RX ADMIN — CETIRIZINE HYDROCHLORIDE 5 MG: 5 TABLET, FILM COATED ORAL at 08:16

## 2018-10-30 RX ADMIN — HYDRALAZINE HYDROCHLORIDE 50 MG: 50 TABLET, FILM COATED ORAL at 08:16

## 2018-10-30 RX ADMIN — METOPROLOL TARTRATE 50 MG: 50 TABLET ORAL at 08:16

## 2018-10-30 RX ADMIN — ASPIRIN 81 MG CHEWABLE TABLET 81 MG: 81 TABLET CHEWABLE at 08:15

## 2018-10-30 RX ADMIN — Medication 3 ML: at 21:02

## 2018-10-30 RX ADMIN — CLOPIDOGREL BISULFATE 75 MG: 75 TABLET ORAL at 08:15

## 2018-10-30 RX ADMIN — AZTREONAM 2 G: 2 INJECTION, POWDER, LYOPHILIZED, FOR SOLUTION INTRAMUSCULAR; INTRAVENOUS at 05:42

## 2018-10-30 RX ADMIN — POTASSIUM CHLORIDE 40 MEQ: 750 CAPSULE, EXTENDED RELEASE ORAL at 08:16

## 2018-10-30 RX ADMIN — METOPROLOL TARTRATE 50 MG: 50 TABLET ORAL at 21:02

## 2018-10-30 RX ADMIN — SODIUM CHLORIDE TAB 1 GM 1 G: 1 TAB at 08:16

## 2018-10-31 LAB
ANION GAP SERPL CALCULATED.3IONS-SCNC: 7 MMOL/L (ref 5–15)
BASOPHILS # BLD AUTO: 0.02 10*3/MM3 (ref 0–0.2)
BASOPHILS NFR BLD AUTO: 0.2 % (ref 0–2)
BUN BLD-MCNC: 14 MG/DL (ref 7–21)
BUN/CREAT SERPL: 25.9 (ref 7–25)
CALCIUM SPEC-SCNC: 8.2 MG/DL (ref 8.4–10.2)
CHLORIDE SERPL-SCNC: 103 MMOL/L (ref 95–110)
CO2 SERPL-SCNC: 23 MMOL/L (ref 22–31)
CREAT BLD-MCNC: 0.54 MG/DL (ref 0.5–1)
DEPRECATED RDW RBC AUTO: 40.1 FL (ref 36.4–46.3)
EOSINOPHIL # BLD AUTO: 0.52 10*3/MM3 (ref 0–0.7)
EOSINOPHIL NFR BLD AUTO: 6 % (ref 0–7)
ERYTHROCYTE [DISTWIDTH] IN BLOOD BY AUTOMATED COUNT: 12.2 % (ref 11.5–14.5)
GFR SERPL CREATININE-BSD FRML MDRD: 108 ML/MIN/1.73 (ref 39–90)
GLUCOSE BLD-MCNC: 108 MG/DL (ref 60–100)
HCT VFR BLD AUTO: 33.3 % (ref 35–45)
HGB BLD-MCNC: 11.9 G/DL (ref 12–15.5)
IMM GRANULOCYTES # BLD: 0.08 10*3/MM3 (ref 0–0.02)
IMM GRANULOCYTES NFR BLD: 0.9 % (ref 0–0.5)
LYMPHOCYTES # BLD AUTO: 1.51 10*3/MM3 (ref 0.6–4.2)
LYMPHOCYTES NFR BLD AUTO: 17.4 % (ref 10–50)
MCH RBC QN AUTO: 32.1 PG (ref 26.5–34)
MCHC RBC AUTO-ENTMCNC: 35.7 G/DL (ref 31.4–36)
MCV RBC AUTO: 89.8 FL (ref 80–98)
MONOCYTES # BLD AUTO: 1.03 10*3/MM3 (ref 0–0.9)
MONOCYTES NFR BLD AUTO: 11.8 % (ref 0–12)
NEUTROPHILS # BLD AUTO: 5.54 10*3/MM3 (ref 2–8.6)
NEUTROPHILS NFR BLD AUTO: 63.7 % (ref 37–80)
PLATELET # BLD AUTO: 248 10*3/MM3 (ref 150–450)
PMV BLD AUTO: 9.7 FL (ref 8–12)
POTASSIUM BLD-SCNC: 4 MMOL/L (ref 3.5–5.1)
RBC # BLD AUTO: 3.71 10*6/MM3 (ref 3.77–5.16)
SODIUM BLD-SCNC: 133 MMOL/L (ref 137–145)
WBC NRBC COR # BLD: 8.7 10*3/MM3 (ref 3.2–9.8)

## 2018-10-31 PROCEDURE — 97116 GAIT TRAINING THERAPY: CPT

## 2018-10-31 PROCEDURE — 96366 THER/PROPH/DIAG IV INF ADDON: CPT

## 2018-10-31 PROCEDURE — 97530 THERAPEUTIC ACTIVITIES: CPT

## 2018-10-31 PROCEDURE — 80048 BASIC METABOLIC PNL TOTAL CA: CPT | Performed by: INTERNAL MEDICINE

## 2018-10-31 PROCEDURE — 85025 COMPLETE CBC W/AUTO DIFF WBC: CPT | Performed by: INTERNAL MEDICINE

## 2018-10-31 PROCEDURE — G0378 HOSPITAL OBSERVATION PER HR: HCPCS

## 2018-10-31 PROCEDURE — 96376 TX/PRO/DX INJ SAME DRUG ADON: CPT

## 2018-10-31 PROCEDURE — 96375 TX/PRO/DX INJ NEW DRUG ADDON: CPT

## 2018-10-31 PROCEDURE — 97110 THERAPEUTIC EXERCISES: CPT

## 2018-10-31 RX ORDER — LABETALOL HYDROCHLORIDE 5 MG/ML
10 INJECTION, SOLUTION INTRAVENOUS EVERY 6 HOURS PRN
Status: DISCONTINUED | OUTPATIENT
Start: 2018-10-31 | End: 2018-11-01 | Stop reason: HOSPADM

## 2018-10-31 RX ADMIN — ISOSORBIDE MONONITRATE 30 MG: 30 TABLET, EXTENDED RELEASE ORAL at 08:52

## 2018-10-31 RX ADMIN — VALSARTAN 160 MG: 160 TABLET, FILM COATED ORAL at 20:10

## 2018-10-31 RX ADMIN — ASPIRIN 81 MG CHEWABLE TABLET 81 MG: 81 TABLET CHEWABLE at 08:52

## 2018-10-31 RX ADMIN — CLONIDINE HYDROCHLORIDE 0.2 MG: 0.2 TABLET ORAL at 08:52

## 2018-10-31 RX ADMIN — HYDRALAZINE HYDROCHLORIDE 50 MG: 50 TABLET, FILM COATED ORAL at 20:11

## 2018-10-31 RX ADMIN — SODIUM CHLORIDE 75 ML/HR: 9 INJECTION, SOLUTION INTRAVENOUS at 14:49

## 2018-10-31 RX ADMIN — CLONIDINE HYDROCHLORIDE 0.2 MG: 0.2 TABLET ORAL at 20:10

## 2018-10-31 RX ADMIN — METOPROLOL TARTRATE 50 MG: 50 TABLET ORAL at 20:11

## 2018-10-31 RX ADMIN — Medication 3 ML: at 22:40

## 2018-10-31 RX ADMIN — LABETALOL HYDROCHLORIDE 10 MG: 5 INJECTION, SOLUTION INTRAVENOUS at 03:19

## 2018-10-31 RX ADMIN — HYDRALAZINE HYDROCHLORIDE 50 MG: 50 TABLET, FILM COATED ORAL at 15:33

## 2018-10-31 RX ADMIN — AZTREONAM 2 G: 2 INJECTION, POWDER, LYOPHILIZED, FOR SOLUTION INTRAMUSCULAR; INTRAVENOUS at 21:25

## 2018-10-31 RX ADMIN — VALSARTAN 160 MG: 160 TABLET, FILM COATED ORAL at 08:52

## 2018-10-31 RX ADMIN — SODIUM CHLORIDE 75 ML/HR: 9 INJECTION, SOLUTION INTRAVENOUS at 05:26

## 2018-10-31 RX ADMIN — AZTREONAM 2 G: 2 INJECTION, POWDER, LYOPHILIZED, FOR SOLUTION INTRAMUSCULAR; INTRAVENOUS at 14:49

## 2018-10-31 RX ADMIN — METOPROLOL TARTRATE 50 MG: 50 TABLET ORAL at 08:52

## 2018-10-31 RX ADMIN — AZTREONAM 2 G: 2 INJECTION, POWDER, LYOPHILIZED, FOR SOLUTION INTRAMUSCULAR; INTRAVENOUS at 05:21

## 2018-10-31 RX ADMIN — CLOPIDOGREL BISULFATE 75 MG: 75 TABLET ORAL at 08:52

## 2018-10-31 RX ADMIN — HYDRALAZINE HYDROCHLORIDE 50 MG: 50 TABLET, FILM COATED ORAL at 08:52

## 2018-10-31 RX ADMIN — LABETALOL HYDROCHLORIDE 10 MG: 5 INJECTION, SOLUTION INTRAVENOUS at 22:40

## 2018-10-31 RX ADMIN — CETIRIZINE HYDROCHLORIDE 5 MG: 5 TABLET, FILM COATED ORAL at 08:52

## 2018-11-01 VITALS
HEIGHT: 62 IN | BODY MASS INDEX: 22.45 KG/M2 | TEMPERATURE: 98.3 F | OXYGEN SATURATION: 95 % | DIASTOLIC BLOOD PRESSURE: 78 MMHG | SYSTOLIC BLOOD PRESSURE: 160 MMHG | RESPIRATION RATE: 18 BRPM | HEART RATE: 62 BPM | WEIGHT: 122 LBS

## 2018-11-01 LAB
BACTERIA SPEC AEROBE CULT: NORMAL
BACTERIA SPEC AEROBE CULT: NORMAL

## 2018-11-01 PROCEDURE — 97535 SELF CARE MNGMENT TRAINING: CPT

## 2018-11-01 PROCEDURE — 96361 HYDRATE IV INFUSION ADD-ON: CPT

## 2018-11-01 PROCEDURE — G0378 HOSPITAL OBSERVATION PER HR: HCPCS

## 2018-11-01 PROCEDURE — 96366 THER/PROPH/DIAG IV INF ADDON: CPT

## 2018-11-01 PROCEDURE — 97530 THERAPEUTIC ACTIVITIES: CPT

## 2018-11-01 PROCEDURE — 97110 THERAPEUTIC EXERCISES: CPT

## 2018-11-01 RX ORDER — HYDRALAZINE HYDROCHLORIDE 10 MG/1
10 TABLET, FILM COATED ORAL ONCE
Status: COMPLETED | OUTPATIENT
Start: 2018-11-01 | End: 2018-11-01

## 2018-11-01 RX ADMIN — ASPIRIN 81 MG CHEWABLE TABLET 81 MG: 81 TABLET CHEWABLE at 08:39

## 2018-11-01 RX ADMIN — CLOPIDOGREL BISULFATE 75 MG: 75 TABLET ORAL at 08:39

## 2018-11-01 RX ADMIN — SODIUM CHLORIDE TAB 1 GM 1 G: 1 TAB at 08:39

## 2018-11-01 RX ADMIN — METOPROLOL TARTRATE 50 MG: 50 TABLET ORAL at 08:39

## 2018-11-01 RX ADMIN — CLONIDINE HYDROCHLORIDE 0.2 MG: 0.2 TABLET ORAL at 08:38

## 2018-11-01 RX ADMIN — ISOSORBIDE MONONITRATE 30 MG: 30 TABLET, EXTENDED RELEASE ORAL at 08:38

## 2018-11-01 RX ADMIN — VALSARTAN 160 MG: 160 TABLET, FILM COATED ORAL at 08:39

## 2018-11-01 RX ADMIN — HYDRALAZINE HYDROCHLORIDE 10 MG: 10 TABLET, FILM COATED ORAL at 04:35

## 2018-11-01 RX ADMIN — AZTREONAM 2 G: 2 INJECTION, POWDER, LYOPHILIZED, FOR SOLUTION INTRAMUSCULAR; INTRAVENOUS at 05:21

## 2018-11-01 RX ADMIN — CETIRIZINE HYDROCHLORIDE 5 MG: 5 TABLET, FILM COATED ORAL at 08:38

## 2018-11-01 RX ADMIN — HYDRALAZINE HYDROCHLORIDE 50 MG: 50 TABLET, FILM COATED ORAL at 08:38

## 2018-11-01 NOTE — THERAPY DISCHARGE NOTE
Acute Care - Occupational Therapy Discharge Summary  [facility]     Patient Name: Minerva Madrid  : 1935  MRN: [id number]    Today's Date: 2018  Onset of Illness/Injury or Date of Surgery: 10/27/18    Date of Referral to OT: 10/29/18  Referring Physician: Dr. Simon      Admit Date: 10/27/2018        OT Recommendation and Plan    Visit Dx:    ICD-10-CM ICD-9-CM   1. Lower abdominal pain R10.30 789.09   2. Sepsis, due to unspecified organism (CMS/[facility]) A41.9 038.9     995.91   3. Impaired functional mobility, balance, gait, and endurance Z74.09 V49.89   4. Essential hypertension, benign I10 401.1   5. Elevated troponin R74.8 790.6   6. Atherosclerosis of native coronary artery of native heart, angina presence unspecified I25.10 414.01   7. 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 Impaired mobility and ADLs Z74.09 799.89               Time Calculation- OT     Row Name 18 1358 18 1340          Time Calculation- OT    OT Start Time 0930  -KD 0805  -KD     OT Stop Time 1039  -KD 0833  -KD     OT Time Calculation (min) 69 min  -KD 28 min  -KD     Total Timed Code Minutes- OT 69 minute(s)  -KD 28 minute(s)  -KD     OT Received On 18  -KD 18  -KD       User Key  (r) = Recorded By, (t) = Taken By, (c) = Cosigned By    Initials Name Provider Type    Christine Vergara COTA/L Occupational Therapy Assistant                  OT Rehab Goals     Row Name 18 0930 18 0805          Transfer Goal 1 (OT)    Activity/Assistive Device (Transfer Goal 1, OT) toilet  -KD toilet  -KD     [place] Level/Cues Needed (Transfer Goal 1, OT) minimum assist (75% or more patient effort)  -KD minimum assist (75% or more patient effort)  -KD     Time Frame (Transfer Goal 1, OT) long term goal (LTG);by discharge  -KD long term goal (LTG);by discharge  -KD     Progress/Outcome (Transfer Goal 1, OT) goal not met  -KD  goal not met  -KD        Bathing Goal 1 (OT)    Activity/Assistive Device (Bathing Goal 1, OT) bathing skills, all  -KD bathing skills, all  -KD     [place] Level/Cues Needed (Bathing Goal 1, OT) minimum assist (75% or more patient effort)  -KD minimum assist (75% or more patient effort)  -KD     Time Frame (Bathing Goal 1, OT) long term goal (LTG);by discharge  -KD long term goal (LTG);by discharge  -KD     Progress/Outcomes (Bathing Goal 1, OT) goal not met  -KD goal not met  -KD        Dressing Goal 1 (OT)    Activity/Assistive Device (Dressing Goal 1, OT) dressing skills, all  -KD dressing skills, all  -KD     [place]/Cues Needed (Dressing Goal 1, OT) minimum assist (75% or more patient effort)  -KD minimum assist (75% or more patient effort)  -KD     Time Frame (Dressing Goal 1, OT) long term goal (LTG);by discharge  -KD long term goal (LTG);by discharge  -KD     Progress/Outcome (Dressing Goal 1, OT) goal not met  -KD goal not met  -KD        Toileting Goal 1 (OT)    Activity/Device (Toileting Goal 1, OT) toileting skills, all  -KD toileting skills, all  -KD     [place] Level/Cues Needed (Toileting Goal 1, OT) contact guard assist;set-up required;verbal cues required  -KD contact guard assist;set-up required;verbal cues required  -KD     Time Frame (Toileting Goal 1, OT) long term goal (LTG);by discharge  -KD long term goal (LTG);by discharge  -KD     Progress/Outcome (Toileting Goal 1, OT) goal not met  -KD goal not met  -KD        Patient Education Goal (OT)    Activity (Patient Education Goal, OT) Pt will communicate good home safety awareness recalling at least 5 items.   -KD Pt will communicate good home safety awareness recalling at least 5 items.   -KD     [place]/Cues/Accuracy (Memory Goal 2, OT) verbalizes understanding  -KD verbalizes understanding  -KD     Time Frame (Patient Education Goal, OT) long term goal (LTG);by discharge  -KD long term goal (LTG);by discharge  -KD      Progress/Outcome (Patient Education Goal, OT) goal not met  -KD goal not met  -KD       User Key  (r) = Recorded By, (t) = Taken By, (c) = Cosigned By    Initials Name Provider Type Discipline    Christine Vregara COTA/L Occupational Therapy Assistant OT                Outcome Measures     Row Name 11/01/18 0930 11/01/18 0805 10/31/18 1325       How much help from another person do you currently need...    Turning from your back to your side while in flat bed without using bedrails?  --  -- 4  -TW    Moving from lying on back to sitting on the side of a flat bed without bedrails?  --  -- 3  -TW    Moving to and from a bed to a chair (including a wheelchair)?  --  -- 3  -TW    Standing up from a chair using your arms (e.g., wheelchair, bedside chair)?  --  -- 3  -TW    Climbing 3-5 steps with a railing?  --  -- 3  -TW    To walk in hospital room?  --  -- 3  -TW    AM-PAC 6 Clicks Score  --  -- 19  -TW       How much help from another is currently needed...    Putting on and taking off regular lower body clothing? 2  -KD 2  -KD  --    Bathing (including washing, rinsing, and drying) 2  -KD 2  -KD  --    Toileting (which includes using toilet bed pan or urinal) 2  -KD 2  -KD  --    Putting on and taking off regular upper body clothing 3  -KD 3  -KD  --    Taking care of personal grooming (such as brushing teeth) 3  -KD 3  -KD  --    Eating meals 3  -KD 3  -KD  --    Score 15  -KD 15  -KD  --       Functional Assessment    Outcome Measure Options  --  -- AM-PAC 6 Clicks Basic Mobility (PT)  -TW    Row Name 10/31/18 0920 10/30/18 1047 10/30/18 0845       How much help from another person do you currently need...    Turning from your back to your side while in flat bed without using bedrails?  --  -- 4  -JA    Moving from lying on back to sitting on the side of a flat bed without bedrails?  --  -- 3  -JA    Moving to and from a bed to a chair (including a wheelchair)?  --  -- 3  -JA    Standing up from a chair using  your arms (e.g., wheelchair, bedside chair)?  --  -- 3  -JA    Climbing 3-5 steps with a railing?  --  -- 3  -JA    To walk in hospital room?  --  -- 3  -JA    AM-PAC 6 Clicks Score  --  -- 19  -JA       How much help from another is currently needed...    Putting on and taking off regular lower body clothing? 2  -KD 2  -BH  --    Bathing (including washing, rinsing, and drying) 2  -KD 2  -BH  --    Toileting (which includes using toilet bed pan or urinal) 2  -KD 2  -BH  --    Putting on and taking off regular upper body clothing 3  -KD 3  -BH  --    Taking care of personal grooming (such as brushing teeth) 3  -KD 3  -BH  --    Eating meals 3  -KD 3  -BH  --    Score 15  -KD 15  -BH  --       Functional Assessment    Outcome Measure Options  -- AM-PAC 6 Clicks Daily Activity (OT)  -[facility] 6 Clicks Basic Mobility (PT)  -    Row Name 10/29/18 4849             How much help from another person do you currently need...    Turning from your back to your side while in flat bed without using bedrails? 4  -MARLON      Moving from lying on back to sitting on the side of a flat bed without bedrails? 3  -MARLON      Moving to and from a bed to a chair (including a wheelchair)? 3  -MARLON      Standing up from a chair using your arms (e.g., wheelchair, bedside chair)? 3  -MARLON      Climbing 3-5 steps with a railing? 3  -MARLON      To walk in hospital room? 3  -MARLON      AM-PAC 6 Clicks Score 19  -         Functional Assessment    Outcome Measure Options AM-[facility] 6 Clicks Basic Mobility (PT)  -        User Key  (r) = Recorded By, (t) = Taken By, (c) = Cosigned By    Initials Name Provider Type     Anastacia Alexander, OTR/L Occupational Therapist    Sylvie Vegas, PT Physical Therapist    Gurmeet Dinh, PTA Physical Therapy Assistant    TW Jaron Penn, PTA Physical Therapy Assistant    KD Christine Fernandez, MEJIA/L Occupational Therapy Assistant          Therapy Suggested Charges     Code   Minutes Charges    None                  OT Discharge Summary  Anticipated Discharge Disposition (OT): home with home health  Reason for Discharge: Discharge from facility  Outcomes Achieved: Refer to plan of care for updates on goals achieved (no goals met)  Discharge Destination: Home with home health      Anastacia Alexander OTR/EDILBERTO  11/1/2018

## 2018-11-01 NOTE — THERAPY TREATMENT NOTE
Acute Care - Occupational Therapy Treatment Note  [facility]     Patient Name: Minerva Madrid  : 1935  MRN: [id number]  Today's Date: 2018  Onset of Illness/Injury or Date of Surgery: 10/27/18  Date of Referral to OT: 10/29/18  Referring Physician: Dr. Simon    Admit Date: 10/27/2018       ICD-10-CM ICD-9-CM   1. Lower abdominal pain R10.30 789.09   2. Sepsis, due to unspecified organism (CMS/HCC) A41.9 038.9     995.91   3. Impaired functional mobility, balance, gait, and endurance Z74.09 V49.89   4. Essential hypertension, benign I10 401.1   5. Elevated troponin R74.8 790.6   6. Atherosclerosis of native coronary artery of native heart, angina presence unspecified I25.10 414.01   7. 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 Impaired mobility and ADLs Z74.09 799.89     Patient Active Problem List   Diagnosis   • History of hemiarthroplasty of left hip   • CAD (coronary artery disease)   • Osteoporosis   • HTN (hypertension)   • Tobacco use   • Carotid stenosis, asymptomatic   • Chronic low back pain   • Hyponatremia   • PVD (peripheral vascular disease) (CMS/HCC)   • Lower abdominal pain   • Sepsis (CMS/HCC)   • Lactic acid acidosis   • Hypertensive urgency     Past Medical History:   Diagnosis Date   • Appetite loss    • Arthritis    • Coronary artery disease    • Coughing    • Diabetes mellitus (CMS/HCC)    • Elevated cholesterol    • Essential hypertension    • Leg pain    • Muscle weakness    • Postmenopausal bleeding     With thickened endometrial stripe    • Stroke (CMS/HCC) 2016     Past Surgical History:   Procedure Laterality Date   • CARDIAC SURGERY     • CAROTID ENDARTERECTOMY     • JOINT REPLACEMENT Left 2017    partial hip replacement   • MASTOID SURGERY         Therapy Treatment          Rehabilitation Treatment Summary     Row Name 18 0930 18 0805          Treatment Time/Intention     Discipline occupational therapy assistant  -KD occupational therapy assistant  -KD     Document Type therapy note (daily note)  -KD therapy note (daily note)  -KD     Subjective Information no complaints  -KD no complaints  -KD     Mode of Treatment occupational therapy  -KD occupational therapy  -KD     Patient/Family Observations no family present  -KD no family present  -KD     Therapy Frequency (OT Eval) other (see comments)   5-7days/wk  -KD other (see comments)   5-7days/wk  -KD     Patient Effort good  -KD good  -KD     Existing Precautions/Restrictions  -- fall  -KD     Treatment Considerations/Comments pt reported she had ADL already this AM  -KD2  --     Equipment Issued to Patient gait belt  -KD gait belt  -KD     Recorded by [KD] Christine Fernandez MEJIA/L 11/01/18 1348  [KD2] Christine Fernandez MEJIA/L 11/01/18 1357 [KD] Christine Fernandez MEJIA/L 11/01/18 1335     Row Name 11/01/18 0930 11/01/18 0805          Vital Signs    Pre Systolic BP Rehab 168  -  -KD     Pre Treatment Diastolic BP 78  -KD 78  -KD     Pretreatment Heart Rate (beats/min) 70  -KD 72  -KD     Posttreatment Heart Rate (beats/min) 74  -KD 76  -KD     Pre SpO2 (%) 96  -KD 96  -KD     O2 Delivery Pre Treatment room air  -KD room air  -KD     Post SpO2 (%) 96  -KD 96  -KD     O2 Delivery Post Treatment room air  -KD room air  -KD     Pre Patient Position Sitting  -KD Supine  -KD     Intra Patient Position Standing  -KD Standing  -KD     Post Patient Position Supine  -KD Sitting  -KD     Recorded by [KD] Christine Fernandez MEJIA/L 11/01/18 1348 [KD] Christine Fernandez MEJIA/L 11/01/18 1339     Row Name 11/01/18 0930 11/01/18 0805          Cognitive Assessment/Intervention- PT/OT    Affect/Mental Status (Cognitive) WFL  -KD WFL  -KD     Orientation Status (Cognition) oriented x 4  -KD oriented x 4  -KD     Follows Commands (Cognition) WFL  -KD WFL  -KD     Personal Safety Interventions  -- gait belt;fall prevention program maintained  -KD      Recorded by [KD] Christine Fernandez COTA/EDILBERTO 11/01/18 1348 [KD] Christine Fernandez MEJIA/L 11/01/18 1339     Row Name 11/01/18 0930 11/01/18 0805          Bed Mobility Assessment/Treatment    Bed Mobility Assessment/Treatment bed mobility (all) activities;sit-supine  -KD supine-sit  -KD     [place] Level (Bed Mobility) minimum assist (75% patient effort)  -KD  --     Supine-Sit [place] (Bed Mobility)  -- minimum assist (75% patient effort)  -KD     Sit-Supine [place] (Bed Mobility) minimum assist (75% patient effort)  -KD  --     Bed Mobility, Safety Issues decreased use of arms for pushing/pulling  -KD decreased use of arms for pushing/pulling  -KD     Assistive Device (Bed Mobility) head of bed elevated  -KD head of bed elevated  -KD     Recorded by [KD] Christine Fernandze COTA/EDILBERTO 11/01/18 1348 [KD] Christine Fernandez MEJIA/L 11/01/18 1339     Row Name 11/01/18 0930 11/01/18 0805          Functional Mobility    Functional Mobility- Ind. Level contact guard assist  -KD contact guard assist  -KD     Functional Mobility- Device rolling walker  -KD rolling walker  -KD     Functional Mobility-Distance (Feet) 10  -KD 10  -KD     Recorded by [KD] Christine Fernandez COTA/EDILBERTO 11/01/18 1348 [KD] Christine Fernandez MEJIA/L 11/01/18 1339     Row Name 11/01/18 0930 11/01/18 0805          Transfer Assessment/Treatment    Transfer Assessment/Treatment sit-stand transfer;stand-sit transfer;toilet transfer  -KD sit-stand transfer;stand-sit transfer;toilet transfer;bed-chair transfer  -KD     Comment (Transfers) pt completed 5 sit-stands w/ RB's PRN  -KD  --     Recorded by [KD] Christine Fernandez COTA/EDILBERTO 11/01/18 1348 [KD] Christine Fernandez MEJIA/L 11/01/18 1339     Row Name 11/01/18 0805             Bed-Chair Transfer    Bed-Chair [place] (Transfers) contact guard  -KD      Assistive Device (Bed-Chair Transfers) walker, front-wheeled  -KD      Recorded by [KD] Christine Fernandez, MEJIA/L 11/01/18 1339      Row Name 11/01/18 0930 11/01/18  0805          Sit-Stand Transfer    Sit-Stand [place] (Transfers) contact guard  -KD contact guard  -KD     Assistive Device (Sit-Stand Transfers) walker, front-wheeled  -KD walker, front-wheeled  -KD     Recorded by [KD] Christine Fernandez COTA/EDILBERTO 11/01/18 1348 [KD] Christine Fernandez MEJIA/L 11/01/18 1339     Row Name 11/01/18 0930 11/01/18 0805          Stand-Sit Transfer    Stand-Sit [place] (Transfers) contact guard  -KD contact guard  -KD     Assistive Device (Stand-Sit Transfers) walker, front-wheeled  -KD walker, front-wheeled  -KD     Recorded by [KD] Christine Fernandez COTA/EDILBERTO 11/01/18 1348 [KD] Christine Fernandez MEJIA/L 11/01/18 1339     Row Name 11/01/18 0930 11/01/18 0805          Toilet Transfer    Type (Toilet Transfer) sit-stand;stand-sit  -KD sit-stand;stand-sit  -KD     [place] Level (Toilet Transfer) minimum assist (75% patient effort)  -KD minimum assist (75% patient effort)  -KD     Assistive Device (Toilet Transfer) commode;grab bars/safety frame  -KD commode;walker, front-wheeled;grab bars/safety frame  -KD     Recorded by [KD] Christine Fernandez MEJIA/L 11/01/18 1348 [KD] Christine Fernandez MEJIA/L 11/01/18 1339     Row Name 11/01/18 0930 11/01/18 0805          Grooming Assessment/Training    [place] Level (Grooming) grooming skills;hair care, combing/brushing;wash face, hands;supervision  -KD grooming skills;wash face, hands;supervision  -KD     Grooming Position supported sitting  -KD supported sitting  -KD     Recorded by [KD] Christine Fernandez COTA/EDILBERTO 11/01/18 1348 [KD] Christine Fernandez MEJIA/L 11/01/18 1339     Row Name 11/01/18 0930 11/01/18 0805          Toileting Assessment/Training    [place] Level (Toileting) toileting skills;adjust/manage clothing;perform perineal hygiene;contact guard assist  -KD toileting skills;adjust/manage clothing;perform perineal hygiene;contact guard assist  -KD     Assistive Devices (Toileting) commode;deon bar/safety frame  -KD commode;deon  bar/safety frame  -KD     Toileting Position supported sitting  -KD supported sitting  -KD     Recorded by [KD] Christine Fernandez COTA/EDILBERTO 11/01/18 1348 [KD] Christine Fernandez COTA/L 11/01/18 1339     Row Name 11/01/18 0930             Therapeutic Exercise    Upper Extremity Range of Motion (Therapeutic Exercise) shoulder flexion/extension, bilateral;shoulder abduction/adduction, bilateral;shoulder horizontal abduction/adduction, bilateral;shoulder internal/external rotation, bilateral;elbow flexion/extension, bilateral;forearm supination/pronation, bilateral;wrist flexion/extension, bilateral  -KD      Weight/Resistance (Therapeutic Exercise) 2 pounds  -KD      Exercise Type (Therapeutic Exercise) AROM (active range of motion)  -KD      Position (Therapeutic Exercise) seated  -KD      Sets/Reps (Therapeutic Exercise) 2/20  -KD      Equipment (Therapeutic Exercise) free weight, barbell  -KD      Recorded by [KD] Christine Fernandez COTA/L 11/01/18 1348      Row Name 11/01/18 0930             Static Sitting Balance    Level of [place] (Unsupported Sitting, Static Balance) contact guard assist  -KD      Sitting Position (Unsupported Sitting, Static Balance) sitting on edge of bed  -KD      Time Able to Maintain Position (Unsupported Sitting, Static Balance) 4 to 5 minutes  -KD      Comment (Unsupported Sitting, Static Balance) vc/tc's to prevent LOB  -KD      Recorded by [KD] Christine Fernandez COTA/L 11/01/18 1348      Row Name 11/01/18 0930             Static Standing Balance    Level of [place] (Supported Standing, Static Balance) contact guard assist  -KD      Time Able to Maintain Position (Supported Standing, Static Balance) 2 to 3 minutes  -KD      Assistive Device Utilized (Supported Standing, Static Balance) rolling walker  -KD      Recorded by [KD] Christine Fernandez COTA/L 11/01/18 1348      Row Name 11/01/18 0930 11/01/18 0805          Positioning and Restraints    Pre-Treatment Position sitting in  chair/recliner  -KD in bed  -KD     Post Treatment Position bed  -KD chair  -KD     In Bed supine;call light within reach;encouraged to call for assist;exit alarm on  -KD  --     In Chair  -- sitting;call light within reach;encouraged to call for assist;exit alarm on  -KD     Recorded by [KD] Chrsitine Fernandez COTA/EDILBERTO 11/01/18 1348 [KD] Christine Fernandez MEJIA/L 11/01/18 1339     Row Name 11/01/18 0930 11/01/18 0805          Pain Scale: Numbers Pre/Post-Treatment    Pain Scale: Numbers, Pretreatment 0/10 - no pain  -KD 0/10 - no pain  -KD     Pain Scale: Numbers, Post-Treatment 0/10 - no pain  -KD 0/10 - no pain  -KD     Recorded by [KD] Christine Fernandez MEJIA/EDILBERTO 11/01/18 1348 [KD] Christine Fernandez MEJIA/L 11/01/18 1339     Row Name                Wound 10/27/18 2030 Bilateral midline coccyx pressure injury    Wound - Properties Group Date first assessed: 10/27/18 [LT] Time first assessed: 2030 [LT] Present On Admission : yes;picture taken [LT] Side: Bilateral [LT] Orientation: midline [LT] Location: coccyx [LT] Type: pressure injury [LT] Stage, Pressure Injury: Stage 1 [LT] Additional Comments: is healing [LT] Recorded by:  [LT] Dawna Campbell RN 10/27/18 2216    Row Name 11/01/18 0930 11/01/18 0805          Outcome Summary/Treatment Plan (OT)    Daily Summary of Progress (OT) progress toward functional goals as expected  -KD progress toward functional goals as expected  -KD     Plan for Continued Treatment (OT) cont ot poc  -KD cont ot poc  -KD     Anticipated Discharge Disposition (OT)  -- home with home health  -KD     Recorded by [KD] Christine Fernandez COTA/EDILBERTO 11/01/18 1348 [KD] Christine Fernandez MEJIA/L 11/01/18 1339       User Key  (r) = Recorded By, (t) = Taken By, (c) = Cosigned By    Initials Name Effective Dates Discipline    LT Dawna Campbell RN 10/17/16 -  Nurse    Christine Vergara COTA/L 03/07/18 -  OT        Wound 10/27/18 2030 Bilateral midline coccyx pressure injury (Active)   Dressing Appearance open to  air 11/1/2018  8:35 AM   Drainage Amount none 11/1/2018  8:35 AM             OT Rehab Goals     Row Name 11/01/18 0930 11/01/18 0805          Transfer Goal 1 (OT)    Activity/Assistive Device (Transfer Goal 1, OT) toilet  -KD toilet  -KD     [place] Level/Cues Needed (Transfer Goal 1, OT) minimum assist (75% or more patient effort)  -KD minimum assist (75% or more patient effort)  -KD     Time Frame (Transfer Goal 1, OT) long term goal (LTG);by discharge  -KD long term goal (LTG);by discharge  -KD     Progress/Outcome (Transfer Goal 1, OT) goal not met  -KD goal not met  -KD        Bathing Goal 1 (OT)    Activity/Assistive Device (Bathing Goal 1, OT) bathing skills, all  -KD bathing skills, all  -KD     [place] Level/Cues Needed (Bathing Goal 1, OT) minimum assist (75% or more patient effort)  -KD minimum assist (75% or more patient effort)  -KD     Time Frame (Bathing Goal 1, OT) long term goal (LTG);by discharge  -KD long term goal (LTG);by discharge  -KD     Progress/Outcomes (Bathing Goal 1, OT) goal not met  -KD goal not met  -KD        Dressing Goal 1 (OT)    Activity/Assistive Device (Dressing Goal 1, OT) dressing skills, all  -KD dressing skills, all  -KD     [place]/Cues Needed (Dressing Goal 1, OT) minimum assist (75% or more patient effort)  -KD minimum assist (75% or more patient effort)  -KD     Time Frame (Dressing Goal 1, OT) long term goal (LTG);by discharge  -KD long term goal (LTG);by discharge  -KD     Progress/Outcome (Dressing Goal 1, OT) goal not met  -KD goal not met  -KD        Toileting Goal 1 (OT)    Activity/Device (Toileting Goal 1, OT) toileting skills, all  -KD toileting skills, all  -KD     [place] Level/Cues Needed (Toileting Goal 1, OT) contact guard assist;set-up required;verbal cues required  -KD contact guard assist;set-up required;verbal cues required  -KD     Time Frame (Toileting Goal 1, OT) long term goal (LTG);by discharge  -KD long term goal (LTG);by  discharge  -KD     Progress/Outcome (Toileting Goal 1, OT) goal not met  -KD goal not met  -KD        Patient Education Goal (OT)    Activity (Patient Education Goal, OT) Pt will communicate good home safety awareness recalling at least 5 items.   -KD Pt will communicate good home safety awareness recalling at least 5 items.   -KD     [place]/Cues/Accuracy (Memory Goal 2, OT) verbalizes understanding  -KD verbalizes understanding  -KD     Time Frame (Patient Education Goal, OT) long term goal (LTG);by discharge  -KD long term goal (LTG);by discharge  -KD     Progress/Outcome (Patient Education Goal, OT) goal not met  -KD goal not met  -KD       User Key  (r) = Recorded By, (t) = Taken By, (c) = Cosigned By    Initials Name Provider Type Discipline     Christine Fernandez MEJIA/L Occupational Therapy Assistant OT        Occupational Therapy Education     Title: PT OT SLP Therapies (Active)     Topic: Occupational Therapy (Active)     Point: ADL training (Done)     Description: Instruct learner(s) on proper safety adaptation and remediation techniques during self care or transfers.   Instruct in proper use of assistive devices.   Learning Progress Summary     Learner Status Readiness Method Response Comment Documented by    Patient Done Acceptance E VU,NR Educated about OT and POC. Educated to call for assist. Educated on safety throughout.  10/30/18 1530          Point: Precautions (Done)     Description: Instruct learner(s) on prescribed precautions during self-care and functional transfers.   Learning Progress Summary     Learner Status Readiness Method Response Comment Documented by    Patient Done Acceptance E VU,NR Educated about OT and POC. Educated to call for assist. Educated on safety throughout.  10/30/18 1530                      User Key     Initials Effective Dates Name Provider Type Discipline     06/08/18 -  Anastacia Alexander, OTR/L Occupational Therapist OT                OT Recommendation and  Plan  Outcome Summary/Treatment Plan (OT)  Daily Summary of Progress (OT): progress toward functional goals as expected  Plan for Continued Treatment (OT): cont ot poc  Anticipated Discharge Disposition (OT): home with home health  Therapy Frequency (OT Eval): other (see comments) (5-7days/wk)  Daily Summary of Progress (OT): progress toward functional goals as expected  Plan of Care Review  Plan of Care Reviewed With: patient  Plan of Care Reviewed With: patient  Outcome Summary: bed mobility-Min A, sit/stand/sit-CGA. pt ambulated x 8' CGA x1 w/ RW to BSChair. pt sat eob and completed BUE ther ex with fair/good tolerance. no new goals met this date. cont ot poc.        Outcome Measures     Row Name 11/01/18 0930 11/01/18 0805 10/31/18 1325       How much help from another person do you currently need...    Turning from your back to your side while in flat bed without using bedrails?  --  -- 4  -TW    Moving from lying on back to sitting on the side of a flat bed without bedrails?  --  -- 3  -TW    Moving to and from a bed to a chair (including a wheelchair)?  --  -- 3  -TW    Standing up from a chair using your arms (e.g., wheelchair, bedside chair)?  --  -- 3  -TW    Climbing 3-5 steps with a railing?  --  -- 3  -TW    To walk in hospital room?  --  -- 3  -TW    AM-PAC 6 Clicks Score  --  -- 19  -TW       How much help from another is currently needed...    Putting on and taking off regular lower body clothing? 2  -KD 2  -KD  --    Bathing (including washing, rinsing, and drying) 2  -KD 2  -KD  --    Toileting (which includes using toilet bed pan or urinal) 2  -KD 2  -KD  --    Putting on and taking off regular upper body clothing 3  -KD 3  -KD  --    Taking care of personal grooming (such as brushing teeth) 3  -KD 3  -KD  --    Eating meals 3  -KD 3  -KD  --    Score 15  -KD 15  -KD  --       Functional Assessment    Outcome Measure Options  --  -- AM-PAC 6 Clicks Basic Mobility (PT)  -TW    Row Name 10/31/18  0920 10/30/18 1047 10/30/18 0845       How much help from another person do you currently need...    Turning from your back to your side while in flat bed without using bedrails?  --  -- 4  -JA    Moving from lying on back to sitting on the side of a flat bed without bedrails?  --  -- 3  -JA    Moving to and from a bed to a chair (including a wheelchair)?  --  -- 3  -JA    Standing up from a chair using your arms (e.g., wheelchair, bedside chair)?  --  -- 3  -JA    Climbing 3-5 steps with a railing?  --  -- 3  -JA    To walk in hospital room?  --  -- 3  -JA    AM-PAC 6 Clicks Score  --  -- 19  -JA       How much help from another is currently needed...    Putting on and taking off regular lower body clothing? 2  -KD 2  -BH  --    Bathing (including washing, rinsing, and drying) 2  -KD 2  -BH  --    Toileting (which includes using toilet bed pan or urinal) 2  -KD 2  -BH  --    Putting on and taking off regular upper body clothing 3  -KD 3  -BH  --    Taking care of personal grooming (such as brushing teeth) 3  -KD 3  -BH  --    Eating meals 3  -KD 3  -BH  --    Score 15  -KD 15  -BH  --       Functional Assessment    Outcome Measure Options  -- AM-PAC 6 Clicks Daily Activity (OT)  -[facility] 6 Clicks Basic Mobility (PT)  -    Row Name 10/29/18 1638             How much help from another person do you currently need...    Turning from your back to your side while in flat bed without using bedrails? 4  -MARLON      Moving from lying on back to sitting on the side of a flat bed without bedrails? 3  -MARLON      Moving to and from a bed to a chair (including a wheelchair)? 3  -MARLON      Standing up from a chair using your arms (e.g., wheelchair, bedside chair)? 3  -MARLON      Climbing 3-5 steps with a railing? 3  -MARLON      To walk in hospital room? 3  -MARLON      AM-PAC 6 Clicks Score 19  -MARLON         Functional Assessment    Outcome Measure Options AM-PAC 6 Clicks Basic Mobility (PT)  -        User Key  (r) = Recorded By, (t) = Taken  By, (c) = Cosigned By    Initials Name Provider Type     Anastacia Alexander, OTR/L Occupational Therapist    Sylvie Vegas, PT Physical Therapist    Gurmeet Dinh, PTA Physical Therapy Assistant    TW Jaron Penn, PTA Physical Therapy Assistant    KD Christine Fernandez MEJIA/L Occupational Therapy Assistant           Time Calculation:         Time Calculation- OT     Row Name 11/01/18 1358 11/01/18 1340          Time Calculation- OT    OT Start Time 0930  -KD 0805  -KD     OT Stop Time 1039  -KD 0833  -KD     OT Time Calculation (min) 69 min  -KD 28 min  -KD     Total Timed Code Minutes- OT 69 minute(s)  -KD 28 minute(s)  -KD     OT Received On 11/01/18  -KD 11/01/18  -KD       User Key  (r) = Recorded By, (t) = Taken By, (c) = Cosigned By    Initials Name Provider Type    Christine Vergara, MEJIA/L Occupational Therapy Assistant           Therapy Suggested Charges     Code   Minutes Charges    None           Therapy Charges for Today     Code Description Service Date Service Provider Modifiers Qty    68727574522 HC OT THER PROC EA 15 MIN 10/31/2018 Christine Fernandez MEJIA/L GO 2    67176824345 HC OT THERAPEUTIC ACT EA 15 MIN 10/31/2018 Christine Fernandez MEJIA/L GO 1    06300751085 HC OT SELF CARE/MGMT/TRAIN EA 15 MIN 11/1/2018 Christine Fernandez MEJIA/L GO 2    45441783267 HC OT SELF CARE/MGMT/TRAIN EA 15 MIN 11/1/2018 Christine Fernandez MEJIA/L GO 2    92788709352 HC OT THER PROC EA 15 MIN 11/1/2018 Christine Fernandez MEJIA/L GO 2    48597525272 HC OT THERAPEUTIC ACT EA 15 MIN 11/1/2018 Christine Fernandez MEJIA/L GO 1          OT G-codes  OT Professional Judgement Used?: Yes  OT Functional Scales Options: AM-PAC 6 Clicks Daily Activity (OT)  Score: 15  Functional Limitation: Self care  Self Care Current Status (): At least 40 percent but less than 60 percent impaired, limited or restricted  Self Care Goal Status (): At least 20 percent but less than 40 percent impaired, limited or restricted    Christine BATISTA  ROBERTO Fernandez/L  11/1/2018

## 2018-11-01 NOTE — THERAPY TREATMENT NOTE
Acute Care - Occupational Therapy Treatment Note  [facility]     Patient Name: Minerva Madrid  : 1935  MRN: [id number]  Today's Date: 2018  Onset of Illness/Injury or Date of Surgery: 10/27/18  Date of Referral to OT: 10/29/18  Referring Physician: Dr. Simon    Admit Date: 10/27/2018       ICD-10-CM ICD-9-CM   1. Lower abdominal pain R10.30 789.09   2. Sepsis, due to unspecified organism (CMS/HCC) A41.9 038.9     995.91   3. Impaired functional mobility, balance, gait, and endurance Z74.09 V49.89   4. Essential hypertension, benign I10 401.1   5. Elevated troponin R74.8 790.6   6. Atherosclerosis of native coronary artery of native heart, angina presence unspecified I25.10 414.01   7. 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 Impaired mobility and ADLs Z74.09 799.89     Patient Active Problem List   Diagnosis   • History of hemiarthroplasty of left hip   • CAD (coronary artery disease)   • Osteoporosis   • HTN (hypertension)   • Tobacco use   • Carotid stenosis, asymptomatic   • Chronic low back pain   • Hyponatremia   • PVD (peripheral vascular disease) (CMS/HCC)   • Lower abdominal pain   • Sepsis (CMS/HCC)   • Lactic acid acidosis   • Hypertensive urgency     Past Medical History:   Diagnosis Date   • Appetite loss    • Arthritis    • Coronary artery disease    • Coughing    • Diabetes mellitus (CMS/HCC)    • Elevated cholesterol    • Essential hypertension    • Leg pain    • Muscle weakness    • Postmenopausal bleeding     With thickened endometrial stripe    • Stroke (CMS/HCC) 2016     Past Surgical History:   Procedure Laterality Date   • CARDIAC SURGERY     • CAROTID ENDARTERECTOMY     • JOINT REPLACEMENT Left 2017    partial hip replacement   • MASTOID SURGERY         Therapy Treatment          Rehabilitation Treatment Summary     Row Name 18 0805             Treatment Time/Intention    Discipline  occupational therapy assistant  -KD      Document Type therapy note (daily note)  -KD      Subjective Information no complaints  -KD      Mode of Treatment occupational therapy  -KD      Patient/Family Observations no family present  -KD      Therapy Frequency (OT Eval) other (see comments)   5-7days/wk  -KD      Patient Effort good  -KD      Existing Precautions/Restrictions fall  -KD      Equipment Issued to Patient gait belt  -KD      Recorded by [KD] Christine Fernandez MEJIA/L 11/01/18 1335      Row Name 11/01/18 0805             Vital Signs    Pre Systolic BP Rehab 160  -KD      Pre Treatment Diastolic BP 78  -KD      Pretreatment Heart Rate (beats/min) 72  -KD      Posttreatment Heart Rate (beats/min) 76  -KD      Pre SpO2 (%) 96  -KD      O2 Delivery Pre Treatment room air  -KD      Post SpO2 (%) 96  -KD      O2 Delivery Post Treatment room air  -KD      Pre Patient Position Supine  -KD      Intra Patient Position Standing  -KD      Post Patient Position Sitting  -KD      Recorded by [KD] Christine Fernandez MEJIA/L 11/01/18 1339      Row Name 11/01/18 0805             Cognitive Assessment/Intervention- PT/OT    Affect/Mental Status (Cognitive) WFL  -KD      Orientation Status (Cognition) oriented x 4  -KD      Follows Commands (Cognition) WFL  -KD      Personal Safety Interventions gait belt;fall prevention program maintained  -KD      Recorded by [KD] Christine Fernandez COTA/L 11/01/18 1339      Row Name 11/01/18 0805             Bed Mobility Assessment/Treatment    Bed Mobility Assessment/Treatment supine-sit  -KD      Supine-Sit [place] (Bed Mobility) minimum assist (75% patient effort)  -KD      Bed Mobility, Safety Issues decreased use of arms for pushing/pulling  -KD      Assistive Device (Bed Mobility) head of bed elevated  -KD      Recorded by [KD] Christine Fernandez COTA/L 11/01/18 1339      Row Name 11/01/18 0805             Functional Mobility    Functional Mobility- Ind. Level contact guard assist  -KD       Functional Mobility- Device rolling walker  -KD      Functional Mobility-Distance (Feet) 10  -KD      Recorded by [KD] Christine Fernandez COTA/L 11/01/18 1339      Row Name 11/01/18 0805             Transfer Assessment/Treatment    Transfer Assessment/Treatment sit-stand transfer;stand-sit transfer;toilet transfer;bed-chair transfer  -KD      Recorded by [KD] Christine Fernandez MEJIA/L 11/01/18 1339      Row Name 11/01/18 0805             Bed-Chair Transfer    Bed-Chair [place] (Transfers) contact guard  -KD      Assistive Device (Bed-Chair Transfers) walker, front-wheeled  -KD      Recorded by [KD] Christine Fernandez COTA/L 11/01/18 1339      Row Name 11/01/18 0805             Sit-Stand Transfer    Sit-Stand [place] (Transfers) contact guard  -KD      Assistive Device (Sit-Stand Transfers) walker, front-wheeled  -KD      Recorded by [KD] Christine Fernandez COTA/L 11/01/18 1339      Row Name 11/01/18 0805             Stand-Sit Transfer    Stand-Sit [place] (Transfers) contact guard  -KD      Assistive Device (Stand-Sit Transfers) walker, front-wheeled  -KD      Recorded by [KD] Christine Fernandez MEJIA/L 11/01/18 1339      Row Name 11/01/18 0805             Toilet Transfer    Type (Toilet Transfer) sit-stand;stand-sit  -KD      [place] Level (Toilet Transfer) minimum assist (75% patient effort)  -KD      Assistive Device (Toilet Transfer) commode;walker, front-wheeled;grab bars/safety frame  -KD      Recorded by [KD] Christine Fernandez COTA/L 11/01/18 1339      Row Name 11/01/18 0805             Grooming Assessment/Training    [place] Level (Grooming) grooming skills;wash face, hands;supervision  -KD      Grooming Position supported sitting  -KD      Recorded by [KD] Christine Fernandez MEJIA/L 11/01/18 1339      Row Name 11/01/18 0805             Toileting Assessment/Training    [place] Level (Toileting) toileting skills;adjust/manage clothing;perform perineal hygiene;contact guard assist  -KD       Assistive Devices (Toileting) commode;grab bar/safety frame  -KD      Toileting Position supported sitting  -KD      Recorded by [KD] Christine Fernandez COTA/L 11/01/18 1339      Row Name 11/01/18 0805             Positioning and Restraints    Pre-Treatment Position in bed  -KD      Post Treatment Position chair  -KD      In Chair sitting;call light within reach;encouraged to call for assist;exit alarm on  -KD      Recorded by [AC] Christine Fernandez COTA/L 11/01/18 1339      Row Name 11/01/18 0805             Pain Scale: Numbers Pre/Post-Treatment    Pain Scale: Numbers, Pretreatment 0/10 - no pain  -KD      Pain Scale: Numbers, Post-Treatment 0/10 - no pain  -KD      Recorded by [AC] Christine Fernandez COTA/L 11/01/18 1339      Row Name                Wound 10/27/18 2030 Bilateral midline coccyx pressure injury    Wound - Properties Group Date first assessed: 10/27/18 [LT] Time first assessed: 2030 [LT] Present On Admission : yes;picture taken [LT] Side: Bilateral [LT] Orientation: midline [LT] Location: coccyx [LT] Type: pressure injury [LT] Stage, Pressure Injury: Stage 1 [LT] Additional Comments: is healing [LT] Recorded by:  [LT] Dawna Campbell RN 10/27/18 2216    Row Name 11/01/18 0805             Outcome Summary/Treatment Plan (OT)    Daily Summary of Progress (OT) progress toward functional goals as expected  -KD      Plan for Continued Treatment (OT) cont ot poc  -KD      Anticipated Discharge Disposition (OT) home with home health  -KD      Recorded by [KD] Christine Fernandez COTA/L 11/01/18 1339        User Key  (r) = Recorded By, (t) = Taken By, (c) = Cosigned By    Initials Name Effective Dates Discipline    LT Dawna Campbell RN 10/17/16 -  Nurse    Christine Vergara COTA/L 03/07/18 -  OT        Wound 10/27/18 2030 Bilateral midline coccyx pressure injury (Active)   Dressing Appearance open to air 11/1/2018  8:35 AM   Drainage Amount none 11/1/2018  8:35 AM             OT Rehab Goals     Row Name 11/01/18  0805             Transfer Goal 1 (OT)    Activity/Assistive Device (Transfer Goal 1, OT) toilet  -KD      [place] Level/Cues Needed (Transfer Goal 1, OT) minimum assist (75% or more patient effort)  -KD      Time Frame (Transfer Goal 1, OT) long term goal (LTG);by discharge  -KD      Progress/Outcome (Transfer Goal 1, OT) goal not met  -KD         Bathing Goal 1 (OT)    Activity/Assistive Device (Bathing Goal 1, OT) bathing skills, all  -KD      [place] Level/Cues Needed (Bathing Goal 1, OT) minimum assist (75% or more patient effort)  -KD      Time Frame (Bathing Goal 1, OT) long term goal (LTG);by discharge  -KD      Progress/Outcomes (Bathing Goal 1, OT) goal not met  -KD         Dressing Goal 1 (OT)    Activity/Assistive Device (Dressing Goal 1, OT) dressing skills, all  -KD      [place]/Cues Needed (Dressing Goal 1, OT) minimum assist (75% or more patient effort)  -KD      Time Frame (Dressing Goal 1, OT) long term goal (LTG);by discharge  -KD      Progress/Outcome (Dressing Goal 1, OT) goal not met  -KD         Toileting Goal 1 (OT)    Activity/Device (Toileting Goal 1, OT) toileting skills, all  -KD      [place] Level/Cues Needed (Toileting Goal 1, OT) contact guard assist;set-up required;verbal cues required  -KD      Time Frame (Toileting Goal 1, OT) long term goal (LTG);by discharge  -KD      Progress/Outcome (Toileting Goal 1, OT) goal not met  -KD         Patient Education Goal (OT)    Activity (Patient Education Goal, OT) Pt will communicate good home safety awareness recalling at least 5 items.   -KD      [place]/Cues/Accuracy (Memory Goal 2, OT) verbalizes understanding  -KD      Time Frame (Patient Education Goal, OT) long term goal (LTG);by discharge  -KD      Progress/Outcome (Patient Education Goal, OT) goal not met  -KD        User Key  (r) = Recorded By, (t) = Taken By, (c) = Cosigned By    Initials Name Provider Type Discipline    KD Christine Fernandez COTA/EDILBERTO Gamez  Therapy Assistant OT        Occupational Therapy Education     Title: PT OT SLP Therapies (Active)     Topic: Occupational Therapy (Active)     Point: ADL training (Done)     Description: Instruct learner(s) on proper safety adaptation and remediation techniques during self care or transfers.   Instruct in proper use of assistive devices.   Learning Progress Summary     Learner Status Readiness Method Response Comment Documented by    Patient Done Acceptance E VU,NR Educated about OT and POC. Educated to call for assist. Educated on safety throughout.  10/30/18 1530          Point: Precautions (Done)     Description: Instruct learner(s) on prescribed precautions during self-care and functional transfers.   Learning Progress Summary     Learner Status Readiness Method Response Comment Documented by    Patient Done Acceptance E VU,NR Educated about OT and POC. Educated to call for assist. Educated on safety throughout.  10/30/18 1530                      User Key     Initials Effective Dates Name Provider Type Discipline     06/08/18 -  Anastacia Alexander, OTR/L Occupational Therapist OT                OT Recommendation and Plan  Outcome Summary/Treatment Plan (OT)  Daily Summary of Progress (OT): progress toward functional goals as expected  Plan for Continued Treatment (OT): cont ot poc  Anticipated Discharge Disposition (OT): home with home health  Therapy Frequency (OT Eval): other (see comments) (5-7days/wk)  Daily Summary of Progress (OT): progress toward functional goals as expected  Plan of Care Review  Plan of Care Reviewed With: patient  Plan of Care Reviewed With: patient  Outcome Summary: bed mobility-Min A, sit/stand/sit-CGA. pt ambulated x 8' CGA x1 w/ RW to BSChair. pt sat eob and completed BUE ther ex with fair/good tolerance. no new goals met this date. cont ot poc.        Outcome Measures     Row Name 11/01/18 0805 10/31/18 1325 10/31/18 0920       How much help from another person do you  currently need...    Turning from your back to your side while in flat bed without using bedrails?  -- 4  -TW  --    Moving from lying on back to sitting on the side of a flat bed without bedrails?  -- 3  -TW  --    Moving to and from a bed to a chair (including a wheelchair)?  -- 3  -TW  --    Standing up from a chair using your arms (e.g., wheelchair, bedside chair)?  -- 3  -TW  --    Climbing 3-5 steps with a railing?  -- 3  -TW  --    To walk in hospital room?  -- 3  -TW  --    AM-PAC 6 Clicks Score  -- 19 -TW  --       How much help from another is currently needed...    Putting on and taking off regular lower body clothing? 2  -KD  -- 2  -KD    Bathing (including washing, rinsing, and drying) 2  -KD  -- 2  -KD    Toileting (which includes using toilet bed pan or urinal) 2  -KD  -- 2  -KD    Putting on and taking off regular upper body clothing 3  -KD  -- 3  -KD    Taking care of personal grooming (such as brushing teeth) 3  -KD  -- 3  -KD    Eating meals 3  -KD  -- 3  -KD    Score 15  -KD  -- 15  -KD       Functional Assessment    Outcome Measure Options  -- AM-PAC 6 Clicks Basic Mobility (PT)  -TW  --    Row Name 10/30/18 1047 10/30/18 0845 10/29/18 6658       How much help from another person do you currently need...    Turning from your back to your side while in flat bed without using bedrails?  -- 4  -JA 4  -MARLON    Moving from lying on back to sitting on the side of a flat bed without bedrails?  -- 3  -JA 3  -MARLON    Moving to and from a bed to a chair (including a wheelchair)?  -- 3  -JA 3  -MARLON    Standing up from a chair using your arms (e.g., wheelchair, bedside chair)?  -- 3  -JA 3  -MARLON    Climbing 3-5 steps with a railing?  -- 3  -JA 3  -MARLON    To walk in hospital room?  -- 3  -JA 3  -MARLON    AM-PAC 6 Clicks Score  -- 19  -JA 19  -MARLON       How much help from another is currently needed...    Putting on and taking off regular lower body clothing? 2  -BH  --  --    Bathing (including washing, rinsing, and  drying) 2  -  --  --    Toileting (which includes using toilet bed pan or urinal) 2  -BH  --  --    Putting on and taking off regular upper body clothing 3  -BH  --  --    Taking care of personal grooming (such as brushing teeth) 3  -BH  --  --    Eating meals 3  -BH  --  --    Score 15  -BH  --  --       Functional Assessment    Outcome Measure Options AM-PAC 6 Clicks Daily Activity (OT)  - AM-PAC 6 Clicks Basic Mobility (PT)  -BRYNN AM-PAC 6 Clicks Basic Mobility (PT)  -MARLON      User Key  (r) = Recorded By, (t) = Taken By, (c) = Cosigned By    Initials Name Provider Type     Anastacia Alexander, OTR/L Occupational Therapist    Sylvie Vegas, PT Physical Therapist    Gurmeet Dinh, PTA Physical Therapy Assistant    TW Jaron Penn, PTA Physical Therapy Assistant    Christine Vergara, MEJIA/L Occupational Therapy Assistant           Time Calculation:         Time Calculation- OT     Row Name 11/01/18 1340             Time Calculation- OT    OT Start Time 0805  -KD      OT Stop Time 0833  -KD      OT Time Calculation (min) 28 min  -KD      Total Timed Code Minutes- OT 28 minute(s)  -KD      OT Received On 11/01/18  -        User Key  (r) = Recorded By, (t) = Taken By, (c) = Cosigned By    Initials Name Provider Type     Christine Fernandez MEJIA/L Occupational Therapy Assistant           Therapy Suggested Charges     Code   Minutes Charges    None           Therapy Charges for Today     Code Description Service Date Service Provider Modifiers Qty    93486521267 HC OT THER PROC EA 15 MIN 10/31/2018 Christine Fernandez COTA/L GO 2    18830260830 HC OT THERAPEUTIC ACT EA 15 MIN 10/31/2018 Christine Fernandez COTA/L GO 1    29413934430 HC OT SELF CARE/MGMT/TRAIN EA 15 MIN 11/1/2018 Christine Fernandez COTA/L GO 2          OT G-codes  OT Professional Judgement Used?: Yes  OT Functional Scales Options: AM-PAC 6 Clicks Daily Activity (OT)  Score: 15  Functional Limitation: Self care  Self Care Current Status  (): At least 40 percent but less than 60 percent impaired, limited or restricted  Self Care Goal Status (): At least 20 percent but less than 40 percent impaired, limited or restricted    ROBERTO Kenny/EDILBERTO  11/1/2018

## 2018-11-01 NOTE — DISCHARGE SUMMARY
[facility] Medicine Services  DISCHARGE SUMMARY       Date of Admission: 10/27/2018  Date of Discharge:  11/1/2018  Primary Care Physician: Ariana Adkins DO    Presenting Problem/History of Present Illness:  Lower abdominal pain [R10.30]  Sepsis, due to unspecified organism (CMS/[facility]) [A41.9]       Final Discharge Diagnoses:  Active Hospital Problems    Diagnosis   • **Sepsis (CMS/[facility])   • Lower abdominal pain   • Lactic acid acidosis   • Hypertensive urgency   • PVD (peripheral vascular disease) (CMS/[facility])   • Hyponatremia   • Tobacco use   • HTN (hypertension)   • CAD (coronary artery disease)   • Carotid stenosis, asymptomatic       Consults:   Consults     No orders found from 9/28/2018 to 10/28/2018.        Pertinent Test Results:   Lab Results (most recent)     Procedure Component Value Units Date/Time    Blood Culture - Blood, [[id number]]  (Normal) Collected:  10/27/18 1821    Specimen:  Blood from Arm, Left Updated:  10/31/18 1830     Blood Culture No growth at 4 days    Blood Culture - Blood, [[id number]]  (Normal) Collected:  10/27/18 1821    Specimen:  Blood from Arm, Left Updated:  10/31/18 1830     Blood Culture No growth at 4 days    Basic Metabolic Panel [[id number]]  (Abnormal) Collected:  10/31/18 0528    Specimen:  Blood Updated:  10/31/18 0613     Glucose 108 (H) mg/dL      BUN 14 mg/dL      Creatinine 0.54 mg/dL      Sodium 133 (L) mmol/L      Potassium 4.0 mmol/L      Chloride 103 mmol/L      CO2 23.0 mmol/L      Calcium 8.2 (L) mg/dL      eGFR Non African Amer 108 (H) mL/min/1.73      BUN/Creatinine Ratio 25.9 (H)     Anion Gap 7.0 mmol/L     Narrative:       The MDRD GFR formula is only valid for adults with stable renal function between ages 18 and 70.    CBC & Differential [[id number]] Collected:  10/31/18 0528    Specimen:  Blood Updated:  10/31/18 0549    Narrative:       The following orders were created for panel order CBC & Differential.  Procedure                                Abnormality         Status                     ---------                               -----------         ------                     CBC Auto Differential[[id number]]        Abnormal            Final result                 Please view results for these tests on the individual orders.    CBC Auto Differential [[id number]]  (Abnormal) Collected:  10/31/18 0528    Specimen:  Blood Updated:  10/31/18 0549     WBC 8.70 10*3/mm3      RBC 3.71 (L) 10*6/mm3      Hemoglobin 11.9 (L) g/dL      Hematocrit 33.3 (L) %      MCV 89.8 fL      MCH 32.1 pg      MCHC 35.7 g/dL      RDW 12.2 %      RDW-SD 40.1 fl      MPV 9.7 fL      Platelets 248 10*3/mm3      Neutrophil % 63.7 %      Lymphocyte % 17.4 %      Monocyte % 11.8 %      Eosinophil % 6.0 %      Basophil % 0.2 %      Immature Grans % 0.9 (H) %      Neutrophils, Absolute 5.54 10*3/mm3      Lymphocytes, Absolute 1.51 10*3/mm3      Monocytes, Absolute 1.03 (H) 10*3/mm3      Eosinophils, Absolute 0.52 10*3/mm3      Basophils, Absolute 0.02 10*3/mm3      Immature Grans, Absolute 0.08 (H) 10*3/mm3     Potassium [[id number]]  (Normal) Collected:  10/30/18 1536    Specimen:  Blood Updated:  10/30/18 1559     Potassium 4.0 mmol/L     Occult Blood X 1, Stool - Stool, [[id number]]  (Normal) Collected:  10/30/18 0937    Specimen:  Stool Updated:  10/30/18 1233     Fecal Occult Blood Negative    Basic Metabolic Panel [[id number]]  (Abnormal) Collected:  10/30/18 0512    Specimen:  Blood Updated:  10/30/18 0609     Glucose 102 (H) mg/dL      BUN 11 mg/dL      Creatinine 0.49 (L) mg/dL      Sodium 134 (L) mmol/L      Potassium 3.4 (L) mmol/L      Chloride 101 mmol/L      CO2 26.0 mmol/L      Calcium 8.3 (L) mg/dL      eGFR Non African Amer 121 (H) mL/min/1.73      BUN/Creatinine Ratio 22.4     Anion Gap 7.0 mmol/L     Narrative:       The MDRD GFR formula is only valid for adults with stable renal function between ages 18 and 70.    CBC & Differential [[id number]]  Collected:  10/30/18 0512    Specimen:  Blood Updated:  10/30/18 0553    Narrative:       The following orders were created for panel order CBC & Differential.  Procedure                               Abnormality         Status                     ---------                               -----------         ------                     CBC Auto Differential[[id number]]        Abnormal            Final result                 Please view results for these tests on the individual orders.    CBC Auto Differential [[id number]]  (Abnormal) Collected:  10/30/18 0512    Specimen:  Blood Updated:  10/30/18 0553     WBC 10.24 (H) 10*3/mm3      RBC 3.93 10*6/mm3      Hemoglobin 12.5 g/dL      Hematocrit 35.5 %      MCV 90.3 fL      MCH 31.8 pg      MCHC 35.2 g/dL      RDW 12.2 %      RDW-SD 40.4 fl      MPV 10.0 fL      Platelets 284 10*3/mm3      Neutrophil % 74.0 %      Lymphocyte % 14.0 %      Monocyte % 8.5 %      Eosinophil % 2.9 %      Basophil % 0.2 %      Immature Grans % 0.4 %      Neutrophils, Absolute 7.58 10*3/mm3      Lymphocytes, Absolute 1.43 10*3/mm3      Monocytes, Absolute 0.87 10*3/mm3      Eosinophils, Absolute 0.30 10*3/mm3      Basophils, Absolute 0.02 10*3/mm3      Immature Grans, Absolute 0.04 (H) 10*3/mm3     Basic Metabolic Panel [[id number]]  (Abnormal) Collected:  10/29/18 1104    Specimen:  Blood Updated:  10/29/18 1200     Glucose 95 mg/dL      BUN 8 mg/dL      Creatinine 0.50 mg/dL      Sodium 131 (L) mmol/L      Potassium 3.6 mmol/L      Chloride 106 mmol/L      CO2 21.0 (L) mmol/L      Calcium 7.4 (L) mg/dL      eGFR Non African Amer 118 (H) mL/min/1.73      BUN/Creatinine Ratio 16.0     Anion Gap 4.0 (L) mmol/L     Narrative:       The MDRD GFR formula is only valid for adults with stable renal function between ages 18 and 70.    CBC & Differential [[id number]] Collected:  10/29/18 1104    Specimen:  Blood Updated:  10/29/18 1123    Narrative:       The following orders were created for panel order  CBC & Differential.  Procedure                               Abnormality         Status                     ---------                               -----------         ------                     CBC Auto Differential[[id number]]        Abnormal            Final result                 Please view results for these tests on the individual orders.    CBC Auto Differential [[id number]]  (Abnormal) Collected:  10/29/18 1104    Specimen:  Blood Updated:  10/29/18 1123     WBC 9.96 (H) 10*3/mm3      RBC 3.39 (L) 10*6/mm3      Hemoglobin 11.0 (L) g/dL      Hematocrit 30.7 (L) %      MCV 90.6 fL      MCH 32.4 pg      MCHC 35.8 g/dL      RDW 12.5 %      RDW-SD 41.4 fl      MPV 9.7 fL      Platelets 235 10*3/mm3      Neutrophil % 76.0 %      Lymphocyte % 11.6 %      Monocyte % 9.5 %      Eosinophil % 2.3 %      Basophil % 0.2 %      Immature Grans % 0.4 %      Neutrophils, Absolute 7.56 10*3/mm3      Lymphocytes, Absolute 1.16 10*3/mm3      Monocytes, Absolute 0.95 (H) 10*3/mm3      Eosinophils, Absolute 0.23 10*3/mm3      Basophils, Absolute 0.02 10*3/mm3      Immature Grans, Absolute 0.04 (H) 10*3/mm3     Urinalysis With Culture If Indicated - Urine, Catheter [[id number]]  (Normal) Collected:  10/29/18 0844    Specimen:  Urine from Urine, Catheter Updated:  10/29/18 0917     Color, UA Yellow     Appearance, UA Clear     pH, UA 5.5     Specific Gravity, UA 1.007     Glucose, UA Negative     Ketones, UA Negative     Bilirubin, UA Negative     Blood, UA Negative     Protein, UA Negative     Leuk Esterase, UA Negative     Nitrite, UA Negative     Urobilinogen, UA 0.2 E.U./dL    Narrative:       Urine microscopic not indicated.    Troponin [[id number]]  (Abnormal) Collected:  10/28/18 2322    Specimen:  Blood Updated:  10/29/18 0011     Troponin I 0.048 (H) ng/mL     Troponin [[id number]]  (Abnormal) Collected:  10/28/18 1427    Specimen:  Blood Updated:  10/28/18 1554     Troponin I 0.060 (H) ng/mL     Troponin [[id number]]   (Abnormal) Collected:  10/28/18 1244    Specimen:  Blood Updated:  10/28/18 1324     Troponin I 0.077 (H) ng/mL     Comprehensive Metabolic Panel [[id number]]  (Abnormal) Collected:  10/28/18 0557    Specimen:  Blood Updated:  10/28/18 0711     Glucose 137 (H) mg/dL      BUN 13 mg/dL      Creatinine 0.53 mg/dL      Sodium 133 (L) mmol/L      Potassium 3.2 (L) mmol/L      Chloride 99 mmol/L      CO2 27.0 mmol/L      Calcium 8.4 mg/dL      Total Protein 5.8 (L) g/dL      Albumin 3.30 (L) g/dL      ALT (SGPT) 24 U/L      AST (SGOT) 26 U/L      Alkaline Phosphatase 39 U/L      Total Bilirubin 0.5 mg/dL      eGFR Non African Amer 110 (H) mL/min/1.73      Globulin 2.5 gm/dL      A/G Ratio 1.3 g/dL      BUN/Creatinine Ratio 24.5     Anion Gap 7.0 mmol/L     Narrative:       The MDRD GFR formula is only valid for adults with stable renal function between ages 18 and 70.    CBC Auto Differential [[id number]]  (Abnormal) Collected:  10/28/18 0557    Specimen:  Blood Updated:  10/28/18 0657     WBC 10.13 (H) 10*3/mm3      RBC 3.84 10*6/mm3      Hemoglobin 12.3 g/dL      Hematocrit 34.3 (L) %      MCV 89.3 fL      MCH 32.0 pg      MCHC 35.9 g/dL      RDW 12.2 %      RDW-SD 39.6 fl      MPV 10.5 fL      Platelets 264 10*3/mm3      Neutrophil % 82.1 (H) %      Lymphocyte % 8.4 (L) %      Monocyte % 8.6 %      Eosinophil % 0.4 %      Basophil % 0.0 %      Immature Grans % 0.5 %      Neutrophils, Absolute 8.32 10*3/mm3      Lymphocytes, Absolute 0.85 10*3/mm3      Monocytes, Absolute 0.87 10*3/mm3      Eosinophils, Absolute 0.04 10*3/mm3      Basophils, Absolute 0.00 10*3/mm3      Immature Grans, Absolute 0.05 (H) 10*3/mm3      nRBC 0.0 /100 WBC     Urinalysis, Microscopic Only - Urine, Clean Catch [[id number]]  (Abnormal) Collected:  10/28/18 0350    Specimen:  Urine from Urine, Clean Catch Updated:  10/28/18 0421     RBC, UA 0-2 (A) /HPF      WBC, UA 0-2 /HPF      Bacteria, UA None Seen /HPF      Squamous Epithelial Cells, UA None  Seen /HPF      Hyaline Casts, UA 3-6 /LPF      Methodology Automated Microscopy    Urinalysis With Culture If Indicated - Urine, Clean Catch [[id number]]  (Abnormal) Collected:  10/28/18 0350    Specimen:  Urine from Urine, Clean Catch Updated:  10/28/18 0419     Color, UA Yellow     Appearance, UA Clear     pH, UA 6.5     Specific Gravity, UA 1.019     Glucose,  mg/dL (Trace) (A)     Ketones, UA 40 mg/dL (2+) (A)     Bilirubin, UA Negative     Blood, UA Negative     Protein,  mg/dL (2+) (A)     Leuk Esterase, UA Negative     Nitrite, UA Negative     Urobilinogen, UA 0.2 E.U./dL    Troponin [[id number]]  (Abnormal) Collected:  10/27/18 2041    Specimen:  Blood Updated:  10/27/18 2125     Troponin I 0.084 (H) ng/mL     Lactic Acid, Reflex [[id number]]  (Normal) Collected:  10/27/18 2041    Specimen:  Blood Updated:  10/27/18 2111     Lactate 1.7 mmol/L     [place] Draw [[id number]] Collected:  10/27/18 1821    Specimen:  Blood Updated:  10/27/18 2033    Narrative:       The following orders were created for panel order [place] Draw.  Procedure                               Abnormality         Status                     ---------                               -----------         ------                     Light Blue Top[[id number]]                                   Final result               Lavender Top[[id number]]                                                                Gold Top - SST[[id number]]                                   Final result                 Please view results for these tests on the individual orders.    Lactic Acid, Reflex Timer (This will reflex a repeat order 3-3:15 hours after ordered.) [[id number]] Collected:  10/27/18 1631    Specimen:  Blood Updated:  10/27/18 2015     Extra Tube Hold for add-ons.     Comment: Auto resulted.       Light Blue Top [[id number]] Collected:  10/27/18 1822    Specimen:  Blood Updated:  10/27/18 1930     Extra Tube hold for add-on     Comment: Auto  resulted       Gold Top - [facility] [[id number]] Collected:  10/27/18 1821    Specimen:  Blood Updated:  10/27/18 1930     Extra Tube Hold for add-ons.     Comment: Auto resulted.       Influenza Antigen, Rapid - Swab, Nasopharynx [[id number]]  (Normal) Collected:  10/27/18 1900    Specimen:  Swab from Nasopharynx Updated:  10/27/18 1921     Influenza A Ag, EIA Negative     Influenza B Ag, EIA Negative    Lipase [[id number]]  (Normal) Collected:  10/27/18 1623    Specimen:  Blood Updated:  10/27/18 1912     Lipase 24 U/L     Amylase [[id number]]  (Abnormal) Collected:  10/27/18 1623    Specimen:  Blood Updated:  10/27/18 1912     Amylase 137 (H) U/L     CBC & Differential [[id number]] Collected:  10/27/18 1623    Specimen:  Blood Updated:  10/27/18 1907    Narrative:       The following orders were created for panel order CBC & Differential.  Procedure                               Abnormality         Status                     ---------                               -----------         ------                     Manual Differential[[id number]]          Abnormal            Final result               Scan Slide[[id number]]                                                                  CBC Auto Differential[[id number]]        Abnormal            Final result                 Please view results for these tests on the individual orders.    Manual Differential [[id number]]  (Abnormal) Collected:  10/27/18 1623    Specimen:  Blood Updated:  10/27/18 1907     Neutrophil % 92.0 (H) %      Lymphocyte % 6.0 (L) %      Monocyte % 2.0 %      Neutrophils Absolute 20.81 (H) 10*3/mm3      Lymphocytes Absolute 1.36 10*3/mm3      Monocytes Absolute 0.45 10*3/mm3      Anisocytosis Slight/1+     Toxic Granulation Slight/1+     Vacuolated Neutrophils Slight/1+     Platelet Morphology Normal    Lactic Acid, Plasma [[id number]]  (Abnormal) Collected:  10/27/18 1631    Specimen:  Blood Updated:  10/27/18 1710     Lactate 3.6 (C) mmol/L     Troponin  [[id number]]  (Abnormal) Collected:  10/27/18 1623    Specimen:  Blood Updated:  10/27/18 1706     Troponin I 0.052 (H) ng/mL     CBC Auto Differential [[id number]]  (Abnormal) Collected:  10/27/18 1623    Specimen:  Blood Updated:  10/27/18 1658     WBC 22.62 (H) 10*3/mm3      RBC 4.84 10*6/mm3      Hemoglobin 15.9 (H) g/dL      Hematocrit 42.8 %      MCV 88.4 fL      MCH 32.9 pg      MCHC 37.1 (H) g/dL      RDW 12.0 %      RDW-SD 38.5 fl      MPV 10.4 fL      Platelets 341 10*3/mm3      Neutrophil % 87.2 (H) %      Lymphocyte % 9.6 (L) %      Monocyte % 2.7 %      Eosinophil % 0.0 %      Basophil % 0.0 %      Immature Grans % 0.5 %      Neutrophils, Absolute 19.71 (H) 10*3/mm3      Lymphocytes, Absolute 2.18 10*3/mm3      Monocytes, Absolute 0.61 10*3/mm3      Eosinophils, Absolute 0.00 10*3/mm3      Basophils, Absolute 0.01 10*3/mm3      Immature Grans, Absolute 0.11 (H) 10*3/mm3      nRBC 0.0 /100 WBC     Comprehensive Metabolic Panel [[id number]]  (Abnormal) Collected:  10/27/18 1623    Specimen:  Blood Updated:  10/27/18 1654     Glucose 145 (H) mg/dL      BUN 14 mg/dL      Creatinine 0.63 mg/dL      Sodium 130 (L) mmol/L      Potassium 3.8 mmol/L      Chloride 93 (L) mmol/L      CO2 22.0 mmol/L      Calcium 9.7 mg/dL      Total Protein 7.9 g/dL      Albumin 4.60 g/dL      ALT (SGPT) 17 U/L      AST (SGOT) 30 U/L      Alkaline Phosphatase 62 U/L      Total Bilirubin 0.9 mg/dL      eGFR Non African Amer 90 mL/min/1.73      Globulin 3.3 gm/dL      A/G Ratio 1.4 g/dL      BUN/Creatinine Ratio 22.2     Anion Gap 15.0 mmol/L     Narrative:       The MDRD GFR formula is only valid for adults with stable renal function between ages 18 and 70.        Imaging Results (most recent)     Procedure Component Value Units Date/Time    XR Chest PA & Lateral [[id number]] Collected:  10/27/18 1739     Updated:  10/27/18 1751    Narrative:         EXAM:          Radiograph(s), Chest   VIEWS:    PA / Lat ; 2       DATE/TIME:   10/27/2018 5:49 PM CDT                INDICATION:   chest pain    COMPARISON:  CXR: None available at the time of reporting             FINDINGS:             - lines/tubes:    none     - cardiac:         size within normal limits         - mediastinum: contour within normal limits         - lungs:         no focal air space process, pulmonary  interstitial edema, nodule(s)/mass             - pleura:         no evidence of  fluid                  - osseous:         Status post median sternotomy for CABG                   - misc.:         Impression:       CONCLUSION:        1. No evidence of an active cardiopulmonary process.                                                Electronically signed by:  AILEEN Booth MD  10/27/2018 5:50  PM CDT Workstation: [id number]    CT Abdomen Pelvis Without Contrast [[id number]] Collected:  10/27/18 1633     Updated:  10/27/18 1707    Narrative:         EXAMINATION:  Computed Tomography           REGION:    Abdomen / Pelvis                   INDICATION:   abdominal pain    HISTORY:  DEREK. IMAGING:    none          TECHNIQUE:      - reconstructions:    axial, coronal, sagittal      - contrast:      oral:  no ;   intravenous:  no     - Please note:        - Lack of IV contrast limits assessment of solid organ  parenchyma, urinary system, or vascular structures.        - Lack of oral contrast limits assessment of GI tract  structures and peritoneal disease.          This exam was performed according to the departmental  dose-optimization program which includes automated exposure  control, adjustment of the mA and/or kV according to patient size  and/or use of iterative reconstruction technique.                COMMENTS:    THORAX (INFERIOR):  The lung bases are clear.     The pleura is without fluid or a mass.     The heart size is normal size and there is no pericardial fluid.         ABDOMEN:    Limited assessment of the solid organ parenchyma is grossly  unremarkable demonstrating  no evidence of organomegaly.   Cholelithiasis without gross evidence of wall thickening or  pericholecystic fluid.  Limited assessment of the viscera is grossly unremarkable  demonstrating normal caliber bowel loops.    No evidence of free fluid or free intraperitoneal air.     The osseous structures are grossly unremarkable for age.    RETROPERITONEUM:  Limited assessment of the kidneys demonstrates overall normal  size.     Limited assessment of the ureters demonstrates normal caliber and  course.    The right adrenal gland displays nodular enlargement measuring  1.7 x 1.5 cm and Hounsfield units of 4, consistent with benign  adenoma (axial 20/91).  No gross evidence of significant retroperitoneal adenopathy.    The infrarenal abdominal aorta displays high-grade luminal  narrowing due to the presence of calcified atherosclerotic  changes.      PELVIS:    Limited assessment of the viscera is grossly unremarkable  demonstrating normal caliber bowel loops.       No evidence of free fluid or free intraperitoneal air.      The osseous structures are grossly unremarkable for age.     Status post bipolar left hip replacement.  The vascular structures are grossly within normal limits for age.            .          Impression:       CONCLUSION:     1. Limited examination due to the lack of intravenous and oral  contrast.        2. No evidence of an acute intra-abdominal/pelvic process.  3. Additional incidental findings as above.                                               Electronically signed by:  AILEEN Booth MD  10/27/2018 5:06  PM CDT Workstation: [id number]          Chief Complaint on Day of Discharge:  Fatigue    Hospital Course:  The patient is a 83 y.o. female who presented to [facility] with nausea and vomiting.  During initial evaluation she was found to screen positive for sepsis.  She was started on broad spectrum antibiotics after cultures were obtained.  Patient also had markedly  "elevated blood pressure and was given IV medications to treat her blood pressure.  Her cultures remained negative and she was much better clinically.  She was transitioned to oral antibiotics to finish her course.  Patient stated that her blood pressure was chronically extremely difficult to control.  Her blood pressure returned to her baseline.  PT/OT were consulted.  Patient stated that she had help at home 24/7 and wanted to return home.  Home health was arranged.  She was discharged home in baseline condition.    Condition on Discharge:  stable    Physical Exam on Discharge:  /78 (BP Location: Left arm, Patient Position: Lying)   Pulse 62   Temp 98.3 °F (36.8 °C) (Oral)   Resp 18   Ht 157.5 cm (62\")   Wt 55.3 kg (122 lb)   SpO2 95%   BMI 22.31 kg/m²      Physical Exam  Constitutional: She appears well-developed and well-nourished.   HENT:   Head: Normocephalic and atraumatic.   Eyes: Pupils are equal, round, and reactive to light. EOM are normal.   Neck: Normal range of motion. Neck supple.   Cardiovascular: Normal rate, regular rhythm and normal heart sounds.  Exam reveals no gallop and no friction rub.    No murmur heard.  Pulmonary/Chest: Effort normal. No respiratory distress. She has decreased breath sounds. She has no wheezes. She has no rales. She exhibits no tenderness.   Abdominal: Soft. Bowel sounds are normal. She exhibits no distension. There is no tenderness. There is no guarding.   Musculoskeletal: She exhibits no edema.   Skin: Skin is warm and dry.   Psychiatric: She has a normal mood and affect. Her behavior is normal. Thought content normal.   Vitals reviewed.    Discharge Disposition:  Home-Health Care [facility]    Discharge Medications:     Discharge Medications      Changes to Medications      Instructions Start Date   valsartan 160 MG tablet  Commonly known as:  DIOVAN  What changed:  when to take this   160 mg, Oral, Every 24 Hours Scheduled         Continue These Medications      " Instructions Start Date   aspirin 81 MG tablet   81 mg, Oral, Daily      CloNIDine 0.2 MG tablet  Commonly known as:  CATAPRES   TAKE 1 TABLET BY MOUTH TWICE A DAY      clopidogrel 75 MG tablet  Commonly known as:  PLAVIX   75 mg, Oral, Daily      hydrALAZINE 25 MG tablet  Commonly known as:  APRESOLINE   25 mg, Oral, 3 Times Daily      isosorbide mononitrate 30 MG 24 hr tablet  Commonly known as:  IMDUR   30 mg, Oral, Daily      Loratadine 10 MG capsule   10 mg, Oral, Daily PRN, Allergy relief       metFORMIN 500 MG tablet  Commonly known as:  GLUCOPHAGE   500 mg, Oral, 2 Times Daily With Meals      METOPROLOL TARTRATE PO   50 mg, Oral, 2 Times Daily      Omega-3 Fish Oil 1000 MG capsule   1,000 mg, Oral, Daily      sodium chloride 1 g tablet   1 table by mouth 4 times a week      vitamin B-12 1000 MCG tablet  Commonly known as:  CYANOCOBALAMIN   1,000 mcg, Sublingual, Daily             Discharge Diet:   Diet Instructions     Advance Diet As Tolerated             Activity at Discharge:   Activity Instructions     Activity as Tolerated           Follow-up Appointments:   Future Appointments  Date Time Provider Department Center   3/26/2019 2:30 PM Hamman, Jack L, MD [facility] None       Test Results Pending at Discharge:  Order Current Status    Blood Culture - Blood, Preliminary result    Blood Culture - Blood, Preliminary result              This document has been electronically signed by Bayron Penn MD on November 1, 2018 2:06 PM      Time: 35 min

## 2018-11-01 NOTE — PLAN OF CARE
Problem: Patient Care Overview  Goal: Plan of Care Review  Outcome: Ongoing (interventions implemented as appropriate)   11/01/18 0338   Coping/Psychosocial   Plan of Care Reviewed With patient   Plan of Care Review   Progress no change   OTHER   Outcome Summary blood pressure hard to control. pain controlled. continue to monitor.       Problem: Fall Risk (Adult)  Goal: Absence of Fall  Outcome: Ongoing (interventions implemented as appropriate)      Problem: Skin Injury Risk (Adult)  Goal: Skin Health and Integrity  Outcome: Ongoing (interventions implemented as appropriate)      Problem: Pain, Acute (Adult)  Goal: Acceptable Pain Control/Comfort Level  Outcome: Ongoing (interventions implemented as appropriate)      Problem: Sepsis/Septic Shock (Adult)  Goal: Signs and Symptoms of Listed Potential Problems Will be Absent, Minimized or Managed (Sepsis/Septic Shock)  Outcome: Ongoing (interventions implemented as appropriate)

## 2018-11-01 NOTE — THERAPY DISCHARGE NOTE
Acute Care - Physical Therapy Discharge Summary  [facility]       Patient Name: Minerva Madrid  : 1935  MRN: [id number]    Today's Date: 2018  Onset of Illness/Injury or Date of Surgery: 10/27/18    Date of Referral to PT: 10/29/18  Referring Physician: Dr. Simon      Admit Date: 10/27/2018      PT Recommendation and Plan    Visit Dx:    ICD-10-CM ICD-9-CM   1. Lower abdominal pain R10.30 789.09   2. Sepsis, due to unspecified organism (CMS/[facility]) A41.9 038.9     995.91   3. Impaired functional mobility, balance, gait, and endurance Z74.09 V49.89   4. Essential hypertension, benign I10 401.1   5. Elevated troponin R74.8 790.6   6. Atherosclerosis of native coronary artery of native heart, angina presence unspecified I25.10 414.01   7. 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 Impaired mobility and ADLs Z74.09 799.89             Outcome Measures     Row Name 18 0930 18 0805 10/31/18 1325       How much help from another person do you currently need...    Turning from your back to your side while in flat bed without using bedrails?  --  -- 4  -TW    Moving from lying on back to sitting on the side of a flat bed without bedrails?  --  -- 3  -TW    Moving to and from a bed to a chair (including a wheelchair)?  --  -- 3  -TW    Standing up from a chair using your arms (e.g., wheelchair, bedside chair)?  --  -- 3  -TW    Climbing 3-5 steps with a railing?  --  -- 3  -TW    To walk in hospital room?  --  -- 3  -TW    AM-PAC 6 Clicks Score  --  -- 19  -TW       How much help from another is currently needed...    Putting on and taking off regular lower body clothing? 2  -KD 2  -KD  --    Bathing (including washing, rinsing, and drying) 2  -KD 2  -KD  --    Toileting (which includes using toilet bed pan or urinal) 2  -KD 2  -KD  --    Putting on and taking off regular upper body clothing 3  -KD 3  -KD  --    Taking care  of personal grooming (such as brushing teeth) 3  -KD 3  -KD  --    Eating meals 3  -KD 3  -KD  --    Score 15  -KD 15  -KD  --       Functional Assessment    Outcome Measure Options  --  -- AM-PAC 6 Clicks Basic Mobility (PT)  -    Row Name 10/31/18 0920 10/30/18 1047 10/30/18 0845       How much help from another person do you currently need...    Turning from your back to your side while in flat bed without using bedrails?  --  -- 4  -JA    Moving from lying on back to sitting on the side of a flat bed without bedrails?  --  -- 3  -JA    Moving to and from a bed to a chair (including a wheelchair)?  --  -- 3  -JA    Standing up from a chair using your arms (e.g., wheelchair, bedside chair)?  --  -- 3  -JA    Climbing 3-5 steps with a railing?  --  -- 3  -JA    To walk in hospital room?  --  -- 3  -JA    AM-PAC 6 Clicks Score  --  -- 19  -JA       How much help from another is currently needed...    Putting on and taking off regular lower body clothing? 2  -KD 2  -BH  --    Bathing (including washing, rinsing, and drying) 2  -KD 2  -BH  --    Toileting (which includes using toilet bed pan or urinal) 2  -KD 2  -BH  --    Putting on and taking off regular upper body clothing 3  -KD 3  -BH  --    Taking care of personal grooming (such as brushing teeth) 3  -KD 3  -BH  --    Eating meals 3  -KD 3  -BH  --    Score 15  -KD 15  -BH  --       Functional Assessment    Outcome Measure Options  -- AM-PAC 6 Clicks Daily Activity (OT)  - AM-PAC 6 Clicks Basic Mobility (PT)  -      User Key  (r) = Recorded By, (t) = Taken By, (c) = Cosigned By    Initials Name Provider Type     Anastacia Alexander, OTR/L Occupational Therapist    Gurmeet Dinh, PTA Physical Therapy Assistant    TW Jaron Penn, PTA Physical Therapy Assistant    KD Christine Fernandez, MEJIA/L Occupational Therapy Assistant            Therapy Suggested Charges     Code   Minutes Charges    52706 (CPT®) Hc Pt Neuromusc Re Education Ea 15 Min       33238 (CPT®) Hc Pt Ther Proc Ea 15 Min      73206 (CPT®) Hc Gait Training Ea 15 Min      95718 (CPT®) Hc Pt Therapeutic Act Ea 15 Min 35 2    45339 (CPT®) Hc Pt Manual Therapy Ea 15 Min      86747 (CPT®) Hc Pt Iontophoresis Ea 15 Min      00016 (CPT®) Hc Pt Elec Stim Ea-Per 15 Min      53193 (CPT®) Hc Pt Ultrasound Ea 15 Min      31988 (CPT®) Hc Pt Self Care/Mgmt/Train Ea 15 Min      99439 (CPT®) Hc Pt Prosthetic (S) Train Initial Encounter, Each 15 Min      01412 (CPT®) Hc Pt Orthotic(S)/Prosthetic(S) Encounter, Each 15 Min      28059 (CPT®) Hc Orthotic(S) Mgmt/Train Initial Encounter, Each 15min      Total  35 2                  PT Discharge Summary  Anticipated Discharge Disposition (PT): home with 24/7 care, home with home health  Reason for Discharge: Discharge from facility, Per MD order  Outcomes Achieved: Unable to make functional progress toward goals at this time  Discharge Destination: Home with home health      Marcy Flannery, PT   11/1/2018

## 2019-03-25 DIAGNOSIS — I73.9 PVD (PERIPHERAL VASCULAR DISEASE) (HCC): Primary | ICD-10-CM

## 2019-03-25 DIAGNOSIS — I65.23 BILATERAL CAROTID ARTERY STENOSIS: ICD-10-CM

## 2019-03-26 ENCOUNTER — OFFICE VISIT (OUTPATIENT)
Dept: CARDIAC SURGERY | Facility: CLINIC | Age: 84
End: 2019-03-26

## 2019-03-26 VITALS
HEART RATE: 62 BPM | TEMPERATURE: 98 F | BODY MASS INDEX: 19.88 KG/M2 | WEIGHT: 108 LBS | DIASTOLIC BLOOD PRESSURE: 60 MMHG | SYSTOLIC BLOOD PRESSURE: 122 MMHG | HEIGHT: 62 IN | OXYGEN SATURATION: 98 %

## 2019-03-26 DIAGNOSIS — I73.9 PVD (PERIPHERAL VASCULAR DISEASE) (HCC): Chronic | ICD-10-CM

## 2019-03-26 DIAGNOSIS — Z72.0 TOBACCO USE: Chronic | ICD-10-CM

## 2019-03-26 DIAGNOSIS — I65.22 ASYMPTOMATIC STENOSIS OF LEFT CAROTID ARTERY: Primary | ICD-10-CM

## 2019-03-26 PROCEDURE — 99213 OFFICE O/P EST LOW 20 MIN: CPT | Performed by: THORACIC SURGERY (CARDIOTHORACIC VASCULAR SURGERY)

## 2019-03-27 NOTE — PROGRESS NOTES
Minerva Madrid  1935            83 y.o.      3/26/2019    Chief Complaint   Patient presents with   • Carotid Artery Disease     1 year follow up    • Peripheral Vascular Disease         1 year follow up    • Peripheral Vascular Disease    PCP ROSAURA CARRASCO DO  CARDIOLOGY SARAH BLUM MD  83-year-old woman has been followed for diabetes, vascular disease since 2012.  She remains an adult lifetime inveterate  cigarette smoker,and she continues to smoke despite our protestations     HPI  Slightly shorter distance claudication since last  visit        ROS   Hypertension, dyslipidemia, tobacco abuse  SURGICAL HISTORY:  Mastoid surgery years prior  3-27-08 CATARACT LEFT  4-29-09 CARDIAC CATHETERIZATION  6-4-09 RIGHT CAROTID ENDARTERECTOMY PATCH ANGIOPLASTY  7-13-09 CABG  HARRELL TO LAD, VEIN GRAFT POSTERIOR DESCENDING   HEMIARTHROPLASTY LEFT H      Current Outpatient Medications:   •  aspirin 81 MG tablet, Take 81 mg by mouth Daily., Disp: , Rfl:   •  CloNIDine (CATAPRES) 0.2 MG tablet, TAKE 1 TABLET BY MOUTH TWICE A DAY, Disp: , Rfl: 0  •  clopidogrel (PLAVIX) 75 MG tablet, Take 75 mg by mouth Daily., Disp: , Rfl:   •  hydrALAZINE (APRESOLINE) 25 MG tablet, Take 25 mg by mouth 3 (Three) Times a Day., Disp: , Rfl:   •  isosorbide mononitrate (IMDUR) 30 MG 24 hr tablet, Take 30 mg by mouth Daily., Disp: , Rfl:   •  Loratadine 10 MG capsule, Take 10 mg by mouth Daily As Needed. Allergy relief, Disp: , Rfl:   •  metFORMIN (GLUCOPHAGE) 500 MG tablet, Take 500 mg by mouth 2 (Two) Times a Day With Meals., Disp: , Rfl:   •  METOPROLOL TARTRATE PO, Take 50 mg by mouth 2 (Two) Times a Day., Disp: , Rfl:   •  Omega-3 Fatty Acids (OMEGA-3 FISH OIL) 1000 MG capsule, Take 1,000 mg by mouth Daily., Disp: , Rfl:   •  sodium chloride 1 g tablet, 1 table by mouth 4 times a week, Disp: , Rfl: 2  •  valsartan (DIOVAN) 160 MG tablet, Take 1 tablet by mouth Daily. (Patient taking differently: Take 160 mg by mouth 2 (Two) Times a Day.),  "Disp: 30 tablet, Rfl: 0  •  vitamin B-12 (CYANOCOBALAMIN) 1000 MCG tablet, Place 1,000 mcg under the tongue Daily., Disp: , Rfl:     The following portions of the patient's history were reviewed and updated as appropriate: allergies, current medications, past family history, past medical history, past social history, past surgical history and problem list.        Past Surgical History:   Procedure Laterality Date   • CARDIAC SURGERY     • CAROTID ENDARTERECTOMY     • JOINT REPLACEMENT Left 01/2017    partial hip replacement   • MASTOID SURGERY             Physical Exam  /60 (BP Location: Left arm)   Pulse 62   Temp 98 °F (36.7 °C) (Temporal)   Ht 157.5 cm (62\")   Wt 49 kg (108 lb)   SpO2 98%   BMI 19.75 kg/m²     HEENT: No masses soft bruit over left carotid artery    CHEST: Wheezes at the apex    HEART: Regular rate and rhythm    ABDOMEN: Nontender    EXTREMITIES: Pulses difficult to palpate at the ankles, pulses palpable at the inguinal area bilaterally    VASCULAR LAB STUDIES:  NON INVASIVE VASCULAR LABORATORY STUDIES:                    CAROTID DUPLEX SCAN (3-20-18)          RIGHT  0-49%    LEFT 60-69%              VERTEBRAL FLOW                        Antegrade          Antegrade     IDALIA (3-20-18)                      RIGHT  0.88            LEFT 0.85   WAVE FORMS         PT    Triphasic                 Triphasic                                    DP     Triphasic                 Triphasic    NON INVASIVE VASCULAR LABORATORY STUDIES (3-26-19)                CAROTID DUPLEX SCAN (3-26-19)  RIGHT 0-49%   LEFT 50-69%               Vertebral Flow                         Antegrade        Antegrade    IDALIA (3-26-19)                        RIGHT 0.55           LEFT 0.62   WAVE FORMS    CF           Biphasic                Biphasic                             POP           Biphasic                Biphasic                               PT            Biphasic                Biphasic                               " DP            Biphasic               Biphasic  Diminished arterial perfusion both lower extremities which appears to have developed at the aorfta-iliac levels bilaterally. After discussion patient declined CTA at this time but will       Return 3 months for IDALIA and potential CTA leading to angiography and possible iliac stents    RADIOLOGY:      Asymptomatic stenosis of left carotid artery [I65.22]    IMPRESSION:  1.  Diminishing arterial perfusion both lower extremities probably at the aortoiliac level  2.  Continued moderate stenosis left internal carotid artery, asymptomatic  3.  Continued tobacco abuse                Assessment/Plan  Minerva was seen today for carotid artery disease and peripheral vascular disease.    Diagnoses and all orders for this visit:    Asymptomatic stenosis of left carotid artery    PVD (peripheral vascular disease) (CMS/[facility])    Tobacco use                  Return in about 3 months (around 6/26/2019) for IDALIA CBC CM.            Jack L. Hamman, MD  3/26/2019

## 2019-04-03 LAB
BH CV LOWER ARTERIAL LEFT ABI RATIO: 0.62
BH CV LOWER ARTERIAL LEFT DORSALIS PEDIS SYS MAX: 83 MMHG
BH CV LOWER ARTERIAL LEFT POST TIBIAL SYS MAX: 88 MMHG
BH CV LOWER ARTERIAL RIGHT ABI RATIO: 0.55
BH CV LOWER ARTERIAL RIGHT DORSALIS PEDIS SYS MAX: 75 MMHG
BH CV LOWER ARTERIAL RIGHT POST TIBIAL SYS MAX: 78 MMHG
BH CV XLRA MEAS CAROTID RIGHT ICA/CCA DIASTOLIC RATIO: 0.7
BH CV XLRA MEAS LEFT DIST CCA EDV: 12 CM/SEC
BH CV XLRA MEAS LEFT DIST CCA PSV: 96 CM/SEC
BH CV XLRA MEAS LEFT DIST ICA EDV: 23 CM/SEC
BH CV XLRA MEAS LEFT DIST ICA PSV: 107 CM/SEC
BH CV XLRA MEAS LEFT ICA/CCA DIASTOLIC RATIO: 3.2
BH CV XLRA MEAS LEFT ICA/CCA RATIO: 1.9
BH CV XLRA MEAS LEFT MID ICA EDV: 34 CM/SEC
BH CV XLRA MEAS LEFT MID ICA PSV: 169 CM/SEC
BH CV XLRA MEAS LEFT PROX CCA EDV: 13 CM/SEC
BH CV XLRA MEAS LEFT PROX CCA PSV: 87 CM/SEC
BH CV XLRA MEAS LEFT PROX ECA EDV: 0 CM/SEC
BH CV XLRA MEAS LEFT PROX ECA PSV: 282 CM/SEC
BH CV XLRA MEAS LEFT PROX ICA EDV: 41 CM/SEC
BH CV XLRA MEAS LEFT PROX ICA PSV: 183 CM/SEC
BH CV XLRA MEAS LEFT VERTEBRAL A EDV: 26 CM/SEC
BH CV XLRA MEAS LEFT VERTEBRAL A PSV: 86 CM/SEC
BH CV XLRA MEAS RIGHT DIST CCA EDV: 15 CM/SEC
BH CV XLRA MEAS RIGHT DIST CCA PSV: 122 CM/SEC
BH CV XLRA MEAS RIGHT DIST ICA EDV: 22 CM/SEC
BH CV XLRA MEAS RIGHT DIST ICA PSV: 100 CM/SEC
BH CV XLRA MEAS RIGHT ICA/CCA RATIO: 0.8
BH CV XLRA MEAS RIGHT MID ICA EDV: 11 CM/SEC
BH CV XLRA MEAS RIGHT MID ICA PSV: 70 CM/SEC
BH CV XLRA MEAS RIGHT PROX CCA EDV: 13 CM/SEC
BH CV XLRA MEAS RIGHT PROX CCA PSV: 136 CM/SEC
BH CV XLRA MEAS RIGHT PROX ECA EDV: 0 CM/SEC
BH CV XLRA MEAS RIGHT PROX ECA PSV: 178 CM/SEC
BH CV XLRA MEAS RIGHT PROX ICA EDV: 9 CM/SEC
BH CV XLRA MEAS RIGHT PROX ICA PSV: 111 CM/SEC
BH CV XLRA MEAS RIGHT VERTEBRAL A EDV: 5 CM/SEC
BH CV XLRA MEAS RIGHT VERTEBRAL A PSV: 36 CM/SEC
UPPER ARTERIAL LEFT ARM BRACHIAL SYS MAX: 141 MMHG
UPPER ARTERIAL RIGHT ARM BRACHIAL SYS MAX: 133 MMHG

## 2019-06-25 DIAGNOSIS — I10 ESSENTIAL HYPERTENSION: Chronic | ICD-10-CM

## 2019-06-25 DIAGNOSIS — I73.9 PVD (PERIPHERAL VASCULAR DISEASE) (HCC): Primary | Chronic | ICD-10-CM

## 2019-06-26 ENCOUNTER — OFFICE VISIT (OUTPATIENT)
Dept: CARDIAC SURGERY | Facility: CLINIC | Age: 84
End: 2019-06-26

## 2019-06-26 ENCOUNTER — LAB (OUTPATIENT)
Dept: LAB | Facility: HOSPITAL | Age: 84
End: 2019-06-26

## 2019-06-26 VITALS
BODY MASS INDEX: 20.2 KG/M2 | DIASTOLIC BLOOD PRESSURE: 75 MMHG | WEIGHT: 114 LBS | SYSTOLIC BLOOD PRESSURE: 138 MMHG | OXYGEN SATURATION: 98 % | TEMPERATURE: 97.8 F | HEART RATE: 51 BPM | HEIGHT: 63 IN

## 2019-06-26 DIAGNOSIS — I10 ESSENTIAL HYPERTENSION: ICD-10-CM

## 2019-06-26 DIAGNOSIS — Z88.8 ALLERGY TO IODINE: ICD-10-CM

## 2019-06-26 DIAGNOSIS — R09.89 DECREASED PULSES IN FEET: ICD-10-CM

## 2019-06-26 DIAGNOSIS — I73.9 PVD (PERIPHERAL VASCULAR DISEASE) (HCC): Primary | Chronic | ICD-10-CM

## 2019-06-26 DIAGNOSIS — I73.9 PVD (PERIPHERAL VASCULAR DISEASE) (HCC): ICD-10-CM

## 2019-06-26 PROBLEM — I15.1 HYPERTENSION SECONDARY TO OTHER RENAL DISORDERS (CODE): Status: ACTIVE | Noted: 2019-06-26

## 2019-06-26 LAB
ALBUMIN SERPL-MCNC: 4.3 G/DL (ref 3.5–5.2)
ALBUMIN/GLOB SERPL: 1.7 G/DL
ALP SERPL-CCNC: 49 U/L (ref 39–117)
ALT SERPL W P-5'-P-CCNC: 7 U/L (ref 1–33)
ANION GAP SERPL CALCULATED.3IONS-SCNC: 14 MMOL/L (ref 5–15)
AST SERPL-CCNC: 11 U/L (ref 1–32)
BASOPHILS # BLD AUTO: 0.04 10*3/MM3 (ref 0–0.2)
BASOPHILS NFR BLD AUTO: 0.4 % (ref 0–1.5)
BILIRUB SERPL-MCNC: 0.3 MG/DL (ref 0.2–1.2)
BUN BLD-MCNC: 13 MG/DL (ref 8–23)
BUN/CREAT SERPL: 18.8 (ref 7–25)
CALCIUM SPEC-SCNC: 9.8 MG/DL (ref 8.6–10.5)
CHLORIDE SERPL-SCNC: 98 MMOL/L (ref 98–107)
CO2 SERPL-SCNC: 25 MMOL/L (ref 22–29)
CREAT BLD-MCNC: 0.69 MG/DL (ref 0.57–1)
DEPRECATED RDW RBC AUTO: 38.8 FL (ref 37–54)
EOSINOPHIL # BLD AUTO: 0.23 10*3/MM3 (ref 0–0.4)
EOSINOPHIL NFR BLD AUTO: 2.5 % (ref 0.3–6.2)
ERYTHROCYTE [DISTWIDTH] IN BLOOD BY AUTOMATED COUNT: 11.9 % (ref 12.3–15.4)
GFR SERPL CREATININE-BSD FRML MDRD: 81 ML/MIN/1.73
GLOBULIN UR ELPH-MCNC: 2.5 GM/DL
GLUCOSE BLD-MCNC: 136 MG/DL (ref 65–99)
HCT VFR BLD AUTO: 40.8 % (ref 34–46.6)
HGB BLD-MCNC: 14.6 G/DL (ref 12–15.9)
IMM GRANULOCYTES # BLD AUTO: 0.05 10*3/MM3 (ref 0–0.05)
IMM GRANULOCYTES NFR BLD AUTO: 0.5 % (ref 0–0.5)
LYMPHOCYTES # BLD AUTO: 1.81 10*3/MM3 (ref 0.7–3.1)
LYMPHOCYTES NFR BLD AUTO: 19.8 % (ref 19.6–45.3)
MCH RBC QN AUTO: 32.2 PG (ref 26.6–33)
MCHC RBC AUTO-ENTMCNC: 35.8 G/DL (ref 31.5–35.7)
MCV RBC AUTO: 89.9 FL (ref 79–97)
MONOCYTES # BLD AUTO: 0.77 10*3/MM3 (ref 0.1–0.9)
MONOCYTES NFR BLD AUTO: 8.4 % (ref 5–12)
NEUTROPHILS # BLD AUTO: 6.26 10*3/MM3 (ref 1.7–7)
NEUTROPHILS NFR BLD AUTO: 68.4 % (ref 42.7–76)
NRBC BLD AUTO-RTO: 0 /100 WBC (ref 0–0.2)
PLATELET # BLD AUTO: 302 10*3/MM3 (ref 140–450)
PMV BLD AUTO: 9.9 FL (ref 6–12)
POTASSIUM BLD-SCNC: 3.9 MMOL/L (ref 3.5–5.2)
PROT SERPL-MCNC: 6.8 G/DL (ref 6–8.5)
RBC # BLD AUTO: 4.54 10*6/MM3 (ref 3.77–5.28)
SODIUM BLD-SCNC: 137 MMOL/L (ref 136–145)
WBC NRBC COR # BLD: 9.16 10*3/MM3 (ref 3.4–10.8)

## 2019-06-26 PROCEDURE — 80053 COMPREHEN METABOLIC PANEL: CPT

## 2019-06-26 PROCEDURE — 99213 OFFICE O/P EST LOW 20 MIN: CPT | Performed by: THORACIC SURGERY (CARDIOTHORACIC VASCULAR SURGERY)

## 2019-06-26 PROCEDURE — 85025 COMPLETE CBC W/AUTO DIFF WBC: CPT

## 2019-06-26 PROCEDURE — 36415 COLL VENOUS BLD VENIPUNCTURE: CPT

## 2019-06-26 RX ORDER — PREDNISONE 50 MG/1
50 TABLET ORAL DAILY
Qty: 3 TABLET | Refills: 0 | Status: SHIPPED | OUTPATIENT
Start: 2019-06-26 | End: 2019-10-21

## 2019-06-26 RX ORDER — DIPHENHYDRAMINE HCL 50 MG
50 CAPSULE ORAL DAILY
Qty: 1 CAPSULE | Refills: 0 | Status: SHIPPED | OUTPATIENT
Start: 2019-07-26 | End: 2019-07-27

## 2019-06-27 NOTE — PROGRESS NOTES
Minerva Madrid  1935            84 y.o.      6/26/2019    Chief Complaint   Patient presents with   • Peripheral Vascular Disease     3 month follow up       Peripheral Vascular Disease    PCP ROSAURA CARRASCO DO  CARDIOLOGY SARAH BLUM MD  84-year-old woman returns today to reconsider CTA leading to consideration for intervention such as angiography and stenting to see if we can improve her lower extremity arterial perfusion    83-year-old woman has been followed for diabetes, vascular disease since 2012.  She remains an adult lifetime inveterate  cigarette smoker,and she continues to smoke despite our protestations     HPI  Slightly shorter distance claudication since last  visit        ROS   Hypertension, dyslipidemia, tobacco abuse  SURGICAL HISTORY:  Mastoid surgery years prior  3-27-08 CATARACT LEFT  4-29-09 CARDIAC CATHETERIZATION  6-4-09 RIGHT CAROTID ENDARTERECTOMY PATCH ANGIOPLASTY  7-13-09 CABG  HARRELL TO LAD, VEIN GRAFT POSTERIOR DESCENDING   HEMIARTHROPLASTY LEFT H           Current Outpatient Medications:   •  aspirin 81 MG tablet, Take 81 mg by mouth Daily., Disp: , Rfl:   •  CloNIDine (CATAPRES) 0.2 MG tablet, TAKE 1 TABLET BY MOUTH TWICE A DAY, Disp: , Rfl: 0  •  clopidogrel (PLAVIX) 75 MG tablet, Take 75 mg by mouth Daily., Disp: , Rfl:   •  hydrALAZINE (APRESOLINE) 25 MG tablet, Take 25 mg by mouth 3 (Three) Times a Day., Disp: , Rfl:   •  isosorbide mononitrate (IMDUR) 30 MG 24 hr tablet, Take 30 mg by mouth Daily., Disp: , Rfl:   •  Loratadine 10 MG capsule, Take 10 mg by mouth Daily As Needed. Allergy relief, Disp: , Rfl:   •  metFORMIN (GLUCOPHAGE) 500 MG tablet, Take 500 mg by mouth 2 (Two) Times a Day With Meals., Disp: , Rfl:   •  METOPROLOL TARTRATE PO, Take 50 mg by mouth 2 (Two) Times a Day., Disp: , Rfl:   •  Omega-3 Fatty Acids (OMEGA-3 FISH OIL) 1000 MG capsule, Take 1,000 mg by mouth Daily., Disp: , Rfl:   •  sodium chloride 1 g tablet, 1 table by mouth 4 times a week, Disp: ,  "Rfl: 2  •  valsartan (DIOVAN) 160 MG tablet, Take 1 tablet by mouth Daily. (Patient taking differently: Take 160 mg by mouth 2 (Two) Times a Day.), Disp: 30 tablet, Rfl: 0  •  vitamin B-12 (CYANOCOBALAMIN) 1000 MCG tablet, Place 1,000 mcg under the tongue Daily., Disp: , Rfl:   •  [START ON 7/26/2019] diphenhydrAMINE (BENADRYL) 50 MG capsule, Take 1 capsule by mouth Daily for 1 day. 1 dose by mouth  7/3/2019 @ 8:30a, Disp: 1 capsule, Rfl: 0  •  predniSONE (DELTASONE) 50 MG tablet, Take 1 tablet by mouth Daily. 1 dose by mouth 7/2/2019 at bedtime, 1 dose by mouth  7/3/2019 @ 5:00a, 1 dose by mouth  7/3/2019 @ 8:30a, Disp: 3 tablet, Rfl: 0    The following portions of the patient's history were reviewed and updated as appropriate: allergies, current medications, past family history, past medical history, past social history, past surgical history and problem list.        Past Surgical History:   Procedure Laterality Date   • CARDIAC SURGERY     • CAROTID ENDARTERECTOMY     • JOINT REPLACEMENT Left 01/2017    partial hip replacement   • MASTOID SURGERY             Physical Exam  /75 (BP Location: Right arm)   Pulse 51   Temp 97.8 °F (36.6 °C) (Temporal)   Ht 160 cm (63\")   Wt 51.7 kg (114 lb)   SpO2 98%   BMI 20.19 kg/m²     HEENT:    CHEST:    HEART:    ABDOMEN:    EXTREMITIES: Femoral pulses present ankle pulses absent    VASCULAR LAB STUDIES:  NON INVASIVE VASCULAR LABORATORY STUDIES:     CAROTID DUPLEX SCAN (3-20-18)          RIGHT  0-49%    LEFT 60-69%              VERTEBRAL FLOW                        Antegrade          Antegrade     IDALIA (3-20-18)                      RIGHT  0.88            LEFT 0.85   WAVE FORMS         PT    Triphasic                 Triphasic                                    DP     Triphasic                 Triphasic     NON INVASIVE VASCULAR LABORATORY STUDIES (3-26-19)                CAROTID DUPLEX SCAN (3-26-19)  RIGHT 0-49%   LEFT 50-69%               Vertebral Flow           "               Antegrade        Antegrade     IDALIA (3-26-19)                        RIGHT 0.55           LEFT 0.62   WAVE FORMS    CF           Biphasic                Biphasic                             POP           Biphasic                Biphasic                               PT            Biphasic                Biphasic                               DP            Biphasic                Biphasic  Diminished arterial perfusion both lower extremities which appears to have developed at the aorfta-iliac levels bilaterally. After discussion patient declined CTA at this time but will       Return 3 months for IDALIA and potential CTA leading to angiography and possible iliac stents       IDALIA (6-26-19)                  RIGHT 0.49           LEFT 0.75  WAVE FORMS    CF      Triphasic              Triphasic                            POP      Biphasic               Biphasic                              PT       Biphasic               Biphasic                              DP       Biphasic               Biphasic             RADIOLOGY:      PVD (peripheral vascular disease) (CMS/HCC) [I73.9]    IMPRESSION:  1.  Intermittent claudication with suspicion that disease may be at the aortoiliac level  2.  Adequate eGFR 81  3.  Contrast intolerance or allergy steroid prep ordered  4.  CTA schedule 7-3-19  5. Return visit 7-10-19  .                 Assessment/Plan  Minerva was seen today for peripheral vascular disease.    Diagnoses and all orders for this visit:    PVD (peripheral vascular disease) (CMS/[facility])  -     CT Angio Abdominal Aorta Bilateral Iliofem Runoff With & Without Contrast; Future    Decreased pulses in feet  -     CT Angio Abdominal Aorta Bilateral Iliofem Runoff With & Without Contrast; Future    Allergy to iodine  -     predniSONE (DELTASONE) 50 MG tablet; Take 1 tablet by mouth Daily. 1 dose by mouth 7/2/2019 at bedtime, 1 dose by mouth  7/3/2019 @ 5:00a, 1 dose by mouth  7/3/2019 @ 8:30a  -     diphenhydrAMINE  (BENADRYL) 50 MG capsule; Take 1 capsule by mouth Daily for 1 day. 1 dose by mouth  7/3/2019 @ 8:30a                  Return in about 2 weeks (around 7/10/2019), or CTA 7-3-19.            Jack L. Hamman, MD  6/26/2019

## 2019-07-03 ENCOUNTER — HOSPITAL ENCOUNTER (OUTPATIENT)
Dept: CT IMAGING | Facility: HOSPITAL | Age: 84
Discharge: HOME | End: 2019-07-03
Admitting: THORACIC SURGERY (CARDIOTHORACIC VASCULAR SURGERY)

## 2019-07-03 DIAGNOSIS — I73.9 PVD (PERIPHERAL VASCULAR DISEASE) (HCC): Chronic | ICD-10-CM

## 2019-07-03 DIAGNOSIS — R09.89 DECREASED PULSES IN FEET: ICD-10-CM

## 2019-07-03 PROCEDURE — 0 IOPAMIDOL PER 1 ML: Performed by: THORACIC SURGERY (CARDIOTHORACIC VASCULAR SURGERY)

## 2019-07-03 PROCEDURE — 75635 CT ANGIO ABDOMINAL ARTERIES: CPT

## 2019-07-03 RX ADMIN — IOPAMIDOL 90 ML: 755 INJECTION, SOLUTION INTRAVENOUS at 10:17

## 2019-07-10 ENCOUNTER — OFFICE VISIT (OUTPATIENT)
Dept: CARDIAC SURGERY | Facility: CLINIC | Age: 84
End: 2019-07-10

## 2019-07-10 VITALS
TEMPERATURE: 97.6 F | HEART RATE: 59 BPM | OXYGEN SATURATION: 98 % | BODY MASS INDEX: 20.2 KG/M2 | WEIGHT: 114 LBS | DIASTOLIC BLOOD PRESSURE: 70 MMHG | HEIGHT: 63 IN | SYSTOLIC BLOOD PRESSURE: 115 MMHG

## 2019-07-10 DIAGNOSIS — I73.9 PVD (PERIPHERAL VASCULAR DISEASE) (HCC): Chronic | ICD-10-CM

## 2019-07-10 DIAGNOSIS — R09.89 DECREASED PULSES IN FEET: Primary | ICD-10-CM

## 2019-07-10 PROCEDURE — 99212 OFFICE O/P EST SF 10 MIN: CPT | Performed by: THORACIC SURGERY (CARDIOTHORACIC VASCULAR SURGERY)

## 2019-07-10 NOTE — PROGRESS NOTES
Minerva Madrid  1935            84 y.o.      7/10/2019    Chief Complaint   Patient presents with   • Carotid Artery Disease     Peripheral Vascular Disease    PCP ROSAURA CARRASCO DO  CARDIOLOGY SARAH BLUM MD  84-year-old woman returns today to reconsider CTA leading to consideration for intervention such as angiography and stenting to see if we can improve her lower extremity arterial perfusion     83-year-old woman has been followed for diabetes, vascular disease since 2012.  She remains an adult lifetime inveterate  cigarette smoker,and she continues to smoke despite our protestations     HPI  Slightly shorter distance claudication since last  visit        ROS   Hypertension, dyslipidemia, tobacco abuse  SURGICAL HISTORY:  Mastoid surgery years prior  3-27-08 CATARACT LEFT  4-29-09 CARDIAC CATHETERIZATION  6-4-09 RIGHT CAROTID ENDARTERECTOMY PATCH ANGIOPLASTY  7-13-09 CABG  HARRELL TO LAD, VEIN GRAFT POSTERIOR DESCENDING   HEMIARTHROPLASTY LEFT H     HPI returns today for CTA results.        ROS       Current Outpatient Medications:   •  aspirin 81 MG tablet, Take 81 mg by mouth Daily., Disp: , Rfl:   •  CloNIDine (CATAPRES) 0.2 MG tablet, TAKE 1 TABLET BY MOUTH TWICE A DAY, Disp: , Rfl: 0  •  clopidogrel (PLAVIX) 75 MG tablet, Take 75 mg by mouth Daily., Disp: , Rfl:   •  [START ON 7/26/2019] diphenhydrAMINE (BENADRYL) 50 MG capsule, Take 1 capsule by mouth Daily for 1 day. 1 dose by mouth  7/3/2019 @ 8:30a, Disp: 1 capsule, Rfl: 0  •  hydrALAZINE (APRESOLINE) 25 MG tablet, Take 25 mg by mouth 3 (Three) Times a Day., Disp: , Rfl:   •  isosorbide mononitrate (IMDUR) 30 MG 24 hr tablet, Take 30 mg by mouth Daily., Disp: , Rfl:   •  Loratadine 10 MG capsule, Take 10 mg by mouth Daily As Needed. Allergy relief, Disp: , Rfl:   •  metFORMIN (GLUCOPHAGE) 500 MG tablet, Take 500 mg by mouth 2 (Two) Times a Day With Meals., Disp: , Rfl:   •  METOPROLOL TARTRATE PO, Take 50 mg by mouth 2 (Two) Times a Day., Disp: ,  "Rfl:   •  Omega-3 Fatty Acids (OMEGA-3 FISH OIL) 1000 MG capsule, Take 1,000 mg by mouth Daily., Disp: , Rfl:   •  predniSONE (DELTASONE) 50 MG tablet, Take 1 tablet by mouth Daily. 1 dose by mouth 7/2/2019 at bedtime, 1 dose by mouth  7/3/2019 @ 5:00a, 1 dose by mouth  7/3/2019 @ 8:30a, Disp: 3 tablet, Rfl: 0  •  sodium chloride 1 g tablet, 1 table by mouth 4 times a week, Disp: , Rfl: 2  •  valsartan (DIOVAN) 160 MG tablet, Take 1 tablet by mouth Daily. (Patient taking differently: Take 160 mg by mouth 2 (Two) Times a Day.), Disp: 30 tablet, Rfl: 0  •  vitamin B-12 (CYANOCOBALAMIN) 1000 MCG tablet, Place 1,000 mcg under the tongue Daily., Disp: , Rfl:     The following portions of the patient's history were reviewed and updated as appropriate: allergies, current medications, past family history, past medical history, past social history, past surgical history and problem list.        Past Surgical History:   Procedure Laterality Date   • CARDIAC SURGERY     • CAROTID ENDARTERECTOMY     • JOINT REPLACEMENT Left 01/2017    partial hip replacement   • MASTOID SURGERY             Physical Exam  /70 (BP Location: Right arm)   Pulse 59   Temp 97.6 °F (36.4 °C) (Temporal)   Ht 160 cm (63\")   Wt 51.7 kg (114 lb)   SpO2 98%   BMI 20.19 kg/m²     HEENT:    CHEST:    HEART:    ABDOMEN:    EXTREMITIES:    VASCULAR LAB     RADIOLOGY:CTA (7-3-19)  1.  50 to 60% stenosis celiac trunk.  2.  SMA 50 to 60% stenosis  3.  Right renal artery 60 to 70% stenosis  4.  Distal right external iliac artery 40% stenosis  5.  Mid right superficial femoral artery 80% stenosis  6. Distal SFA 70% stenosis diffuse disease distal SFA and tibial vessels right peroneal artery is normal  7.  Distal left common iliac artery 60% stenosis   8.  Distal left external iliac artery 70 to 80% stenosis  9. Diffuse disease left femoral,pop,tibial arteries      Decreased pulses in feet [R09.89]    IMPRESSION:  1.  Diffuse arterial disease that is " certainly in no way limited to the aortoiliac segments.  Diffuse disease probably more significant right than left diffuse disease both lower extremities the distal external iliac level as well as femoral-popliteal tibial levels    The patient continues to smoke.  Her walking is slightly limited by her left hemiparalysis.  She has such diffuse disease that she may elect, after conversation with Dr. Kong, to tolerate her level of intermittent claudication without further attempt to ameliorate her walking limitation.  She appeared in a wheelchair today and uses a walker in her home partially because of her hip problem, partially because of her left hemiparesis, and partially because of her intermittent claudication.  She does not have rest pain she does not have any open wounds of her feet.  Sleep is not disturbed by foot or leg pain            Assessment/Plan  Minerva was seen today for carotid artery disease.    Diagnoses and all orders for this visit:    Decreased pulses in feet    PVD (peripheral vascular disease) (CMS/HCC)                  Return in about 12 days (around 7/22/2019) for To See Dr. Kong.            Jack L. Hamman, MD  7/10/2019

## 2019-07-22 ENCOUNTER — OFFICE VISIT (OUTPATIENT)
Dept: CARDIAC SURGERY | Facility: CLINIC | Age: 84
End: 2019-07-22

## 2019-07-22 VITALS
HEIGHT: 63 IN | WEIGHT: 114 LBS | BODY MASS INDEX: 20.2 KG/M2 | DIASTOLIC BLOOD PRESSURE: 70 MMHG | OXYGEN SATURATION: 98 % | TEMPERATURE: 97.7 F | SYSTOLIC BLOOD PRESSURE: 115 MMHG | HEART RATE: 62 BPM

## 2019-07-22 DIAGNOSIS — I63.9 CEREBROVASCULAR ACCIDENT (CVA), UNSPECIFIED MECHANISM (HCC): ICD-10-CM

## 2019-07-22 DIAGNOSIS — E78.2 MIXED HYPERLIPIDEMIA: ICD-10-CM

## 2019-07-22 DIAGNOSIS — I73.9 PVD (PERIPHERAL VASCULAR DISEASE) (HCC): Primary | Chronic | ICD-10-CM

## 2019-07-22 DIAGNOSIS — I10 ESSENTIAL HYPERTENSION: Chronic | ICD-10-CM

## 2019-07-22 DIAGNOSIS — I25.10 CORONARY ARTERY DISEASE INVOLVING NATIVE CORONARY ARTERY OF NATIVE HEART WITHOUT ANGINA PECTORIS: Chronic | ICD-10-CM

## 2019-07-22 PROCEDURE — 99214 OFFICE O/P EST MOD 30 MIN: CPT | Performed by: THORACIC SURGERY (CARDIOTHORACIC VASCULAR SURGERY)

## 2019-07-22 PROCEDURE — 99406 BEHAV CHNG SMOKING 3-10 MIN: CPT | Performed by: THORACIC SURGERY (CARDIOTHORACIC VASCULAR SURGERY)

## 2019-07-22 RX ORDER — CILOSTAZOL 100 MG/1
100 TABLET ORAL 2 TIMES DAILY
Qty: 60 TABLET | Refills: 11 | Status: SHIPPED | OUTPATIENT
Start: 2019-07-22 | End: 2020-07-21

## 2019-07-23 PROBLEM — E11.9 DIABETES MELLITUS (HCC): Status: ACTIVE | Noted: 2019-07-23

## 2019-07-23 PROBLEM — E78.2 MIXED HYPERLIPIDEMIA: Status: ACTIVE | Noted: 2019-07-23

## 2019-07-23 NOTE — PROGRESS NOTES
7/22/2019    Minerva Miles Kumarerson  1935    Chief Complaint:    Chief Complaint   Patient presents with   • Peripheral Vascular Disease       HPI:      PCP:  Ariana Adkins, DO    84 y.o. female with HTN(stable, increased risk stroke, rupture), Hyperlipidemia(stable, increased risk cardiovascular events), Diabetes Mellitus(stable, increased risk cardiovascular events), Smoker(uncontrolled, increased risk cardiovascular events) and COPD(stable, increased risk pulmonary complications) , PVD(stable, increase risk cardiovascular events).  smokes 1/2 PPD.  Moderate leg weakness, balance problems, leg cramps at night.  Limited claudication symptoms.  Uses walker, limited ambulation.  Prior stroke, hip replacement.  No other associated signs, symptoms or modifying factors.    2/2017 IDALIA:  RIGHT .79 triphasic.  LEFT .76 triphasic.  3/2018 IDALIA:  RIGHT .88 triphasic.  LEFT .85 triphasic.  3/2019 IDALIA:  RIGHT .55 biphasic.  LEFT .62 biphasic.  6/2019 IDALIA:  RIGHT .49 tri/biphasic.  LEFT .75 tri/biphasic.  7/2019 CTA runoff:  Focal dissection distal thoracic aorta, distal aorta 10mm, circumferential calcification.  RIGHT SFA 80%, pop/tibial occlusion.  LEFT EIA 60% bifucation, 80% mid.  SFA moderate diffuse disease, 2v runoff.    2/2017 Carotid Duplex:  DENVER 0-49% (120/18cm/s, ratio 0.9), LICA >70% (230/36cm/s, ratio 1.8) antegrade verts.  3/2018 Carotid Duplex:  DENVER 0-49% (73/19cm/s, ratio 0.7), LICA 50-69% (186/39cm/s, ratio 1.8) antegrade verts.  3/2019 Carotid Duplex:  DENVER 0-49% (111/22cm/s, ratio 0.8), LICA 50-69% (183/41cm/s, ratio 1.9) antegrade verts.    10/2018 ECG:  , QTc 440  10/2018 Echocardiogram:  EF 55%, LA 34mm, LV 33mm, RVSP 33mmHg.   Mild MR TR.    The following portions of the patient's history were reviewed and updated as appropriate: allergies, current medications, past family history, past medical history, past social history, past surgical history and problem list.  Recent images independently  reviewed.  Available laboratory values reviewed.    PMH:  Past Medical History:   Diagnosis Date   • Appetite loss    • Arthritis    • Coughing    • Diabetes mellitus (CMS/HCC)    • Essential hypertension    • Leg pain    • Muscle weakness    • Postmenopausal bleeding     With thickened endometrial stripe    • Stroke (CMS/HCC) 02/2016     Past Surgical History:   Procedure Laterality Date   • CARDIAC SURGERY     • CAROTID ENDARTERECTOMY     • JOINT REPLACEMENT Left 01/2017    partial hip replacement   • MASTOID SURGERY       Family History   Problem Relation Age of Onset   • No Known Problems Mother    • No Known Problems Father      Social History     Tobacco Use   • Smoking status: Current Every Day Smoker     Packs/day: 1.00     Years: 56.00     Pack years: 56.00     Types: Cigarettes   • Smokeless tobacco: Never Used   Substance Use Topics   • Alcohol use: No   • Drug use: No       ALLERGIES:  Allergies   Allergen Reactions   • Amoxicillin    • Erythromycin Base    • Iodine    • Shellfish-Derived Products    • Simvastatin Myalgia         MEDICATIONS:    Current Outpatient Medications:   •  aspirin 81 MG tablet, Take 81 mg by mouth Daily., Disp: , Rfl:   •  CloNIDine (CATAPRES) 0.2 MG tablet, TAKE 1 TABLET BY MOUTH TWICE A DAY, Disp: , Rfl: 0  •  clopidogrel (PLAVIX) 75 MG tablet, Take 75 mg by mouth Daily., Disp: , Rfl:   •  [START ON 7/26/2019] diphenhydrAMINE (BENADRYL) 50 MG capsule, Take 1 capsule by mouth Daily for 1 day. 1 dose by mouth  7/3/2019 @ 8:30a, Disp: 1 capsule, Rfl: 0  •  hydrALAZINE (APRESOLINE) 25 MG tablet, Take 25 mg by mouth 3 (Three) Times a Day., Disp: , Rfl:   •  isosorbide mononitrate (IMDUR) 30 MG 24 hr tablet, Take 30 mg by mouth Daily., Disp: , Rfl:   •  Loratadine 10 MG capsule, Take 10 mg by mouth Daily As Needed. Allergy relief, Disp: , Rfl:   •  metFORMIN (GLUCOPHAGE) 500 MG tablet, Take 500 mg by mouth 2 (Two) Times a Day With Meals., Disp: , Rfl:   •  METOPROLOL TARTRATE PO,  Take 50 mg by mouth 2 (Two) Times a Day., Disp: , Rfl:   •  Omega-3 Fatty Acids (OMEGA-3 FISH OIL) 1000 MG capsule, Take 1,000 mg by mouth Daily., Disp: , Rfl:   •  predniSONE (DELTASONE) 50 MG tablet, Take 1 tablet by mouth Daily. 1 dose by mouth 7/2/2019 at bedtime, 1 dose by mouth  7/3/2019 @ 5:00a, 1 dose by mouth  7/3/2019 @ 8:30a, Disp: 3 tablet, Rfl: 0  •  sodium chloride 1 g tablet, 1 table by mouth 4 times a week, Disp: , Rfl: 2  •  valsartan (DIOVAN) 160 MG tablet, Take 1 tablet by mouth Daily. (Patient taking differently: Take 160 mg by mouth 2 (Two) Times a Day.), Disp: 30 tablet, Rfl: 0  •  vitamin B-12 (CYANOCOBALAMIN) 1000 MCG tablet, Place 1,000 mcg under the tongue Daily., Disp: , Rfl:   •  cilostazol (PLETAL) 100 MG tablet, Take 1 tablet by mouth 2 (Two) Times a Day., Disp: 60 tablet, Rfl: 11    Review of Systems   Review of Systems   Constitution: Positive for weakness and malaise/fatigue. Negative for night sweats and weight loss.   HENT: Positive for hearing loss. Negative for hoarse voice and stridor.    Eyes: Negative for vision loss in left eye, vision loss in right eye and visual disturbance.   Cardiovascular: Positive for dyspnea on exertion. Negative for chest pain, leg swelling and palpitations.   Respiratory: Negative for cough, hemoptysis and shortness of breath.    Hematologic/Lymphatic: Negative for adenopathy and bleeding problem. Bruises/bleeds easily.   Skin: Negative for color change, poor wound healing and rash.   Musculoskeletal: Positive for arthritis, falls, muscle cramps and stiffness. Negative for back pain, muscle weakness and neck pain.   Gastrointestinal: Negative for abdominal pain, dysphagia and heartburn.   Neurological: Positive for dizziness, loss of balance, numbness and paresthesias. Negative for seizures.   Psychiatric/Behavioral: Negative for altered mental status, depression and memory loss. The patient is not nervous/anxious.        Physical Exam   Vitals:     "07/22/19 1536   BP: 115/70   BP Location: Right arm   Pulse: 62   Temp: 97.7 °F (36.5 °C)   TempSrc: Temporal   SpO2: 98%   Weight: 51.7 kg (114 lb)   Height: 160 cm (63\")     Physical Exam   Constitutional: She is oriented to person, place, and time. She is active and cooperative. She does not appear ill. No distress.   HENT:   Head: Atraumatic.   Right Ear: Hearing normal.   Left Ear: Hearing normal.   Nose: No nasal deformity. No epistaxis.   Mouth/Throat: She does not have dentures. Normal dentition.   Eyes: Conjunctivae and lids are normal. Right pupil is round and reactive. Left pupil is round and reactive.   Neck: No JVD present. Carotid bruit is not present. No tracheal deviation present. No thyroid mass and no thyromegaly present.   Cardiovascular: Normal rate and regular rhythm.   No murmur heard.  Pulses:       Carotid pulses are 2+ on the right side with bruit, and 2+ on the left side with bruit.       Radial pulses are 2+ on the right side, and 2+ on the left side.        Dorsalis pedis pulses are 1+ on the right side, and 1+ on the left side.        Posterior tibial pulses are 1+ on the right side, and 1+ on the left side.   Pulmonary/Chest: Effort normal and breath sounds normal.   Abdominal: Soft. She exhibits no distension and no mass. There is no splenomegaly or hepatomegaly. There is no tenderness.   Musculoskeletal: She exhibits no deformity.   Gait normal.    Lymphadenopathy:     She has no cervical adenopathy.        Right: No supraclavicular adenopathy present.        Left: No supraclavicular adenopathy present.   Neurological: She is alert and oriented to person, place, and time. She has normal strength.   Skin: Skin is warm and dry. No cyanosis or erythema. No pallor.   No venous staining   Psychiatric: She has a normal mood and affect. Her speech is normal. Judgment and thought content normal.     Results for CYR FOSTER NETTLES (MRN [id number]) as of 7/23/2019 13:48  GFR 81 Ref. Range " 6/26/2019 13:25   Creatinine Latest Ref Range: 0.57 - 1.00 mg/dL 0.69   BUN Latest Ref Range: 8 - 23 mg/dL 13     ASSESSMENT:  Minerva was seen today for peripheral vascular disease.    Diagnoses and all orders for this visit:    PVD (peripheral vascular disease) (CMS/HCC)  -     Doppler Ankle Brachial Index Single Level CAR; Future    Cerebrovascular accident (CVA), unspecified mechanism (CMS/HCC)    Essential hypertension    Coronary artery disease involving native coronary artery of native heart without angina pectoris    Mixed hyperlipidemia    Other orders  -     cilostazol (PLETAL) 100 MG tablet; Take 1 tablet by mouth 2 (Two) Times a Day.    PLAN:  Detailed discussion with Minerva Madrid regarding situation and options.  Stable PVD.  limited claudication symptoms.  Weakness, poor balance, leg cramps at night.   .  Multiple risk factors with severe comorbidities. Medical therapy vs intervention discussed.  She prefers medical therapy at this time. No intervention indicated at this time.  Will follow with interval imaging.  Risks, benefits discussed.  Understands and wishes to proceed with plan.    Pletal 100mg BID  Return in 3 months with IDALIA    Return after above studies complete  Smoking cessation advised and assistance options offered including behavioral counseling ([facility]), Nicotine replacement therapy (patches or gum), pharmacologic therapy (Chantix, Wellbutrin). patient understands that continued use of tobacco products increases risk of heart disease, stroke, cancer; counseling for 3-5min.  Recommended regular physical activity, progressive walking program.  Continue current medications as directed.    Thank you for the opportunity to participate in this patient's care.    Copy to primary care provider.    EMR Dragon/Transcription disclaimer:   Much of this encounter note is an electronic transcription/translation of spoken language to printed text. The electronic  translation of spoken language may permit erroneous, or at times, nonsensical words or phrases to be inadvertently transcribed; Although I have reviewed the note for such errors, some may still exist.

## 2019-10-21 ENCOUNTER — OFFICE VISIT (OUTPATIENT)
Dept: CARDIAC SURGERY | Facility: CLINIC | Age: 84
End: 2019-10-21

## 2019-10-21 VITALS
HEIGHT: 63 IN | WEIGHT: 114 LBS | BODY MASS INDEX: 20.2 KG/M2 | HEART RATE: 86 BPM | TEMPERATURE: 97.7 F | OXYGEN SATURATION: 98 % | SYSTOLIC BLOOD PRESSURE: 130 MMHG | DIASTOLIC BLOOD PRESSURE: 62 MMHG

## 2019-10-21 DIAGNOSIS — E78.2 MIXED HYPERLIPIDEMIA: ICD-10-CM

## 2019-10-21 DIAGNOSIS — G89.29 CHRONIC LOW BACK PAIN, UNSPECIFIED BACK PAIN LATERALITY, UNSPECIFIED WHETHER SCIATICA PRESENT: ICD-10-CM

## 2019-10-21 DIAGNOSIS — I10 ESSENTIAL HYPERTENSION: Chronic | ICD-10-CM

## 2019-10-21 DIAGNOSIS — I73.9 PVD (PERIPHERAL VASCULAR DISEASE) (HCC): Primary | Chronic | ICD-10-CM

## 2019-10-21 DIAGNOSIS — I25.10 CORONARY ARTERY DISEASE INVOLVING NATIVE CORONARY ARTERY OF NATIVE HEART WITHOUT ANGINA PECTORIS: Chronic | ICD-10-CM

## 2019-10-21 DIAGNOSIS — I65.22 ASYMPTOMATIC STENOSIS OF LEFT CAROTID ARTERY: ICD-10-CM

## 2019-10-21 DIAGNOSIS — E11.9 TYPE 2 DIABETES MELLITUS WITHOUT COMPLICATION, WITHOUT LONG-TERM CURRENT USE OF INSULIN (HCC): ICD-10-CM

## 2019-10-21 DIAGNOSIS — M54.50 CHRONIC LOW BACK PAIN, UNSPECIFIED BACK PAIN LATERALITY, UNSPECIFIED WHETHER SCIATICA PRESENT: ICD-10-CM

## 2019-10-21 PROCEDURE — 99214 OFFICE O/P EST MOD 30 MIN: CPT | Performed by: THORACIC SURGERY (CARDIOTHORACIC VASCULAR SURGERY)

## 2019-11-08 NOTE — PROGRESS NOTES
10/21/2019    Minerva Madrid  1935    Chief Complaint:  PVD    HPI:      PCP:  Ariana Adkins, DO     84 y.o. female with HTN(stable, increased risk stroke, rupture), Hyperlipidemia(stable, increased risk cardiovascular events), Diabetes Mellitus(stable, increased risk cardiovascular events), Smoker(uncontrolled, increased risk cardiovascular events) and COPD(stable, increased risk pulmonary complications) , PVD(stable, increase risk cardiovascular events).  smokes 1/2 PPD.  Moderate leg weakness, balance problems, leg cramps at night.  Limited claudication symptoms.  Uses walker, limited ambulation.  Prior stroke, hip replacement.  No other associated signs, symptoms or modifying factors.     2/2017 IDALIA:  RIGHT .79 triphasic.  LEFT .76 triphasic.  3/2018 IDALIA:  RIGHT .88 triphasic.  LEFT .85 triphasic.  3/2019 IDALIA:  RIGHT .55 biphasic.  LEFT .62 biphasic.  6/2019 IDALIA:  RIGHT .49 tri/biphasic.  LEFT .75 tri/biphasic.  7/2019 CTA runoff:  Focal dissection distal thoracic aorta, distal aorta 10mm, circumferential calcification.  RIGHT SFA 80%, pop/tibial occlusion.  LEFT EIA 60% bifucation, 80% mid.  SFA moderate diffuse disease, 2v runoff.  10/2019 IDALIA:  RIGHT .43 tri/biphasic.  LEFT .51 biphasic.     LEFT CEA  2/2017 Carotid Duplex:  DENVER 0-49% (120/18cm/s, ratio 0.9), LICA >70% (230/36cm/s, ratio 1.8) antegrade verts.  3/2018 Carotid Duplex:  DENVER 0-49% (73/19cm/s, ratio 0.7), LICA 50-69% (186/39cm/s, ratio 1.8) antegrade verts.  3/2019 Carotid Duplex:  DENVER 0-49% (111/22cm/s, ratio 0.8), LICA 50-69% (183/41cm/s, ratio 1.9) antegrade verts.     10/2018 ECG:  , QTc 440  10/2018 Echocardiogram:  EF 55%, LA 34mm, LV 33mm, RVSP 33mmHg.   Mild MR TR.    The following portions of the patient's history were reviewed and updated as appropriate: allergies, current medications, past family history, past medical history, past social history, past surgical history and problem list.  Recent images independently  reviewed.  Available laboratory values reviewed.    PMH:  Past Medical History:   Diagnosis Date   • Appetite loss    • Arthritis    • Coughing    • Diabetes mellitus (CMS/HCC)    • Essential hypertension    • Leg pain    • Muscle weakness    • Postmenopausal bleeding     With thickened endometrial stripe    • Stroke (CMS/HCC) 02/2016     Past Surgical History:   Procedure Laterality Date   • CARDIAC SURGERY     • CAROTID ENDARTERECTOMY     • JOINT REPLACEMENT Left 01/2017    partial hip replacement   • MASTOID SURGERY       Family History   Problem Relation Age of Onset   • No Known Problems Mother    • No Known Problems Father      Social History     Tobacco Use   • Smoking status: Current Every Day Smoker     Packs/day: 1.00     Years: 56.00     Pack years: 56.00     Types: Cigarettes   • Smokeless tobacco: Never Used   Substance Use Topics   • Alcohol use: No   • Drug use: No       ALLERGIES:  Allergies   Allergen Reactions   • Amoxicillin    • Erythromycin Base    • Iodine    • Shellfish-Derived Products    • Simvastatin Myalgia         MEDICATIONS:    Current Outpatient Medications:   •  aspirin 81 MG tablet, Take 81 mg by mouth Daily., Disp: , Rfl:   •  cilostazol (PLETAL) 100 MG tablet, Take 1 tablet by mouth 2 (Two) Times a Day., Disp: 60 tablet, Rfl: 11  •  CloNIDine (CATAPRES) 0.2 MG tablet, TAKE 1 TABLET BY MOUTH TWICE A DAY, Disp: , Rfl: 0  •  clopidogrel (PLAVIX) 75 MG tablet, Take 75 mg by mouth Daily., Disp: , Rfl:   •  hydrALAZINE (APRESOLINE) 25 MG tablet, Take 25 mg by mouth 3 (Three) Times a Day., Disp: , Rfl:   •  isosorbide mononitrate (IMDUR) 30 MG 24 hr tablet, Take 30 mg by mouth Daily., Disp: , Rfl:   •  Loratadine 10 MG capsule, Take 10 mg by mouth Daily As Needed. Allergy relief, Disp: , Rfl:   •  METOPROLOL TARTRATE PO, Take 50 mg by mouth 2 (Two) Times a Day., Disp: , Rfl:   •  Omega-3 Fatty Acids (OMEGA-3 FISH OIL) 1000 MG capsule, Take 1,000 mg by mouth Daily., Disp: , Rfl:   •   "sodium chloride 1 g tablet, 1 table by mouth 4 times a week, Disp: , Rfl: 2  •  valsartan (DIOVAN) 160 MG tablet, Take 1 tablet by mouth Daily. (Patient taking differently: Take 160 mg by mouth 2 (Two) Times a Day.), Disp: 30 tablet, Rfl: 0  •  vitamin B-12 (CYANOCOBALAMIN) 1000 MCG tablet, Place 1,000 mcg under the tongue Daily., Disp: , Rfl:     Review of Systems   Review of Systems   Constitution: Positive for weakness and malaise/fatigue. Negative for weight loss.   HENT: Positive for hearing loss.    Cardiovascular: Positive for dyspnea on exertion. Negative for chest pain and claudication.   Respiratory: Negative for cough and shortness of breath.    Hematologic/Lymphatic: Bruises/bleeds easily.   Skin: Negative for color change and poor wound healing.   Musculoskeletal: Positive for arthritis, falls, muscle cramps and stiffness.   Neurological: Positive for dizziness, loss of balance, numbness and paresthesias.       Physical Exam   Vitals:    10/21/19 1409   BP: 130/62   BP Location: Left arm   Pulse: 86   Temp: 97.7 °F (36.5 °C)   TempSrc: Temporal   SpO2: 98%   Weight: 51.7 kg (114 lb)   Height: 160 cm (63\")     Physical Exam   Constitutional: She is oriented to person, place, and time. She is active and cooperative. She does not appear ill. No distress.   HENT:   Right Ear: Hearing normal.   Left Ear: Hearing normal.   Nose: No nasal deformity. No epistaxis.   Mouth/Throat: She does not have dentures. Normal dentition.   Cardiovascular: Normal rate and regular rhythm.   No murmur heard.  Pulses:       Carotid pulses are 2+ on the right side with bruit, and 2+ on the left side with bruit.       Radial pulses are 2+ on the right side, and 2+ on the left side.        Dorsalis pedis pulses are 1+ on the right side, and 1+ on the left side.        Posterior tibial pulses are 1+ on the right side, and 1+ on the left side.   Pulmonary/Chest: Effort normal and breath sounds normal.   Abdominal: Soft. She exhibits " no distension and no mass. There is no tenderness.   Musculoskeletal: She exhibits no deformity.   Gait normal.    Neurological: She is alert and oriented to person, place, and time. She has normal strength.   Skin: Skin is warm and dry. No cyanosis or erythema. No pallor.   No venous staining   Psychiatric: She has a normal mood and affect. Her speech is normal. Judgment and thought content normal.     Results for GER FOSTER NETTLES (MRN [id number]) as of 11/8/2019 08:21  GFR 81 Ref. Range 6/26/2019 13:25   Creatinine Latest Ref Range: 0.57 - 1.00 mg/dL 0.69   BUN Latest Ref Range: 8 - 23 mg/dL 13     ASSESSMENT:  Foster was seen today for peripheral vascular disease.    Diagnoses and all orders for this visit:    PVD (peripheral vascular disease) (CMS/[facility])  -     Doppler Ankle Brachial Index Single Level CAR; Future    Coronary artery disease involving native coronary artery of native heart without angina pectoris    Essential hypertension    Asymptomatic stenosis of left carotid artery    Mixed hyperlipidemia    Type 2 diabetes mellitus without complication, without long-term current use of insulin (CMS/[facility])    Chronic low back pain, unspecified back pain laterality, unspecified whether sciatica present    PLAN:  Detailed discussion with Foster Madrid regarding situation and options.  Stable PVD.  limited claudication symptoms.  Weakness, poor balance, leg cramps at night.   .  Multiple risk factors with severe comorbidities. Medical therapy vs intervention discussed.  She prefers medical therapy at this time. No intervention indicated at this time.  Will follow with interval imaging.  Risks, benefits discussed.  Understands and wishes to proceed with plan.     Return in 6 months with IDALIA     Return after above studies complete  Smoking cessation advised and assistance options offered including behavioral counseling ([facility]ess[facility]), Nicotine replacement therapy (patches or gum),  pharmacologic therapy (Chantix, Wellbutrin). patient understands that continued use of tobacco products increases risk of heart disease, stroke, cancer; counseling for 3-5min.  Recommended regular physical activity, progressive walking program.  Continue current medications as directed.    Thank you for the opportunity to participate in this patient's care.    Copy to primary care provider.    EMR Dragon/Transcription disclaimer:   Much of this encounter note is an electronic transcription/translation of spoken language to printed text. The electronic translation of spoken language may permit erroneous, or at times, nonsensical words or phrases to be inadvertently transcribed; Although I have reviewed the note for such errors, some may still exist.

## 2020-06-22 ENCOUNTER — OFFICE VISIT (OUTPATIENT)
Dept: CARDIAC SURGERY | Facility: CLINIC | Age: 85
End: 2020-06-22

## 2020-06-22 VITALS
BODY MASS INDEX: 20.19 KG/M2 | HEIGHT: 63 IN | OXYGEN SATURATION: 97 % | DIASTOLIC BLOOD PRESSURE: 90 MMHG | HEART RATE: 67 BPM | TEMPERATURE: 98.4 F | SYSTOLIC BLOOD PRESSURE: 167 MMHG

## 2020-06-22 DIAGNOSIS — I10 ESSENTIAL HYPERTENSION: Chronic | ICD-10-CM

## 2020-06-22 DIAGNOSIS — I63.9 CEREBROVASCULAR ACCIDENT (CVA), UNSPECIFIED MECHANISM (HCC): ICD-10-CM

## 2020-06-22 DIAGNOSIS — E78.2 MIXED HYPERLIPIDEMIA: ICD-10-CM

## 2020-06-22 DIAGNOSIS — I65.22 ASYMPTOMATIC STENOSIS OF LEFT CAROTID ARTERY: ICD-10-CM

## 2020-06-22 DIAGNOSIS — I73.9 PVD (PERIPHERAL VASCULAR DISEASE) (HCC): Primary | Chronic | ICD-10-CM

## 2020-06-22 DIAGNOSIS — E11.9 TYPE 2 DIABETES MELLITUS WITHOUT COMPLICATION, WITHOUT LONG-TERM CURRENT USE OF INSULIN (HCC): ICD-10-CM

## 2020-06-22 DIAGNOSIS — I25.10 CORONARY ARTERY DISEASE INVOLVING NATIVE CORONARY ARTERY OF NATIVE HEART WITHOUT ANGINA PECTORIS: Chronic | ICD-10-CM

## 2020-06-22 PROCEDURE — 99214 OFFICE O/P EST MOD 30 MIN: CPT | Performed by: THORACIC SURGERY (CARDIOTHORACIC VASCULAR SURGERY)

## 2020-07-17 NOTE — PROGRESS NOTES
6/22/2020    Minerva Madrid  1935    Chief Complaint:  PVD    HPI:      PCP:  Ariana Adkins, DO  84 y.o. female with HTN(stable, increased risk stroke, rupture), Hyperlipidemia(stable, increased risk cardiovascular events), Diabetes Mellitus(stable, increased risk cardiovascular events), Smoker(uncontrolled, increased risk cardiovascular events) and COPD(stable, increased risk pulmonary complications) , PVD(stable, increase risk cardiovascular events).  smokes 1/2 PPD.  Moderate leg weakness, balance problems, leg cramps at night.  Limited claudication symptoms.  Uses walker, limited ambulation.  Prior stroke, hip replacement.  No other associated signs, symptoms or modifying factors.     2/2017 IDALIA:  RIGHT .79 triphasic.  LEFT .76 triphasic.  3/2018 IDALIA:  RIGHT .88 triphasic.  LEFT .85 triphasic.  3/2019 IDALIA:  RIGHT .55 biphasic.  LEFT .62 biphasic.  6/2019 IDALIA:  RIGHT .49 tri/biphasic.  LEFT .75 tri/biphasic.  7/2019 CTA runoff:  Focal dissection distal thoracic aorta, distal aorta 10mm, circumferential calcification.  RIGHT SFA 80%, pop/tibial occlusion.  LEFT EIA 60% bifucation, 80% mid.  SFA moderate diffuse disease, 2v runoff.  10/2019 IDALIA:  RIGHT .43 tri/biphasic.  LEFT .51 biphasic.  6/2020 IDALIA:  RIGHT .66 tri/biphasic.  LEFT .63 tri/biphasic.    LEFT CEA  2/2017 Carotid Duplex:  DENVER 0-49% (120/18cm/s, ratio 0.9), LICA >70% (230/36cm/s, ratio 1.8) antegrade verts.  3/2018 Carotid Duplex:  DENVER 0-49% (73/19cm/s, ratio 0.7), LICA 50-69% (186/39cm/s, ratio 1.8) antegrade verts.  3/2019 Carotid Duplex:  DENVER 0-49% (111/22cm/s, ratio 0.8), LICA 50-69% (183/41cm/s, ratio 1.9) antegrade verts.     10/2018 ECG:  , QTc 440  10/2018 Echocardiogram:  EF 55%, LA 34mm, LV 33mm, RVSP 33mmHg.   Mild MR TR.    The following portions of the patient's history were reviewed and updated as appropriate: allergies, current medications, past family history, past medical history, past social history, past surgical  history and problem list.  Recent images independently reviewed.  Available laboratory values reviewed.    PMH:  Past Medical History:   Diagnosis Date   • Appetite loss    • Arthritis    • Coughing    • Diabetes mellitus (CMS/HCC)    • Essential hypertension    • Leg pain    • Muscle weakness    • Postmenopausal bleeding     With thickened endometrial stripe    • Stroke (CMS/HCC) 02/2016     Past Surgical History:   Procedure Laterality Date   • CARDIAC SURGERY     • CAROTID ENDARTERECTOMY     • JOINT REPLACEMENT Left 01/2017    partial hip replacement   • MASTOID SURGERY       Family History   Problem Relation Age of Onset   • No Known Problems Mother    • No Known Problems Father      Social History     Tobacco Use   • Smoking status: Current Every Day Smoker     Packs/day: 1.00     Years: 56.00     Pack years: 56.00     Types: Cigarettes   • Smokeless tobacco: Never Used   Substance Use Topics   • Alcohol use: No   • Drug use: No       ALLERGIES:  Allergies   Allergen Reactions   • Amoxicillin    • Erythromycin Base    • Iodine    • Shellfish-Derived Products    • Simvastatin Myalgia         MEDICATIONS:    Current Outpatient Medications:   •  aspirin 81 MG tablet, Take 81 mg by mouth Daily., Disp: , Rfl:   •  cilostazol (PLETAL) 100 MG tablet, Take 1 tablet by mouth 2 (Two) Times a Day., Disp: 60 tablet, Rfl: 11  •  CloNIDine (CATAPRES) 0.2 MG tablet, TAKE 1 TABLET BY MOUTH TWICE A DAY, Disp: , Rfl: 0  •  clopidogrel (PLAVIX) 75 MG tablet, Take 75 mg by mouth Daily., Disp: , Rfl:   •  hydrALAZINE (APRESOLINE) 25 MG tablet, Take 25 mg by mouth 3 (Three) Times a Day., Disp: , Rfl:   •  isosorbide mononitrate (IMDUR) 30 MG 24 hr tablet, Take 30 mg by mouth Daily., Disp: , Rfl:   •  Loratadine 10 MG capsule, Take 10 mg by mouth Daily As Needed. Allergy relief, Disp: , Rfl:   •  METOPROLOL TARTRATE PO, Take 50 mg by mouth 2 (Two) Times a Day., Disp: , Rfl:   •  Omega-3 Fatty Acids (OMEGA-3 FISH OIL) 1000 MG capsule,  "Take 1,000 mg by mouth Daily., Disp: , Rfl:   •  sodium chloride 1 g tablet, 1 table by mouth 4 times a week, Disp: , Rfl: 2  •  valsartan (DIOVAN) 160 MG tablet, Take 1 tablet by mouth Daily. (Patient taking differently: Take 160 mg by mouth 2 (Two) Times a Day.), Disp: 30 tablet, Rfl: 0  •  vitamin B-12 (CYANOCOBALAMIN) 1000 MCG tablet, Place 1,000 mcg under the tongue Daily., Disp: , Rfl:     Review of Systems   Review of Systems   Constitution: Positive for malaise/fatigue. Negative for weight loss.   HENT: Positive for hearing loss.    Cardiovascular: Positive for dyspnea on exertion. Negative for chest pain and claudication.   Respiratory: Negative for cough and shortness of breath.    Hematologic/Lymphatic: Bruises/bleeds easily.   Skin: Negative for color change and poor wound healing.   Musculoskeletal: Positive for arthritis, falls, muscle cramps and stiffness.   Neurological: Positive for dizziness, loss of balance, numbness and paresthesias. Negative for weakness.       Physical Exam   Vitals:    06/22/20 1511   BP: 167/90   BP Location: Right arm   Patient Position: Sitting   Cuff Size: Adult   Pulse: 67   Temp: 98.4 °F (36.9 °C)   TempSrc: Temporal   SpO2: 97%   Height: 160 cm (63\")     Physical Exam   Constitutional: She is oriented to person, place, and time. She is active and cooperative. She does not appear ill. No distress.   HENT:   Right Ear: Hearing normal.   Left Ear: Hearing normal.   Nose: No nasal deformity. No epistaxis.   Mouth/Throat: She does not have dentures. Normal dentition.   Cardiovascular: Normal rate and regular rhythm.   No murmur heard.  Pulses:       Carotid pulses are 2+ on the right side with bruit, and 2+ on the left side with bruit.       Radial pulses are 2+ on the right side, and 2+ on the left side.        Dorsalis pedis pulses are 1+ on the right side, and 1+ on the left side.        Posterior tibial pulses are 1+ on the right side, and 1+ on the left side. "   Pulmonary/Chest: Effort normal and breath sounds normal.   Abdominal: Soft. She exhibits no distension and no mass. There is no tenderness.   Musculoskeletal: She exhibits no deformity.   Gait normal.    Neurological: She is alert and oriented to person, place, and time. She has normal strength.   Skin: Skin is warm and dry. No cyanosis or erythema. No pallor.   No venous staining   Psychiatric: She has a normal mood and affect. Her speech is normal. Judgment and thought content normal.       BUN   Date Value Ref Range Status   06/26/2019 13 8 - 23 mg/dL Final     Creatinine   Date Value Ref Range Status   06/26/2019 0.69 0.57 - 1.00 mg/dL Final     eGFR Non  Amer   Date Value Ref Range Status   06/26/2019 81 >60 mL/min/1.73 Final       ASSESSMENT:  Minerva was seen today for follow-up and peripheral vascular disease.    Diagnoses and all orders for this visit:    PVD (peripheral vascular disease) (CMS/HCC)  -     Doppler Ankle Brachial Index Single Level CAR; Future    Coronary artery disease involving native coronary artery of native heart without angina pectoris    Asymptomatic stenosis of left carotid artery  -     Duplex Carotid Ultrasound CAR; Future    Essential hypertension    Mixed hyperlipidemia    Cerebrovascular accident (CVA), unspecified mechanism (CMS/HCC)    Type 2 diabetes mellitus without complication, without long-term current use of insulin (CMS/HCC)    PLAN:  Detailed discussion with Minerva Madrid regarding situation and options.  Stable PVD.  Multiple risk factors with severe comorbidities.  No intervention indicated at this time.  Will follow with interval imaging.  Risks, benefits discussed.  Understands and wishes to proceed with plan.    Return in 1 year with IDALIA, Carotid Duplex    Return after above studies complete  Smoking cessation advised and assistance options offered including behavioral counseling ([facility]), Nicotine replacement therapy  (patches or gum), pharmacologic therapy (Chantix, Wellbutrin). patient understands that continued use of tobacco products increases risk of heart disease, stroke, cancer; counseling for 3-5min.  Recommended regular physical activity, progressive walking program.  Continue current medications as directed.    Thank you for the opportunity to participate in this patient's care.    Copy to primary care provider.    EMR Dragon/Transcription disclaimer:   Much of this encounter note is an electronic transcription/translation of spoken language to printed text. The electronic translation of spoken language may permit erroneous, or at times, nonsensical words or phrases to be inadvertently transcribed; Although I have reviewed the note for such errors, some may still exist.

## 2020-08-14 RX ORDER — CILOSTAZOL 100 MG/1
TABLET ORAL
Qty: 180 TABLET | Refills: 3 | OUTPATIENT
Start: 2020-08-14

## 2020-09-09 RX ORDER — CILOSTAZOL 100 MG/1
TABLET ORAL
Qty: 180 TABLET | Refills: 3 | OUTPATIENT
Start: 2020-09-09

## 2020-09-24 RX ORDER — CILOSTAZOL 100 MG/1
TABLET ORAL
Qty: 180 TABLET | Refills: 3 | OUTPATIENT
Start: 2020-09-24

## 2021-08-16 ENCOUNTER — OFFICE VISIT (OUTPATIENT)
Dept: CARDIAC SURGERY | Facility: CLINIC | Age: 86
End: 2021-08-16

## 2021-08-16 VITALS
HEART RATE: 58 BPM | HEIGHT: 62 IN | BODY MASS INDEX: 20.85 KG/M2 | DIASTOLIC BLOOD PRESSURE: 79 MMHG | SYSTOLIC BLOOD PRESSURE: 139 MMHG | OXYGEN SATURATION: 98 % | TEMPERATURE: 98.2 F

## 2021-08-16 DIAGNOSIS — I10 ESSENTIAL HYPERTENSION: Chronic | ICD-10-CM

## 2021-08-16 DIAGNOSIS — I65.23 CAROTID STENOSIS, ASYMPTOMATIC, BILATERAL: Primary | ICD-10-CM

## 2021-08-16 DIAGNOSIS — I63.9 CEREBROVASCULAR ACCIDENT (CVA), UNSPECIFIED MECHANISM (HCC): ICD-10-CM

## 2021-08-16 DIAGNOSIS — E78.2 MIXED HYPERLIPIDEMIA: ICD-10-CM

## 2021-08-16 DIAGNOSIS — F17.218 NICOTINE DEPENDENCE, CIGARETTES, WITH OTHER NICOTINE-INDUCED DISORDERS: ICD-10-CM

## 2021-08-16 DIAGNOSIS — I73.9 PVD (PERIPHERAL VASCULAR DISEASE) (HCC): Chronic | ICD-10-CM

## 2021-08-16 DIAGNOSIS — I25.10 CORONARY ARTERY DISEASE INVOLVING NATIVE CORONARY ARTERY OF NATIVE HEART WITHOUT ANGINA PECTORIS: Chronic | ICD-10-CM

## 2021-08-16 PROCEDURE — 99214 OFFICE O/P EST MOD 30 MIN: CPT | Performed by: THORACIC SURGERY (CARDIOTHORACIC VASCULAR SURGERY)

## 2021-08-16 RX ORDER — PREDNISONE 50 MG/1
50 TABLET ORAL EVERY 6 HOURS SCHEDULED
Qty: 3 TABLET | Refills: 0 | Status: SHIPPED | OUTPATIENT
Start: 2021-08-16 | End: 2021-10-06

## 2021-08-17 PROBLEM — F17.218 NICOTINE DEPENDENCE, CIGARETTES, WITH OTHER NICOTINE-INDUCED DISORDERS: Status: ACTIVE | Noted: 2021-08-17

## 2021-08-17 NOTE — PROGRESS NOTES
8/16/2021    Minerva Miles Kumarerson  1935    Chief Complaint:    Chief Complaint   Patient presents with   • Peripheral Vascular Disease       HPI:      PCP:  Ariana Adkins,      86y.o. female with HTN(stable, increased risk stroke, rupture), Hyperlipidemia(stable, increased risk cardiovascular events), Diabetes Mellitus(stable, increased risk cardiovascular events), Smoker(uncontrolled, increased risk cardiovascular events) and COPD(stable, increased risk pulmonary complications) , PVD(stable, increase risk cardiovascular events).  smokes 1/2 PPD.  Moderate leg weakness, balance problems, leg cramps at night.  Limited claudication symptoms.  Uses walker, limited ambulation.  Prior stroke, hip replacement.  No other associated signs, symptoms or modifying factors.     2/2017 IDALIA:  RIGHT .79 triphasic.  LEFT .76 triphasic.  3/2018 IDALIA:  RIGHT .88 triphasic.  LEFT .85 triphasic.  3/2019 IDALIA:  RIGHT .55 biphasic.  LEFT .62 biphasic.  6/2019 IDALIA:  RIGHT .49 tri/biphasic.  LEFT .75 tri/biphasic.  7/2019 CTA runoff:  Focal dissection distal thoracic aorta, distal aorta 10mm, circumferential calcification.  RIGHT SFA 80%, pop/tibial occlusion.  LEFT EIA 60% bifucation, 80% mid.  SFA moderate diffuse disease, 2v runoff.  10/2019 IDALIA:  RIGHT .43 tri/biphasic.  LEFT .51 biphasic.  6/2020 IDALIA:  RIGHT .66 tri/biphasic.  LEFT .63 tri/biphasic.  8/2021 IDALIA:  RIGHT .63 biphasic.  LEFT .62 biphasic.     2009 LEFT CEA  2/2017 Carotid Duplex:  DENVER 0-49% (120/18cm/s, ratio 0.9), LICA >70% (230/36cm/s, ratio 1.8) antegrade verts.  3/2018 Carotid Duplex:  DENVER 0-49% (73/19cm/s, ratio 0.7), LICA 50-69% (186/39cm/s, ratio 1.8) antegrade verts.  3/2019 Carotid Duplex:  DENVER 0-49% (111/22cm/s, ratio 0.8), LICA 50-69% (183/41cm/s, ratio 1.9) antegrade verts.  8/2021 Carotid Duplex:  DENVER 0-49% (123/14cm/s, ratio 1.0), LICA 50-69% (293/63cm/s, ratio 3.4) antegrade verts.     10/2018 ECG:  , QTc 440  10/2018 Echocardiogram:  EF 55%,  LA 34mm, LV 33mm, RVSP 33mmHg.   Mild MR TR.     The following portions of the patient's history were reviewed and updated as appropriate: allergies, current medications, past family history, past medical history, past social history, past surgical history and problem list.  Recent images independently reviewed.  Available laboratory values reviewed.    PMH:  Past Medical History:   Diagnosis Date   • Appetite loss    • Arthritis    • Coughing    • Diabetes mellitus (CMS/HCC)    • Essential hypertension    • Leg pain    • Muscle weakness    • Postmenopausal bleeding     With thickened endometrial stripe    • Stroke (CMS/HCC) 02/2016     Past Surgical History:   Procedure Laterality Date   • CARDIAC SURGERY     • CAROTID ENDARTERECTOMY     • JOINT REPLACEMENT Left 01/2017    partial hip replacement   • MASTOID SURGERY       Family History   Problem Relation Age of Onset   • No Known Problems Mother    • No Known Problems Father      Social History     Tobacco Use   • Smoking status: Current Every Day Smoker     Packs/day: 1.00     Years: 56.00     Pack years: 56.00     Types: Cigarettes   • Smokeless tobacco: Never Used   Substance Use Topics   • Alcohol use: No   • Drug use: No       ALLERGIES:  Allergies   Allergen Reactions   • Amoxicillin    • Erythromycin Base    • Iodine    • Shellfish-Derived Products    • Simvastatin Myalgia         MEDICATIONS:    Current Outpatient Medications:   •  clopidogrel (PLAVIX) 75 MG tablet, Take 75 mg by mouth Daily., Disp: , Rfl:   •  hydrALAZINE (APRESOLINE) 25 MG tablet, Take 25 mg by mouth 3 (Three) Times a Day., Disp: , Rfl:   •  isosorbide mononitrate (IMDUR) 30 MG 24 hr tablet, Take 30 mg by mouth Daily., Disp: , Rfl:   •  Loratadine 10 MG capsule, Take 10 mg by mouth Daily As Needed. Allergy relief, Disp: , Rfl:   •  METOPROLOL TARTRATE PO, Take 50 mg by mouth 2 (Two) Times a Day., Disp: , Rfl:   •  Omega-3 Fatty Acids (OMEGA-3 FISH OIL) 1000 MG capsule, Take 1,000 mg by  "mouth Daily., Disp: , Rfl:   •  sodium chloride 1 g tablet, 1 table by mouth 4 times a week, Disp: , Rfl: 2  •  valsartan (DIOVAN) 160 MG tablet, Take 1 tablet by mouth Daily. (Patient taking differently: Take 160 mg by mouth 2 (Two) Times a Day.), Disp: 30 tablet, Rfl: 0  •  vitamin B-12 (CYANOCOBALAMIN) 1000 MCG tablet, Place 1,000 mcg under the tongue Daily., Disp: , Rfl:   •  aspirin 81 MG tablet, Take 81 mg by mouth Daily., Disp: , Rfl:   •  CloNIDine (CATAPRES) 0.2 MG tablet, TAKE 1 TABLET BY MOUTH TWICE A DAY, Disp: , Rfl: 0  •  predniSONE (DELTASONE) 50 MG tablet, Take 1 tablet by mouth Every 6 (Six) Hours. Take 13 hours, 7 hours, 1 hour prior to contrast adminstration, Disp: 3 tablet, Rfl: 0    Review of Systems   Review of Systems   Constitutional: Positive for malaise/fatigue. Negative for weight loss.   HENT: Positive for hearing loss.    Cardiovascular: Positive for dyspnea on exertion. Negative for chest pain and claudication.   Respiratory: Negative for cough and shortness of breath.    Hematologic/Lymphatic: Bruises/bleeds easily.   Skin: Negative for color change and poor wound healing.   Musculoskeletal: Positive for back pain, falls, muscle cramps and stiffness.   Neurological: Positive for dizziness, loss of balance, numbness and paresthesias. Negative for weakness.       Physical Exam   Vitals:    08/16/21 1431   BP: 139/79   BP Location: Left arm   Patient Position: Sitting   Pulse: 58   Temp: 98.2 °F (36.8 °C)   TempSrc: Infrared   SpO2: 98%   Height: 157.5 cm (62\")     Body surface area is 1.51 meters squared.  Body mass index is 20.85 kg/m².  Physical Exam  Constitutional:       General: She is not in acute distress.     Appearance: She is not ill-appearing.   HENT:      Right Ear: Hearing normal.      Left Ear: Hearing normal.      Nose: No nasal deformity.      Mouth/Throat:      Dentition: Normal dentition. Does not have dentures.   Cardiovascular:      Rate and Rhythm: Normal rate and " regular rhythm.      Pulses:           Carotid pulses are 2+ on the right side and 2+ on the left side.       Radial pulses are 2+ on the right side and 2+ on the left side.        Dorsalis pedis pulses are 1+ on the right side and 1+ on the left side.        Posterior tibial pulses are 1+ on the right side and 1+ on the left side.      Heart sounds: No murmur heard.     Pulmonary:      Effort: Pulmonary effort is normal.      Breath sounds: Normal breath sounds.   Abdominal:      General: There is no distension.      Palpations: Abdomen is soft. There is no mass.      Tenderness: There is no abdominal tenderness.   Musculoskeletal:         General: No deformity.      Comments: Gait normal.    Skin:     General: Skin is warm and dry.      Coloration: Skin is not pale.      Findings: No erythema.      Comments: No venous staining   Neurological:      Mental Status: She is alert and oriented to person, place, and time.   Psychiatric:         Speech: Speech normal.         Behavior: Behavior is cooperative.         Thought Content: Thought content normal.         Judgment: Judgment normal.         BUN   Date Value Ref Range Status   06/26/2019 13 8 - 23 mg/dL Final     Creatinine   Date Value Ref Range Status   06/26/2019 0.69 0.57 - 1.00 mg/dL Final     eGFR Non  Amer   Date Value Ref Range Status   06/26/2019 81 >60 mL/min/1.73 Final       ASSESSMENT:  Diagnoses and all orders for this visit:    1. Carotid stenosis, asymptomatic, bilateral (Primary)  -     CT Angiogram Carotids; Future    2. Cerebrovascular accident (CVA), unspecified mechanism (CMS/HCC)    3. PVD (peripheral vascular disease) (CMS/HCC)    4. Mixed hyperlipidemia    5. Essential hypertension    6. Coronary artery disease involving native coronary artery of native heart without angina pectoris    7. Nicotine dependence, cigarettes, with other nicotine-induced disorders    Other orders  -     predniSONE (DELTASONE) 50 MG tablet; Take 1 tablet by  mouth Every 6 (Six) Hours. Take 13 hours, 7 hours, 1 hour prior to contrast adminstration  Dispense: 3 tablet; Refill: 0  -     diphenhydrAMINE (BENADRYL) 50 MG tablet; Take 1 tablet by mouth 1 (One) Time for 1 dose. Take 1 hour prior to contrast administration  Dispense: 1 tablet; Refill: 0      PLAN:  Detailed discussion with Minerva Madrid regarding situation and options.  Stable PVD.  Severe Progressive carotid stenosis, prior CEA.  Multiple risk factors with severe comorbidities.  Additional imaging is required to evaluate location and extent of lesion.  Risks, benefits discussed.  Understands and wishes to proceed with plan.    CTA Carotids 8/26/2021    Return after above studies complete  Smoking cessation advised and assistance options offered including behavioral counseling ([facility]ess[facility]), Nicotine replacement therapy (patches or gum), pharmacologic therapy (Chantix, Wellbutrin). patient understands that continued use of tobacco products increases risk of heart disease, stroke, cancer; counseling for 3-5min.    Recommended regular physical activity, progressive walking program.  Continue current medications as directed.  Advance Care Planning   ACP discussion was declined by the patient. Patient does not have an advance directive, declines further assistance.    Thank you for the opportunity to participate in this patient's care.    Copy to primary care provider.

## 2021-08-23 ENCOUNTER — TRANSCRIBE ORDERS (OUTPATIENT)
Dept: CT IMAGING | Facility: HOSPITAL | Age: 86
End: 2021-08-23

## 2021-08-23 DIAGNOSIS — I65.23 ASYMPTOMATIC CAROTID ARTERY STENOSIS, BILATERAL: Primary | ICD-10-CM

## 2021-08-26 ENCOUNTER — HOSPITAL ENCOUNTER (OUTPATIENT)
Dept: CT IMAGING | Facility: HOSPITAL | Age: 86
Discharge: HOME | End: 2021-08-26

## 2021-08-26 ENCOUNTER — LAB (OUTPATIENT)
Dept: LAB | Facility: HOSPITAL | Age: 86
End: 2021-08-26

## 2021-08-26 DIAGNOSIS — I65.23 ASYMPTOMATIC CAROTID ARTERY STENOSIS, BILATERAL: ICD-10-CM

## 2021-08-26 DIAGNOSIS — I65.23 CAROTID STENOSIS, ASYMPTOMATIC, BILATERAL: ICD-10-CM

## 2021-08-26 LAB
BUN SERPL-MCNC: 16 MG/DL (ref 8–23)
CREAT SERPL-MCNC: 0.76 MG/DL (ref 0.57–1)
GFR SERPL CREATININE-BSD FRML MDRD: 72 ML/MIN/1.73

## 2021-08-26 PROCEDURE — 82565 ASSAY OF CREATININE: CPT

## 2021-08-26 PROCEDURE — 70498 CT ANGIOGRAPHY NECK: CPT

## 2021-08-26 PROCEDURE — 36415 COLL VENOUS BLD VENIPUNCTURE: CPT

## 2021-08-26 PROCEDURE — 0 IOPAMIDOL PER 1 ML: Performed by: THORACIC SURGERY (CARDIOTHORACIC VASCULAR SURGERY)

## 2021-08-26 PROCEDURE — 84520 ASSAY OF UREA NITROGEN: CPT

## 2021-08-26 RX ADMIN — IOPAMIDOL 90 ML: 755 INJECTION, SOLUTION INTRAVENOUS at 14:58

## 2021-09-02 ENCOUNTER — OFFICE VISIT (OUTPATIENT)
Dept: CARDIAC SURGERY | Facility: CLINIC | Age: 86
End: 2021-09-02

## 2021-09-02 VITALS
HEART RATE: 57 BPM | BODY MASS INDEX: 20.85 KG/M2 | TEMPERATURE: 97.5 F | SYSTOLIC BLOOD PRESSURE: 148 MMHG | OXYGEN SATURATION: 98 % | HEIGHT: 62 IN | DIASTOLIC BLOOD PRESSURE: 88 MMHG

## 2021-09-02 DIAGNOSIS — I63.9 CEREBROVASCULAR ACCIDENT (CVA), UNSPECIFIED MECHANISM (HCC): ICD-10-CM

## 2021-09-02 DIAGNOSIS — I65.23 BILATERAL CAROTID ARTERY STENOSIS: Primary | ICD-10-CM

## 2021-09-02 DIAGNOSIS — I25.10 CORONARY ARTERY DISEASE INVOLVING NATIVE CORONARY ARTERY OF NATIVE HEART WITHOUT ANGINA PECTORIS: Chronic | ICD-10-CM

## 2021-09-02 DIAGNOSIS — F17.218 NICOTINE DEPENDENCE, CIGARETTES, WITH OTHER NICOTINE-INDUCED DISORDERS: ICD-10-CM

## 2021-09-02 DIAGNOSIS — I10 ESSENTIAL HYPERTENSION: Chronic | ICD-10-CM

## 2021-09-02 DIAGNOSIS — I73.9 PVD (PERIPHERAL VASCULAR DISEASE) (HCC): Chronic | ICD-10-CM

## 2021-09-02 DIAGNOSIS — E78.2 MIXED HYPERLIPIDEMIA: ICD-10-CM

## 2021-09-02 PROCEDURE — 99215 OFFICE O/P EST HI 40 MIN: CPT | Performed by: THORACIC SURGERY (CARDIOTHORACIC VASCULAR SURGERY)

## 2021-09-27 NOTE — PROGRESS NOTES
9/27/2021    Minerva Madrid  1935    Chief Complaint:  Carotid stenosis    HPI:      PCP:  Ariana Adkins, DO     86y.o. female with HTN(stable, increased risk stroke, rupture), Hyperlipidemia(stable, increased risk cardiovascular events), Diabetes Mellitus(stable, increased risk cardiovascular events), Smoker(uncontrolled, increased risk cardiovascular events) and COPD(stable, increased risk pulmonary complications) , PVD(stable, increase risk cardiovascular events), Carotid Stenosis(chronic severe progression, increase risk stroke)  smokes 1/2 PPD.  Moderate leg weakness, balance problems, leg cramps at night.  Limited claudication symptoms.  Uses walker, limited ambulation.  Prior stroke, hip replacement.  No other associated signs, symptoms or modifying factors.     2/2017 IDALIA:  RIGHT .79 triphasic.  LEFT .76 triphasic.  3/2018 IDALIA:  RIGHT .88 triphasic.  LEFT .85 triphasic.  3/2019 IDALIA:  RIGHT .55 biphasic.  LEFT .62 biphasic.  6/2019 IDALIA:  RIGHT .49 tri/biphasic.  LEFT .75 tri/biphasic.  7/2019 CTA runoff:  Focal dissection distal thoracic aorta, distal aorta 10mm, circumferential calcification.  RIGHT SFA 80%, pop/tibial occlusion.  LEFT EIA 60% bifucation, 80% mid.  SFA moderate diffuse disease, 2v runoff.  10/2019 IDALIA:  RIGHT .43 tri/biphasic.  LEFT .51 biphasic.  6/2020 IDALIA:  RIGHT .66 tri/biphasic.  LEFT .63 tri/biphasic.  8/2021 IDALIA:  RIGHT .63 biphasic.  LEFT .62 biphasic.     2009 LEFT CEA  2016 Stroke  2/2017 Carotid Duplex:  DENVER 0-49% (120/18cm/s, ratio 0.9), LICA >70% (230/36cm/s, ratio 1.8) antegrade verts.  3/2018 Carotid Duplex:  DENVER 0-49% (73/19cm/s, ratio 0.7), LICA 50-69% (186/39cm/s, ratio 1.8) antegrade verts.  3/2019 Carotid Duplex:  DENVER 0-49% (111/22cm/s, ratio 0.8), LICA 50-69% (183/41cm/s, ratio 1.9) antegrade verts.  8/2021 Carotid Duplex:  DENVER 0-49% (123/14cm/s, ratio 1.0), LICA 50-69% (293/63cm/s, ratio 3.4) antegrade verts.  8/2021 CTA Carotids:  DENVER no significant  obstruction.  LICA 90%     10/2018 ECG:  , QTc 440  10/2018 Echocardiogram:  EF 55%, LA 34mm, LV 33mm, RVSP 33mmHg.   Mild MR TR.    The following portions of the patient's history were reviewed and updated as appropriate: allergies, current medications, past family history, past medical history, past social history, past surgical history and problem list.  Recent images independently reviewed.  Available laboratory values reviewed.    PMH:  Past Medical History:   Diagnosis Date   • Appetite loss    • Arthritis    • Coughing    • Diabetes mellitus (CMS/HCC)    • Essential hypertension    • Leg pain    • Muscle weakness    • Postmenopausal bleeding     With thickened endometrial stripe    • Stroke (CMS/HCC) 02/2016     Past Surgical History:   Procedure Laterality Date   • CARDIAC SURGERY     • CAROTID ENDARTERECTOMY     • JOINT REPLACEMENT Left 01/2017    partial hip replacement   • MASTOID SURGERY       Family History   Problem Relation Age of Onset   • No Known Problems Mother    • No Known Problems Father      Social History     Tobacco Use   • Smoking status: Current Every Day Smoker     Packs/day: 1.00     Years: 56.00     Pack years: 56.00     Types: Cigarettes   • Smokeless tobacco: Never Used   Substance Use Topics   • Alcohol use: No   • Drug use: No       ALLERGIES:  Allergies   Allergen Reactions   • Amoxicillin    • Erythromycin Base    • Iodine    • Shellfish-Derived Products    • Simvastatin Myalgia         MEDICATIONS:    Current Outpatient Medications:   •  aspirin 81 MG tablet, Take 81 mg by mouth Daily., Disp: , Rfl:   •  CloNIDine (CATAPRES) 0.2 MG tablet, TAKE 1 TABLET BY MOUTH TWICE A DAY, Disp: , Rfl: 0  •  clopidogrel (PLAVIX) 75 MG tablet, Take 75 mg by mouth Daily., Disp: , Rfl:   •  hydrALAZINE (APRESOLINE) 25 MG tablet, Take 25 mg by mouth 3 (Three) Times a Day., Disp: , Rfl:   •  isosorbide mononitrate (IMDUR) 30 MG 24 hr tablet, Take 30 mg by mouth Daily., Disp: , Rfl:   •   "Loratadine 10 MG capsule, Take 10 mg by mouth Daily As Needed. Allergy relief, Disp: , Rfl:   •  METOPROLOL TARTRATE PO, Take 50 mg by mouth 2 (Two) Times a Day., Disp: , Rfl:   •  Omega-3 Fatty Acids (OMEGA-3 FISH OIL) 1000 MG capsule, Take 1,000 mg by mouth Daily., Disp: , Rfl:   •  predniSONE (DELTASONE) 50 MG tablet, Take 1 tablet by mouth Every 6 (Six) Hours. Take 13 hours, 7 hours, 1 hour prior to contrast adminstration, Disp: 3 tablet, Rfl: 0  •  sodium chloride 1 g tablet, 1 table by mouth 4 times a week, Disp: , Rfl: 2  •  valsartan (DIOVAN) 160 MG tablet, Take 1 tablet by mouth Daily. (Patient taking differently: Take 160 mg by mouth 2 (Two) Times a Day.), Disp: 30 tablet, Rfl: 0  •  vitamin B-12 (CYANOCOBALAMIN) 1000 MCG tablet, Place 1,000 mcg under the tongue Daily., Disp: , Rfl:     Review of Systems   Review of Systems   Constitutional: Positive for malaise/fatigue. Negative for weight loss.   HENT: Positive for hearing loss.    Cardiovascular: Positive for dyspnea on exertion. Negative for chest pain and claudication.   Respiratory: Negative for cough and shortness of breath.    Hematologic/Lymphatic: Bruises/bleeds easily.   Skin: Negative for color change and poor wound healing.   Musculoskeletal: Positive for back pain, falls, muscle cramps and stiffness.   Neurological: Positive for dizziness, loss of balance, numbness and paresthesias. Negative for weakness.       Physical Exam   Vitals:    09/02/21 1357   BP: 148/88   BP Location: Left arm   Pulse: 57   Temp: 97.5 °F (36.4 °C)   TempSrc: Infrared   SpO2: 98%   Height: 157.5 cm (62\")     Body surface area is 1.51 meters squared.  Body mass index is 20.85 kg/m².  Physical Exam  Constitutional:       General: She is not in acute distress.     Appearance: She is not ill-appearing.   HENT:      Right Ear: Hearing normal.      Left Ear: Hearing normal.      Nose: No nasal deformity.      Mouth/Throat:      Dentition: Normal dentition. Does not have " dentures.   Cardiovascular:      Rate and Rhythm: Normal rate and regular rhythm.      Pulses:           Carotid pulses are 2+ on the right side and 2+ on the left side.       Radial pulses are 2+ on the right side and 2+ on the left side.        Posterior tibial pulses are 1+ on the right side and 1+ on the left side.      Heart sounds: No murmur heard.     Pulmonary:      Effort: Pulmonary effort is normal.      Breath sounds: Normal breath sounds.   Abdominal:      General: There is no distension.      Palpations: Abdomen is soft. There is no mass.      Tenderness: There is no abdominal tenderness.   Musculoskeletal:         General: No deformity.      Comments: Gait normal.    Skin:     General: Skin is warm and dry.      Coloration: Skin is not pale.      Findings: No erythema.      Comments: No venous staining   Neurological:      Mental Status: She is alert and oriented to person, place, and time.   Psychiatric:         Speech: Speech normal.         Behavior: Behavior is cooperative.         Thought Content: Thought content normal.         Judgment: Judgment normal.         BUN   Date Value Ref Range Status   08/26/2021 16 8 - 23 mg/dL Final     Creatinine   Date Value Ref Range Status   08/26/2021 0.76 0.57 - 1.00 mg/dL Final     eGFR Non  Amer   Date Value Ref Range Status   08/26/2021 72 >60 mL/min/1.73 Final       ASSESSMENT:  Diagnoses and all orders for this visit:    1. Bilateral carotid artery stenosis (Primary)    2. Cerebrovascular accident (CVA), unspecified mechanism (HCC)    3. PVD (peripheral vascular disease) (CMS/HCC)    4. Nicotine dependence, cigarettes, with other nicotine-induced disorders    5. Mixed hyperlipidemia    6. Essential hypertension    7. Coronary artery disease involving native coronary artery of native heart without angina pectoris    PLAN:  Detailed discussion with Minerva Madrid regarding situation, options and plans.  severe,  symptomatic numbness, weakness,  stroke and dizziness carotid stenosis.  Carotid intervention is advisable.  Increased risk for Stroke and cardiovascular complications.  Carotid stenting is advisable.    Risks, including but not limited to:  Mortality, major morbidity 1%.  Stroke risk 2-3%.  bleeding, transfusion, infection, pulmonary, renal dysfunction, blood vessel and nerve injury.  Benefits:  reduction of stroke risk  Options: carotid endarterectomy, stenting and medical therapy discussed.   usually overnight stay in hospital if all well. Understands and wishes to proceed.    LEFT Transcarotid artery revascularization, carotid cerebral arteriogram, carotid stent, possible carotid endarterectomy, radial arterial line, GEN  SDS  Tentative schedule    Repatha periprocedural    Return after above studies complete  Smoking cessation advised and assistance options offered including behavioral counseling ([employer]ess[employer] [facility]), Nicotine replacement therapy (patches or gum), pharmacologic therapy (Chantix, Wellbutrin). patient understands that continued use of tobacco products increases risk of heart disease, stroke, cancer; counseling for 3-5min.    Recommended regular physical activity, progressive walking program.  Continue current medications as directed.  Advance Care Planning   ACP discussion was declined by the patient. Patient does not have an advance directive, declines further assistance.    Thank you for the opportunity to participate in this patient's care.    Copy to primary care provider.

## 2021-10-04 ENCOUNTER — PREP FOR SURGERY (OUTPATIENT)
Dept: OTHER | Facility: HOSPITAL | Age: 86
End: 2021-10-04

## 2021-10-04 DIAGNOSIS — I65.23 BILATERAL CAROTID ARTERY STENOSIS: Primary | ICD-10-CM

## 2021-10-04 RX ORDER — SODIUM CHLORIDE 9 MG/ML
100 INJECTION, SOLUTION INTRAVENOUS CONTINUOUS
Status: CANCELLED | OUTPATIENT
Start: 2021-10-13

## 2021-10-04 RX ORDER — BUPIVACAINE HCL/0.9 % NACL/PF 0.1 %
2 PLASTIC BAG, INJECTION (ML) EPIDURAL ONCE
Status: CANCELLED | OUTPATIENT
Start: 2021-10-04 | End: 2021-10-04

## 2021-10-06 ENCOUNTER — PRE-ADMISSION TESTING (OUTPATIENT)
Dept: PREADMISSION TESTING | Facility: HOSPITAL | Age: 86
End: 2021-10-06

## 2021-10-06 VITALS
SYSTOLIC BLOOD PRESSURE: 152 MMHG | WEIGHT: 107 LBS | OXYGEN SATURATION: 97 % | BODY MASS INDEX: 18.96 KG/M2 | RESPIRATION RATE: 16 BRPM | HEART RATE: 60 BPM | HEIGHT: 63 IN | DIASTOLIC BLOOD PRESSURE: 76 MMHG

## 2021-10-06 LAB
ABO GROUP BLD: NORMAL
ANION GAP SERPL CALCULATED.3IONS-SCNC: 11 MMOL/L (ref 5–15)
BLD GP AB SCN SERPL QL: NEGATIVE
BUN SERPL-MCNC: 10 MG/DL (ref 8–23)
BUN/CREAT SERPL: 14.1 (ref 7–25)
CALCIUM SPEC-SCNC: 9.3 MG/DL (ref 8.6–10.5)
CHLORIDE SERPL-SCNC: 97 MMOL/L (ref 98–107)
CO2 SERPL-SCNC: 26 MMOL/L (ref 22–29)
CREAT SERPL-MCNC: 0.71 MG/DL (ref 0.57–1)
DEPRECATED RDW RBC AUTO: 40.3 FL (ref 37–54)
ERYTHROCYTE [DISTWIDTH] IN BLOOD BY AUTOMATED COUNT: 12.1 % (ref 12.3–15.4)
GFR SERPL CREATININE-BSD FRML MDRD: 78 ML/MIN/1.73
GLUCOSE SERPL-MCNC: 94 MG/DL (ref 65–99)
HCT VFR BLD AUTO: 39.6 % (ref 34–46.6)
HGB BLD-MCNC: 13.8 G/DL (ref 12–15.9)
MCH RBC QN AUTO: 31.4 PG (ref 26.6–33)
MCHC RBC AUTO-ENTMCNC: 34.8 G/DL (ref 31.5–35.7)
MCV RBC AUTO: 90.2 FL (ref 79–97)
PLATELET # BLD AUTO: 299 10*3/MM3 (ref 140–450)
PMV BLD AUTO: 10 FL (ref 6–12)
POTASSIUM SERPL-SCNC: 4.2 MMOL/L (ref 3.5–5.2)
QT INTERVAL: 424 MS
QTC INTERVAL: 440 MS
RBC # BLD AUTO: 4.39 10*6/MM3 (ref 3.77–5.28)
RH BLD: POSITIVE
SODIUM SERPL-SCNC: 134 MMOL/L (ref 136–145)
T&S EXPIRATION DATE: NORMAL
WBC # BLD AUTO: 8.42 10*3/MM3 (ref 3.4–10.8)

## 2021-10-06 PROCEDURE — 86901 BLOOD TYPING SEROLOGIC RH(D): CPT

## 2021-10-06 PROCEDURE — 86900 BLOOD TYPING SEROLOGIC ABO: CPT

## 2021-10-06 PROCEDURE — 85027 COMPLETE CBC AUTOMATED: CPT

## 2021-10-06 PROCEDURE — 93010 ELECTROCARDIOGRAM REPORT: CPT | Performed by: INTERNAL MEDICINE

## 2021-10-06 PROCEDURE — 80048 BASIC METABOLIC PNL TOTAL CA: CPT

## 2021-10-06 PROCEDURE — 36415 COLL VENOUS BLD VENIPUNCTURE: CPT

## 2021-10-06 PROCEDURE — 86850 RBC ANTIBODY SCREEN: CPT

## 2021-10-06 PROCEDURE — 93005 ELECTROCARDIOGRAM TRACING: CPT

## 2021-10-06 RX ORDER — SODIUM BICARBONATE 650 MG/1
325 TABLET ORAL
COMMUNITY

## 2021-10-06 RX ORDER — VALSARTAN 160 MG/1
160 TABLET ORAL 2 TIMES DAILY
COMMUNITY

## 2021-10-06 RX ORDER — EVOLOCUMAB 140 MG/ML
140 INJECTION, SOLUTION SUBCUTANEOUS
COMMUNITY
End: 2021-10-22 | Stop reason: SDUPTHER

## 2021-10-06 NOTE — PAT
Chlorhexidine scrub given with instruction sheet. Instructions reviewed in PAT, understanding verbalized.    No changes noted on EKG from previous one. No new symptoms.    Patient is on Aspirin and Plavix daily. Spoke with Merrick KHALIL about patient not being on statin and he stated she is on Repatha injections in place of the statin.

## 2021-10-06 NOTE — DISCHARGE INSTRUCTIONS
[facility]  Pre-op Information and Guidelines    You will be called after 2 p.m. the day before your surgery (Friday for Monday surgery) and notified of your time for arrival and approximate surgery time.  If you have not received a call by 4P.M., please contact Same Day Surgery at [phone] of if outside [facility] call [phone].    Please Follow these Important Safety Guidelines:    • The morning of your procedure, take only the medications listed below with   A sip of water:_____________________________________________       ______________________________________________    • DO NOT eat or drink anything after 12:00 midnight the night before surgery  Specific instructions concerning drinking clear liquids will be discussed during  the pre-surgery instruction call the day before your surgery.    • If you take a blood thinner (ex. Plavix, Coumadin, aspirin), ask your doctor when to stop it before surgery  STOP DATE: _________________    • Only 2 visitors are allowed in patient rooms at a time  Your visitors will be asked to wait in the lobby until the admission process is complete with the exception of a parent with a child and patients in need of special assistance.    • YOU CANNOT DRIVE YOURSELF HOME  You must be accompanied by someone who will be responsible for driving you home after surgery and for your care at home.    • DO NOT chew gum, use breath mints, hard candy, or smoke the day of surgery  • DO NOT drink alcohol for at least 24 hours before your surgery  • DO NOT wear any jewelry and remove all body piercing before coming to the hospital  • DO NOT wear make-up to the hospital  • If you are having surgery on an extremity (arm/leg/foot) remove nail polish/artificial nails on the surgical side  • Clothing, glasses, contacts, dentures, and hairpieces must be removed before surgery  • Bathe the night before or the morning of your surgery and do not use powders/lotions on  skin.

## 2021-10-07 LAB — Lab: NORMAL

## 2021-10-08 ENCOUNTER — DOCUMENTATION (OUTPATIENT)
Dept: CARDIAC SURGERY | Facility: CLINIC | Age: 86
End: 2021-10-08

## 2021-10-08 NOTE — PROGRESS NOTES
Repatha injection given subq, abdomen, in office on 10/4/21.        This document has been electronically signed by ALICE Bledsoe @  On October 8, 2021 10:32 CDT

## 2021-10-11 ENCOUNTER — LAB (OUTPATIENT)
Dept: LAB | Facility: HOSPITAL | Age: 86
End: 2021-10-11

## 2021-10-11 DIAGNOSIS — Z01.818 PREOP TESTING: Primary | ICD-10-CM

## 2021-10-11 LAB — SARS-COV-2 N GENE RESP QL NAA+PROBE: NOT DETECTED

## 2021-10-11 PROCEDURE — C9803 HOPD COVID-19 SPEC COLLECT: HCPCS

## 2021-10-11 PROCEDURE — 87635 SARS-COV-2 COVID-19 AMP PRB: CPT

## 2021-10-12 ENCOUNTER — ANESTHESIA EVENT (OUTPATIENT)
Dept: PERIOP | Facility: HOSPITAL | Age: 86
End: 2021-10-12

## 2021-10-13 ENCOUNTER — ANESTHESIA (OUTPATIENT)
Dept: PERIOP | Facility: HOSPITAL | Age: 86
End: 2021-10-13

## 2021-10-13 ENCOUNTER — APPOINTMENT (OUTPATIENT)
Dept: GENERAL RADIOLOGY | Facility: HOSPITAL | Age: 86
End: 2021-10-13

## 2021-10-13 ENCOUNTER — HOSPITAL ENCOUNTER (INPATIENT)
Facility: HOSPITAL | Age: 86
LOS: 1 days | Discharge: HOME | End: 2021-10-14
Attending: THORACIC SURGERY (CARDIOTHORACIC VASCULAR SURGERY) | Admitting: THORACIC SURGERY (CARDIOTHORACIC VASCULAR SURGERY)

## 2021-10-13 DIAGNOSIS — I65.23 BILATERAL CAROTID ARTERY STENOSIS: ICD-10-CM

## 2021-10-13 LAB
ABO GROUP BLD: NORMAL
BLD GP AB SCN SERPL QL: NEGATIVE
GLUCOSE BLDC GLUCOMTR-MCNC: 139 MG/DL (ref 70–130)
Lab: NORMAL
RH BLD: POSITIVE
T&S EXPIRATION DATE: NORMAL

## 2021-10-13 PROCEDURE — C1894 INTRO/SHEATH, NON-LASER: HCPCS | Performed by: THORACIC SURGERY (CARDIOTHORACIC VASCULAR SURGERY)

## 2021-10-13 PROCEDURE — 25010000002 HEPARIN (PORCINE) PER 1000 UNITS: Performed by: NURSE ANESTHETIST, CERTIFIED REGISTERED

## 2021-10-13 PROCEDURE — 25010000002 HEPARIN (PORCINE) PER 1000 UNITS: Performed by: THORACIC SURGERY (CARDIOTHORACIC VASCULAR SURGERY)

## 2021-10-13 PROCEDURE — 037J3EZ DILATION OF LEFT COMMON CAROTID ARTERY WITH TWO INTRALUMINAL DEVICES, PERCUTANEOUS APPROACH: ICD-10-PCS | Performed by: THORACIC SURGERY (CARDIOTHORACIC VASCULAR SURGERY)

## 2021-10-13 PROCEDURE — 25010000002 KETOROLAC TROMETHAMINE PER 15 MG: Performed by: THORACIC SURGERY (CARDIOTHORACIC VASCULAR SURGERY)

## 2021-10-13 PROCEDURE — 94640 AIRWAY INHALATION TREATMENT: CPT

## 2021-10-13 PROCEDURE — 94799 UNLISTED PULMONARY SVC/PX: CPT

## 2021-10-13 PROCEDURE — C1876 STENT, NON-COA/NON-COV W/DEL: HCPCS | Performed by: THORACIC SURGERY (CARDIOTHORACIC VASCULAR SURGERY)

## 2021-10-13 PROCEDURE — 25010000002 PROTAMINE SULFATE PER 10 MG: Performed by: NURSE ANESTHETIST, CERTIFIED REGISTERED

## 2021-10-13 PROCEDURE — 86900 BLOOD TYPING SEROLOGIC ABO: CPT | Performed by: ANESTHESIOLOGY

## 2021-10-13 PROCEDURE — 25010000002 ONDANSETRON PER 1 MG: Performed by: NURSE ANESTHETIST, CERTIFIED REGISTERED

## 2021-10-13 PROCEDURE — B54BZZA ULTRASONOGRAPHY OF RIGHT LOWER EXTREMITY VEINS, GUIDANCE: ICD-10-PCS | Performed by: THORACIC SURGERY (CARDIOTHORACIC VASCULAR SURGERY)

## 2021-10-13 PROCEDURE — 25010000002 PHENYLEPHRINE 10 MG/ML SOLUTION: Performed by: NURSE ANESTHETIST, CERTIFIED REGISTERED

## 2021-10-13 PROCEDURE — B3141ZZ FLUOROSCOPY OF LEFT COMMON CAROTID ARTERY USING LOW OSMOLAR CONTRAST: ICD-10-PCS | Performed by: THORACIC SURGERY (CARDIOTHORACIC VASCULAR SURGERY)

## 2021-10-13 PROCEDURE — C1725 CATH, TRANSLUMIN NON-LASER: HCPCS | Performed by: THORACIC SURGERY (CARDIOTHORACIC VASCULAR SURGERY)

## 2021-10-13 PROCEDURE — X2AJ336 CEREBRAL EMBOLIC FILTRATION, EXTRACORPOREAL FLOW REVERSAL CIRCUIT FROM LEFT COMMON CAROTID ARTERY, PERCUTANEOUS APPROACH, NEW TECHNOLOGY GROUP 6: ICD-10-PCS | Performed by: THORACIC SURGERY (CARDIOTHORACIC VASCULAR SURGERY)

## 2021-10-13 PROCEDURE — 25010000002 METHYLPREDNISOLONE PER 125 MG: Performed by: NURSE ANESTHETIST, CERTIFIED REGISTERED

## 2021-10-13 PROCEDURE — 25010000002 DIPHENHYDRAMINE PER 50 MG: Performed by: THORACIC SURGERY (CARDIOTHORACIC VASCULAR SURGERY)

## 2021-10-13 PROCEDURE — C1769 GUIDE WIRE: HCPCS | Performed by: THORACIC SURGERY (CARDIOTHORACIC VASCULAR SURGERY)

## 2021-10-13 PROCEDURE — C1889 IMPLANT/INSERT DEVICE, NOC: HCPCS | Performed by: THORACIC SURGERY (CARDIOTHORACIC VASCULAR SURGERY)

## 2021-10-13 PROCEDURE — 76000 FLUOROSCOPY <1 HR PHYS/QHP: CPT

## 2021-10-13 PROCEDURE — 37215 TRANSCATH STENT CCA W/EPS: CPT | Performed by: THORACIC SURGERY (CARDIOTHORACIC VASCULAR SURGERY)

## 2021-10-13 PROCEDURE — 25010000002 IOPAMIDOL 61 % SOLUTION: Performed by: THORACIC SURGERY (CARDIOTHORACIC VASCULAR SURGERY)

## 2021-10-13 PROCEDURE — 86901 BLOOD TYPING SEROLOGIC RH(D): CPT | Performed by: ANESTHESIOLOGY

## 2021-10-13 PROCEDURE — B3171ZZ FLUOROSCOPY OF LEFT INTERNAL CAROTID ARTERY USING LOW OSMOLAR CONTRAST: ICD-10-PCS | Performed by: THORACIC SURGERY (CARDIOTHORACIC VASCULAR SURGERY)

## 2021-10-13 PROCEDURE — 25010000002 CEFAZOLIN PER 500 MG: Performed by: NURSE PRACTITIONER

## 2021-10-13 PROCEDURE — 82962 GLUCOSE BLOOD TEST: CPT

## 2021-10-13 PROCEDURE — 86850 RBC ANTIBODY SCREEN: CPT | Performed by: ANESTHESIOLOGY

## 2021-10-13 PROCEDURE — 37215 TRANSCATH STENT CCA W/EPS: CPT | Performed by: PHYSICIAN ASSISTANT

## 2021-10-13 PROCEDURE — B31B1ZZ FLUOROSCOPY OF LEFT EXTERNAL CAROTID ARTERY USING LOW OSMOLAR CONTRAST: ICD-10-PCS | Performed by: THORACIC SURGERY (CARDIOTHORACIC VASCULAR SURGERY)

## 2021-10-13 PROCEDURE — 25010000002 DIPHENHYDRAMINE PER 50 MG: Performed by: NURSE ANESTHETIST, CERTIFIED REGISTERED

## 2021-10-13 PROCEDURE — 25010000002 PROPOFOL 10 MG/ML EMULSION: Performed by: NURSE ANESTHETIST, CERTIFIED REGISTERED

## 2021-10-13 PROCEDURE — 25010000002 FENTANYL CITRATE (PF) 50 MCG/ML SOLUTION: Performed by: NURSE ANESTHETIST, CERTIFIED REGISTERED

## 2021-10-13 DEVICE — 10 MM X 40 MM
Type: IMPLANTABLE DEVICE | Site: CAROTID | Status: FUNCTIONAL
Brand: ENROUTE TRANSCAROTID STENT

## 2021-10-13 DEVICE — HEMOST ABS SURGIFOAM 8X6.25CM 10MM: Type: IMPLANTABLE DEVICE | Site: CAROTID | Status: FUNCTIONAL

## 2021-10-13 DEVICE — 10 MM X 30 MM
Type: IMPLANTABLE DEVICE | Site: CAROTID | Status: FUNCTIONAL
Brand: ENROUTE TRANSCAROTID STENT

## 2021-10-13 DEVICE — HEMOST ABS SURGICEL SNOW 1X2IN: Type: IMPLANTABLE DEVICE | Site: CAROTID | Status: FUNCTIONAL

## 2021-10-13 RX ORDER — ONDANSETRON 2 MG/ML
4 INJECTION INTRAMUSCULAR; INTRAVENOUS EVERY 6 HOURS PRN
Status: DISCONTINUED | OUTPATIENT
Start: 2021-10-13 | End: 2021-10-14 | Stop reason: HOSPADM

## 2021-10-13 RX ORDER — ROCURONIUM BROMIDE 10 MG/ML
INJECTION, SOLUTION INTRAVENOUS AS NEEDED
Status: DISCONTINUED | OUTPATIENT
Start: 2021-10-13 | End: 2021-10-13 | Stop reason: SURG

## 2021-10-13 RX ORDER — ASPIRIN 81 MG/1
81 TABLET ORAL DAILY
Status: DISCONTINUED | OUTPATIENT
Start: 2021-10-14 | End: 2021-10-14 | Stop reason: HOSPADM

## 2021-10-13 RX ORDER — DIPHENHYDRAMINE HYDROCHLORIDE 50 MG/ML
INJECTION INTRAMUSCULAR; INTRAVENOUS AS NEEDED
Status: DISCONTINUED | OUTPATIENT
Start: 2021-10-13 | End: 2021-10-13 | Stop reason: SURG

## 2021-10-13 RX ORDER — ONDANSETRON 2 MG/ML
4 INJECTION INTRAMUSCULAR; INTRAVENOUS ONCE AS NEEDED
Status: COMPLETED | OUTPATIENT
Start: 2021-10-13 | End: 2021-10-13

## 2021-10-13 RX ORDER — NITROGLYCERIN 20 MG/100ML
5-200 INJECTION INTRAVENOUS
Status: DISCONTINUED | OUTPATIENT
Start: 2021-10-13 | End: 2021-10-13 | Stop reason: HOSPADM

## 2021-10-13 RX ORDER — PROTAMINE SULFATE 10 MG/ML
INJECTION, SOLUTION INTRAVENOUS AS NEEDED
Status: DISCONTINUED | OUTPATIENT
Start: 2021-10-13 | End: 2021-10-13 | Stop reason: SURG

## 2021-10-13 RX ORDER — CETIRIZINE HYDROCHLORIDE 10 MG/1
10 TABLET ORAL DAILY
Status: DISCONTINUED | OUTPATIENT
Start: 2021-10-13 | End: 2021-10-14 | Stop reason: HOSPADM

## 2021-10-13 RX ORDER — BISACODYL 10 MG
10 SUPPOSITORY, RECTAL RECTAL DAILY PRN
Status: DISCONTINUED | OUTPATIENT
Start: 2021-10-13 | End: 2021-10-14 | Stop reason: HOSPADM

## 2021-10-13 RX ORDER — FENTANYL CITRATE 50 UG/ML
INJECTION, SOLUTION INTRAMUSCULAR; INTRAVENOUS AS NEEDED
Status: DISCONTINUED | OUTPATIENT
Start: 2021-10-13 | End: 2021-10-13 | Stop reason: SURG

## 2021-10-13 RX ORDER — ALBUTEROL SULFATE 2.5 MG/3ML
2.5 SOLUTION RESPIRATORY (INHALATION)
Status: DISCONTINUED | OUTPATIENT
Start: 2021-10-13 | End: 2021-10-14 | Stop reason: HOSPADM

## 2021-10-13 RX ORDER — CEFAZOLIN SODIUM 1 G/3ML
INJECTION, POWDER, FOR SOLUTION INTRAMUSCULAR; INTRAVENOUS AS NEEDED
Status: DISCONTINUED | OUTPATIENT
Start: 2021-10-13 | End: 2021-10-13

## 2021-10-13 RX ORDER — PROPOFOL 10 MG/ML
VIAL (ML) INTRAVENOUS AS NEEDED
Status: DISCONTINUED | OUTPATIENT
Start: 2021-10-13 | End: 2021-10-13 | Stop reason: SURG

## 2021-10-13 RX ORDER — CLOPIDOGREL BISULFATE 75 MG/1
75 TABLET ORAL DAILY
Status: DISCONTINUED | OUTPATIENT
Start: 2021-10-14 | End: 2021-10-14 | Stop reason: HOSPADM

## 2021-10-13 RX ORDER — PHENYLEPHRINE HYDROCHLORIDE 10 MG/ML
INJECTION INTRAVENOUS AS NEEDED
Status: DISCONTINUED | OUTPATIENT
Start: 2021-10-13 | End: 2021-10-13 | Stop reason: SURG

## 2021-10-13 RX ORDER — AMOXICILLIN 250 MG
2 CAPSULE ORAL 2 TIMES DAILY PRN
Status: DISCONTINUED | OUTPATIENT
Start: 2021-10-13 | End: 2021-10-14 | Stop reason: HOSPADM

## 2021-10-13 RX ORDER — ACETAMINOPHEN 325 MG/1
650 TABLET ORAL EVERY 4 HOURS PRN
Status: DISCONTINUED | OUTPATIENT
Start: 2021-10-13 | End: 2021-10-14 | Stop reason: HOSPADM

## 2021-10-13 RX ORDER — BUPIVACAINE HCL/0.9 % NACL/PF 0.1 %
2 PLASTIC BAG, INJECTION (ML) EPIDURAL ONCE
Status: COMPLETED | OUTPATIENT
Start: 2021-10-13 | End: 2021-10-13

## 2021-10-13 RX ORDER — CLINDAMYCIN PHOSPHATE 900 MG/50ML
900 INJECTION INTRAVENOUS EVERY 8 HOURS
Status: COMPLETED | OUTPATIENT
Start: 2021-10-13 | End: 2021-10-13

## 2021-10-13 RX ORDER — VALSARTAN 160 MG/1
160 TABLET ORAL 2 TIMES DAILY
Status: DISCONTINUED | OUTPATIENT
Start: 2021-10-13 | End: 2021-10-14 | Stop reason: HOSPADM

## 2021-10-13 RX ORDER — SODIUM CHLORIDE 9 MG/ML
75 INJECTION, SOLUTION INTRAVENOUS CONTINUOUS
Status: DISCONTINUED | OUTPATIENT
Start: 2021-10-13 | End: 2021-10-14 | Stop reason: HOSPADM

## 2021-10-13 RX ORDER — SODIUM CHLORIDE, SODIUM GLUCONATE, SODIUM ACETATE, POTASSIUM CHLORIDE AND MAGNESIUM CHLORIDE 526; 502; 368; 37; 30 MG/100ML; MG/100ML; MG/100ML; MG/100ML; MG/100ML
INJECTION, SOLUTION INTRAVENOUS CONTINUOUS PRN
Status: DISCONTINUED | OUTPATIENT
Start: 2021-10-13 | End: 2021-10-13 | Stop reason: SURG

## 2021-10-13 RX ORDER — METHYLPREDNISOLONE SODIUM SUCCINATE 125 MG/2ML
INJECTION, POWDER, LYOPHILIZED, FOR SOLUTION INTRAMUSCULAR; INTRAVENOUS AS NEEDED
Status: DISCONTINUED | OUTPATIENT
Start: 2021-10-13 | End: 2021-10-13 | Stop reason: SURG

## 2021-10-13 RX ORDER — HEPARIN SODIUM 5000 [USP'U]/ML
INJECTION, SOLUTION INTRAVENOUS; SUBCUTANEOUS AS NEEDED
Status: DISCONTINUED | OUTPATIENT
Start: 2021-10-13 | End: 2021-10-13 | Stop reason: HOSPADM

## 2021-10-13 RX ORDER — KETOROLAC TROMETHAMINE 15 MG/ML
15 INJECTION, SOLUTION INTRAMUSCULAR; INTRAVENOUS ONCE
Status: COMPLETED | OUTPATIENT
Start: 2021-10-13 | End: 2021-10-13

## 2021-10-13 RX ORDER — EPHEDRINE SULFATE 50 MG/ML
INJECTION, SOLUTION INTRAVENOUS AS NEEDED
Status: DISCONTINUED | OUTPATIENT
Start: 2021-10-13 | End: 2021-10-13 | Stop reason: SURG

## 2021-10-13 RX ORDER — HEPARIN SODIUM 5000 [USP'U]/ML
INJECTION, SOLUTION INTRAVENOUS; SUBCUTANEOUS AS NEEDED
Status: DISCONTINUED | OUTPATIENT
Start: 2021-10-13 | End: 2021-10-13 | Stop reason: SURG

## 2021-10-13 RX ORDER — HYDROCODONE BITARTRATE AND ACETAMINOPHEN 5; 325 MG/1; MG/1
1 TABLET ORAL EVERY 4 HOURS PRN
Status: DISCONTINUED | OUTPATIENT
Start: 2021-10-13 | End: 2021-10-14 | Stop reason: HOSPADM

## 2021-10-13 RX ORDER — ISOSORBIDE MONONITRATE 30 MG/1
30 TABLET, EXTENDED RELEASE ORAL DAILY
Status: DISCONTINUED | OUTPATIENT
Start: 2021-10-14 | End: 2021-10-14 | Stop reason: HOSPADM

## 2021-10-13 RX ORDER — DIPHENHYDRAMINE HYDROCHLORIDE 50 MG/ML
25 INJECTION INTRAMUSCULAR; INTRAVENOUS ONCE AS NEEDED
Status: COMPLETED | OUTPATIENT
Start: 2021-10-13 | End: 2021-10-13

## 2021-10-13 RX ORDER — NITROGLYCERIN 20 MG/100ML
5-200 INJECTION INTRAVENOUS
Status: DISCONTINUED | OUTPATIENT
Start: 2021-10-13 | End: 2021-10-14 | Stop reason: HOSPADM

## 2021-10-13 RX ORDER — ONDANSETRON 4 MG/1
4 TABLET, FILM COATED ORAL EVERY 6 HOURS PRN
Status: DISCONTINUED | OUTPATIENT
Start: 2021-10-13 | End: 2021-10-14 | Stop reason: HOSPADM

## 2021-10-13 RX ORDER — HYDRALAZINE HYDROCHLORIDE 50 MG/1
50 TABLET, FILM COATED ORAL 3 TIMES DAILY
Status: DISCONTINUED | OUTPATIENT
Start: 2021-10-13 | End: 2021-10-14 | Stop reason: HOSPADM

## 2021-10-13 RX ORDER — SODIUM CHLORIDE 9 MG/ML
100 INJECTION, SOLUTION INTRAVENOUS CONTINUOUS
Status: DISCONTINUED | OUTPATIENT
Start: 2021-10-13 | End: 2021-10-13 | Stop reason: HOSPADM

## 2021-10-13 RX ORDER — SODIUM BICARBONATE 325 MG/1
325 TABLET ORAL
Status: DISCONTINUED | OUTPATIENT
Start: 2021-10-14 | End: 2021-10-14 | Stop reason: HOSPADM

## 2021-10-13 RX ORDER — ONDANSETRON 2 MG/ML
INJECTION INTRAMUSCULAR; INTRAVENOUS AS NEEDED
Status: DISCONTINUED | OUTPATIENT
Start: 2021-10-13 | End: 2021-10-13 | Stop reason: SURG

## 2021-10-13 RX ADMIN — PROPOFOL 10 MG: 10 INJECTION, EMULSION INTRAVENOUS at 08:41

## 2021-10-13 RX ADMIN — Medication 2 G: at 07:37

## 2021-10-13 RX ADMIN — ONDANSETRON 4 MG: 2 INJECTION INTRAMUSCULAR; INTRAVENOUS at 10:11

## 2021-10-13 RX ADMIN — PROTAMINE SULFATE 20 MG: 10 INJECTION, SOLUTION INTRAVENOUS at 08:52

## 2021-10-13 RX ADMIN — PROPOFOL 50 MG: 10 INJECTION, EMULSION INTRAVENOUS at 07:31

## 2021-10-13 RX ADMIN — KETOROLAC TROMETHAMINE 15 MG: 15 INJECTION, SOLUTION INTRAMUSCULAR; INTRAVENOUS at 10:21

## 2021-10-13 RX ADMIN — FENTANYL CITRATE 25 MCG: 50 INJECTION INTRAMUSCULAR; INTRAVENOUS at 07:31

## 2021-10-13 RX ADMIN — SODIUM CHLORIDE 100 ML/HR: 9 INJECTION, SOLUTION INTRAVENOUS at 06:41

## 2021-10-13 RX ADMIN — GLYCOPYRROLATE 0.2 MG: 0.2 INJECTION, SOLUTION INTRAMUSCULAR; INTRAVITREAL at 07:55

## 2021-10-13 RX ADMIN — PROPOFOL 10 MG: 10 INJECTION, EMULSION INTRAVENOUS at 08:33

## 2021-10-13 RX ADMIN — METHYLPREDNISOLONE SODIUM SUCCINATE 100 MG: 125 INJECTION, POWDER, FOR SOLUTION INTRAMUSCULAR; INTRAVENOUS at 08:00

## 2021-10-13 RX ADMIN — EPHEDRINE SULFATE 10 MG: 50 INJECTION INTRAVENOUS at 07:58

## 2021-10-13 RX ADMIN — HEPARIN SODIUM 6000 UNITS: 5000 INJECTION INTRAVENOUS; SUBCUTANEOUS at 08:16

## 2021-10-13 RX ADMIN — EPHEDRINE SULFATE 10 MG: 50 INJECTION INTRAVENOUS at 08:38

## 2021-10-13 RX ADMIN — DIPHENHYDRAMINE HYDROCHLORIDE 25 MG: 50 INJECTION INTRAMUSCULAR; INTRAVENOUS at 08:00

## 2021-10-13 RX ADMIN — PROPOFOL 20 MG: 10 INJECTION, EMULSION INTRAVENOUS at 07:33

## 2021-10-13 RX ADMIN — ALBUTEROL SULFATE 2.5 MG: 2.5 SOLUTION RESPIRATORY (INHALATION) at 14:01

## 2021-10-13 RX ADMIN — EPHEDRINE SULFATE 5 MG: 50 INJECTION INTRAVENOUS at 08:30

## 2021-10-13 RX ADMIN — ONDANSETRON 4 MG: 2 INJECTION INTRAMUSCULAR; INTRAVENOUS at 09:01

## 2021-10-13 RX ADMIN — FENTANYL CITRATE 25 MCG: 50 INJECTION INTRAMUSCULAR; INTRAVENOUS at 07:28

## 2021-10-13 RX ADMIN — CLINDAMYCIN PHOSPHATE 900 MG: 900 INJECTION, SOLUTION INTRAVENOUS at 22:02

## 2021-10-13 RX ADMIN — PHENYLEPHRINE HYDROCHLORIDE 50 MCG: 10 INJECTION INTRAVENOUS at 08:46

## 2021-10-13 RX ADMIN — PROPOFOL 10 MG: 10 INJECTION, EMULSION INTRAVENOUS at 08:42

## 2021-10-13 RX ADMIN — FENTANYL CITRATE 25 MCG: 50 INJECTION INTRAMUSCULAR; INTRAVENOUS at 07:50

## 2021-10-13 RX ADMIN — ALBUTEROL SULFATE 2.5 MG: 2.5 SOLUTION RESPIRATORY (INHALATION) at 22:52

## 2021-10-13 RX ADMIN — SODIUM CHLORIDE 100 ML/HR: 9 INJECTION, SOLUTION INTRAVENOUS at 09:45

## 2021-10-13 RX ADMIN — SODIUM CHLORIDE, SODIUM GLUCONATE, SODIUM ACETATE, POTASSIUM CHLORIDE AND MAGNESIUM CHLORIDE: 526; 502; 368; 37; 30 INJECTION, SOLUTION INTRAVENOUS at 07:49

## 2021-10-13 RX ADMIN — PHENYLEPHRINE HYDROCHLORIDE 100 MCG: 10 INJECTION INTRAVENOUS at 08:35

## 2021-10-13 RX ADMIN — CLINDAMYCIN PHOSPHATE 900 MG: 900 INJECTION, SOLUTION INTRAVENOUS at 14:50

## 2021-10-13 RX ADMIN — NITROGLYCERIN 5 MCG/MIN: 20 INJECTION INTRAVENOUS at 10:09

## 2021-10-13 RX ADMIN — PROPOFOL 10 MG: 10 INJECTION, EMULSION INTRAVENOUS at 08:43

## 2021-10-13 RX ADMIN — DIPHENHYDRAMINE HYDROCHLORIDE 25 MG: 50 INJECTION INTRAMUSCULAR; INTRAVENOUS at 22:02

## 2021-10-13 RX ADMIN — CETIRIZINE HYDROCHLORIDE 10 MG: 10 TABLET, FILM COATED ORAL at 11:38

## 2021-10-13 RX ADMIN — PROPOFOL 20 MG: 10 INJECTION, EMULSION INTRAVENOUS at 08:56

## 2021-10-13 RX ADMIN — SUGAMMADEX 200 MG: 100 INJECTION, SOLUTION INTRAVENOUS at 09:10

## 2021-10-13 RX ADMIN — GLYCOPYRROLATE 0.2 MG: 0.2 INJECTION, SOLUTION INTRAMUSCULAR; INTRAVITREAL at 07:40

## 2021-10-13 RX ADMIN — PHENYLEPHRINE HYDROCHLORIDE 50 MCG: 10 INJECTION INTRAVENOUS at 08:30

## 2021-10-13 RX ADMIN — PROPOFOL 30 MG: 10 INJECTION, EMULSION INTRAVENOUS at 07:32

## 2021-10-13 RX ADMIN — EPHEDRINE SULFATE 10 MG: 50 INJECTION INTRAVENOUS at 08:35

## 2021-10-13 RX ADMIN — ROCURONIUM BROMIDE 50 MG: 10 INJECTION INTRAVENOUS at 07:31

## 2021-10-13 NOTE — OP NOTE
OPERATIVE NOTE  Minerva Madird  1935  10/13/2021    PREOP DIAGNOSES:    LEFT carotid artery stenosis, asymptomatic  High risk criteria:  Age >= 75, surgically inaccessible lesion, restenosis prior CEA and severe pulmonary disease  SVS VQI VQI-TSP:  [id number]    POSTOP DIAGNOSES:   LEFT carotid artery stenosis    PROCEDURE:   LEFT Transcatheter placement cervical carotid stent open with distal embolic protection  (TCAR, [id number], [id number] EnRoute stent, 5x30mm Jabari balloon predilation, 5.5x30mm Jabari balloon post dilation, EnRoute NPS flow reversal system)  Select LEFT carotid cerebral arteriogram  Vascular ultrasound guidance (femoral venous cannulation)    SURGEON: DEMETRA Kong MD FACS RPVI    ASSISTANT: Troy Hyde CFA  provided critical assistance during the case including suctioning, exposure, retraction, and reduction of blood loss.    ANESTHESIA: GEN    CONTRAST:  20ml isovue    MEDICATIONS: 8000 units heparin, 0.4 mg glycopyrrolate    FLUORO TIME:  4.4min    FLOW REVERSAL TIME:  16min    ESTIMATED BLOOD LOSS: Minimal    SPECIMEN:  None    COMPLICATIONS: none    DESCRIPTION OF OPERATION:   Location: bifurcation, ICA and CCA  Lesion type:  atherosclerosis  Lesion circumference calcification:  20%  Lesion length:  37mm  Degree of stenosis:  90%  ICA tortuosity:  moderate  Aortic Arch: type 2  Aortic Arch calcification:  moderate    Antiplatelet, statin confirmed.    Patient taken to the operating room, placed in supine position, neck extended.  Vascular ultrasound used to identify the RIGHT common femoral vein patent 9mm in diameter no plaque, cannulated via modified Seldinger technique with mini stick under ultrasound guidance, imaging saved. wire and catheter exchanged for 8Fr Enroute NPS venous sheath. Secured to skin.    Vascular ultrasound used to identify carotid artery, CCA >=6mm, no significant plaque at puncture site, bifurcation marked on skin.  transverse incision made base  of LEFT neck. 8000 units IV heparin given to maintain ACT around 300.  Dissection down to common carotid artery, 3cm exposed, controlled with vessel loop.  5-0 Prolene pursestring suture placed anterior CCA.  Traction on CCA loop. CCA cannulated via modified Seldinger technique with micropuncture needle, exchanged 4Fr catheter.  Carotid cerebral arteriogram performed.    LEFT common carotid artery:  patent with mild irregularities  LEFT internal carotid artery:  with focal stenosis 90%  LEFT external carotid artery:  with focal stenosis 60%  No significant intracranial stenoses visualized.    0.035 J-tip extra support guidewire placed, stopped short of bifurcation. Exchanged 8Fr Enroute sheath.  Distal sheath location in central lumen confirmed with AP and oblique contrast injections. Flow reversal established with Enroute NPS system.  Proximal CCA occluded with profunda clamp. Exchanged 0.014 SR guidewire, lesion crossed with mild difficulty, tip at siphon turn.     Exchanged 5x30mm Adam balloon placed across carotid artery lesion for predilation, inflated to full effacement 6atm with inflator. Reverse flow x2min.  Exchanged 9x40mm Enroute stent deployed across lesion without difficulty.  Reverse flow x2min. Completion arteriogram shows no significant residual stenosis.      Exchanged 5.5x30mm adam balloon placed across carotid artery lesion for postdilation, inflated to full effacement, 6atm with inflator.  Reverse flow x 2min. Completion arteriogram shows 20% residual stenosis. No evidence obstruction or extravasation. Moderate common carotid artery dissection at proximal end of stent. Exchanged [id number] Enroute stent deployed across the dissection lesion proximal CCA.  Completion arteriogram shows no evidence obstruction dissection or extravasation.    Wire, catheter removed. CCA clamp removed, antegrade flow re-established.  Enroute NPS system disconnected, blood returned thru venous sheath.  Venous  sheath removed, manual pressure held with good hemostasis.  Arterial sheath removed, repaired with 5-0 Prolene pursestring suture.     20 mg protamine given with return of ACT to baseline.  Hemostasis obtained.  SNoW used.  Incision closed in layers interrupted 3-0 Vicryl platysma, 4-0 Monocryl skin, Dermabond tape.  Awoke from anesthesia moving all extremities, no focal neurologic deficits.  Patient tolerated procedure well, transferred to PACU in stable condition.          This document has been electronically signed by Mark Kong MD on October 13, 2021 10:08 CDT

## 2021-10-13 NOTE — ANESTHESIA PROCEDURE NOTES
Airway  Date/Time: 10/13/2021 7:33 AM  Airway not difficult    General Information and Staff    Patient location during procedure: OR  CRNA: Hannah Cuevas CRNA    Indications and Patient Condition  Indications for airway management: airway protection    Preoxygenated: yes  MILS maintained throughout  Mask difficulty assessment: 2 - vent by mask + OA or adjuvant +/- NMBA    Final Airway Details  Final airway type: endotracheal airway      Successful airway: ETT  Cuffed: yes   Successful intubation technique: direct laryngoscopy  Blade: Manohar  Blade size: 3  ETT size (mm): 7.0  Cormack-Lehane Classification: grade I - full view of glottis  Placement verified by: chest auscultation, capnometry and palpation of cuff   Cuff volume (mL): 6  Measured from: teeth  ETT/EBT  to teeth (cm): 20  Number of attempts at approach: 1  Assessment: lips, teeth, and gum same as pre-op and atraumatic intubation    Additional Comments  Inserted per COSMO GREEN

## 2021-10-13 NOTE — ANESTHESIA POSTPROCEDURE EVALUATION
Patient: Minerva Madrid    Procedure Summary     Date: 10/13/21 Room / Location: [facility] OR 03 / [facility] OR    Anesthesia Start: 0707 Anesthesia Stop: 0935    Procedure: TRANSCAROTID ARTERY REVASCULARIZATION, CAROTID  CEREBRAL ARTERIOGRAM , CAROTID STENT, POSSIBLE CAROTID ENDARTERECTOMY            (C-ARM#2 AND C-ARM TABLE) (Left Neck) Diagnosis:       Bilateral carotid artery stenosis      (Bilateral carotid artery stenosis [I65.23])    Surgeons: Mark Kong MD Provider: Benson Mix MD    Anesthesia Type: general ASA Status: 4          Anesthesia Type: general    Vitals  No vitals data found for the desired time range.          Post Anesthesia Care and Evaluation    Patient location during evaluation: PACU  Patient participation: complete - patient participated  Level of consciousness: sleepy but conscious  Pain score: 0  Pain management: adequate  Airway patency: patent  Anesthetic complications: No anesthetic complications  PONV Status: none  Cardiovascular status: acceptable and hemodynamically stable  Respiratory status: acceptable and face mask  Hydration status: acceptable    Comments: 160/50  89  16  100  97.2  No anesthesia care post op

## 2021-10-13 NOTE — ANESTHESIA PROCEDURE NOTES
Peripheral IV    Patient location during procedure: OR  Line placed for Difficult Access.  Performed By   CRNA: Hannah Cuevas CRNA  Preanesthetic Checklist  Completed: patient identified, IV checked, site marked, risks and benefits discussed, surgical consent, monitors and equipment checked, pre-op evaluation and timeout performed  Peripheral IV Prep   Patient position: supine   Prep: ChloraPrep  Peripheral IV Procedure   Laterality:left  Location:  Wrist  Catheter size: 20 G         Post Assessment   Dressing Type: transparent and tape.    IV Dressing/Site: clean, dry and intact  Additional Notes  Inserted per Dr. Mix

## 2021-10-13 NOTE — ANESTHESIA PROCEDURE NOTES
Arterial Line      Patient reassessed immediately prior to procedure    Patient location during procedure: OR  Start time: 10/13/2021 7:20 AM  Stop Time:10/13/2021 7:25 AM       Line placed for hemodynamic monitoring and ABGs/Labs/ISTAT.  Performed By   CRNA: Hannah Cuevas CRNA  Preanesthetic Checklist  Completed: patient identified, IV checked, site marked, risks and benefits discussed, surgical consent, monitors and equipment checked, pre-op evaluation and timeout performed  Arterial Line Prep   Sterile Tech: gloves  Prep: ChloraPrep  Patient monitoring: blood pressure monitoring, continuous pulse oximetry and EKG  Arterial Line Procedure   Laterality:right  Location:  radial artery  Catheter size: 20 G   Guidance: ultrasound guided  PROCEDURE NOTE/ULTRASOUND INTERPRETATION.  Using ultrasound guidance the potential vascular sites for insertion of the catheter were visualized to determine the patency of the vessel to be used for vascular access.  After selecting the appropriate site for insertion, the needle was visualized under ultrasound being inserted into the radial artery, followed by ultrasound confirmation of wire and catheter placement. There were no abnormalities seen on ultrasound; an image was taken; and the patient tolerated the procedure with no complications.   Number of attempts: 1  Successful placement: yes  Post Assessment   Dressing Type: occlusive dressing applied, secured with tape and wrist guard applied.   Complications no  Circ/Move/Sens Assessment: normal.   Patient Tolerance: patient tolerated the procedure well with no apparent complications  Additional Notes  Inserted Per COS[employer] GREEN

## 2021-10-13 NOTE — NURSING NOTE
Patient has an old healing wound on her coccyx. It measures 0.8 x 0.2  X 0.1 cm Wound bed granulated Callus formation around edges. Needs wound care, protection and offloading. POA yes

## 2021-10-13 NOTE — ANESTHESIA PREPROCEDURE EVALUATION
Anesthesia Evaluation     no history of anesthetic complications:  NPO Solid Status: > 8 hours  NPO Liquid Status: > 2 hours           Airway   Mallampati: III  TM distance: <3 FB  Neck ROM: limited  Difficult intubation highly probable and Small opening  Dental    (+) poor dentition and upper dentures        Pulmonary    (+) a smoker (1 ppd) Current Abstained day of surgery, decreased breath sounds,   (-) COPD, asthma, sleep apnea  Cardiovascular   Exercise tolerance: poor (<4 METS)    ECG reviewed  PT is on anticoagulation therapy  Patient on routine beta blocker and Beta blocker given within 24 hours of surgery  Rhythm: regular    (+) hypertension poorly controlled 2 medications or greater, valvular problems/murmurs MR, CAD, CABG (12 yrs ago) >6 Months, murmur, hyperlipidemia,  carotid artery disease carotid bilateral  (-) past MI, dysrhythmias, angina, cardiac stents, DVT    ROS comment: Normal sinus rhythm  Left ventricular hypertrophy with repolarization abnormality  Abnormal ECG  When compared with ECG of 27-OCT-2018 16:12,  Vent. rate has decreased BY  56 BPM  ST no longer depressed in Lateral leads  T wave inversion less evident in Inferior leads    10/29/18  EF 56-60%  · Left ventricular wall thickness is consistent with mild concentric hypertrophy.  · Mild to moderate tricuspid valve regurgitation is present.  · Mild mitral valve regurgitation is present        Neuro/Psych  (+) CVA (1-2 yrs ago, lost her balance and uses a walker) residual symptoms,     (-) seizures, headaches, weakness, numbness, psychiatric history  GI/Hepatic/Renal/Endo    (-) GERD, hepatitis, liver disease, no renal disease, diabetes, no thyroid disorder    Musculoskeletal     (+) arthralgias,   Abdominal    Substance History   (-) alcohol use, drug use     OB/GYN    (+) Pregnant,         Other   arthritis (hips),      (-) history of cancer  ROS/Med Hx Other: Still taking plavix    1.  Calcification and plaque formation in the  proximal left internal carotid artery resulting in approximate 90-95% stenosis.  Nonemergent carotid ultrasound suggested.  2. Mild to moderate calcification involving the proximal right cervical vertebral artery with nonvisualization of the mid to distal vertebral artery, likely chronic. Nonemergent carotid ultrasound suggested.                Anesthesia Plan    ASA 4     general   (Arterial line, 2nd IV)  intravenous induction     Anesthetic plan, all risks, benefits, and alternatives have been provided, discussed and informed consent has been obtained with: patient and child.  Use of blood products discussed with patient  Consented to blood products.

## 2021-10-13 NOTE — H&P
Minerva Madrid  1935     Chief Complaint:  Carotid stenosis     HPI:       PCP:  Ariana Adkins, DO      86y.o. female with HTN(stable, increased risk stroke, rupture), Hyperlipidemia(stable, increased risk cardiovascular events), Diabetes Mellitus(stable, increased risk cardiovascular events), Smoker(uncontrolled, increased risk cardiovascular events) and COPD(stable, increased risk pulmonary complications) , PVD(stable, increase risk cardiovascular events), Carotid Stenosis(chronic severe progression, increase risk stroke)  smokes 1/2 PPD.  Moderate leg weakness, balance problems, leg cramps at night.  Limited claudication symptoms.  Uses walker, limited ambulation.  Prior stroke, hip replacement.  No other associated signs, symptoms or modifying factors.     2/2017 IDALIA:  RIGHT .79 triphasic.  LEFT .76 triphasic.  3/2018 IDALIA:  RIGHT .88 triphasic.  LEFT .85 triphasic.  3/2019 IDALIA:  RIGHT .55 biphasic.  LEFT .62 biphasic.  6/2019 IDALIA:  RIGHT .49 tri/biphasic.  LEFT .75 tri/biphasic.  7/2019 CTA runoff:  Focal dissection distal thoracic aorta, distal aorta 10mm, circumferential calcification.  RIGHT SFA 80%, pop/tibial occlusion.  LEFT EIA 60% bifucation, 80% mid.  SFA moderate diffuse disease, 2v runoff.  10/2019 IDALIA:  RIGHT .43 tri/biphasic.  LEFT .51 biphasic.  6/2020 IDALIA:  RIGHT .66 tri/biphasic.  LEFT .63 tri/biphasic.  8/2021 IDALIA:  RIGHT .63 biphasic.  LEFT .62 biphasic.     2009 LEFT CEA  2016 Stroke  2/2017 Carotid Duplex:  DENVER 0-49% (120/18cm/s, ratio 0.9), LICA >70% (230/36cm/s, ratio 1.8) antegrade verts.  3/2018 Carotid Duplex:  DENVER 0-49% (73/19cm/s, ratio 0.7), LICA 50-69% (186/39cm/s, ratio 1.8) antegrade verts.  3/2019 Carotid Duplex:  DENVER 0-49% (111/22cm/s, ratio 0.8), LICA 50-69% (183/41cm/s, ratio 1.9) antegrade verts.  8/2021 Carotid Duplex:  DENVER 0-49% (123/14cm/s, ratio 1.0), LICA 50-69% (293/63cm/s, ratio 3.4) antegrade verts.  8/2021 CTA Carotids:  DENVER no significant obstruction.   LICA 90%     10/2018 ECG:  , QTc 440  10/2018 Echocardiogram:  EF 55%, LA 34mm, LV 33mm, RVSP 33mmHg.   Mild MR TR.     The following portions of the patient's history were reviewed and updated as appropriate: allergies, current medications, past family history, past medical history, past social history, past surgical history and problem list.  Recent images independently reviewed.  Available laboratory values reviewed.     PMH:       Past Medical History:   Diagnosis Date   • Appetite loss     • Arthritis     • Coughing     • Diabetes mellitus (CMS/HCC)     • Essential hypertension     • Leg pain     • Muscle weakness     • Postmenopausal bleeding       With thickened endometrial stripe    • Stroke (CMS/HCC) 02/2016            Past Surgical History:   Procedure Laterality Date   • CARDIAC SURGERY       • CAROTID ENDARTERECTOMY       • JOINT REPLACEMENT Left 01/2017     partial hip replacement   • MASTOID SURGERY                Family History   Problem Relation Age of Onset   • No Known Problems Mother     • No Known Problems Father        Social History            Tobacco Use   • Smoking status: Current Every Day Smoker       Packs/day: 1.00       Years: 56.00       Pack years: 56.00       Types: Cigarettes   • Smokeless tobacco: Never Used   Substance Use Topics   • Alcohol use: No   • Drug use: No         ALLERGIES:       Allergies   Allergen Reactions   • Amoxicillin     • Erythromycin Base     • Iodine     • Shellfish-Derived Products     • Simvastatin Myalgia            MEDICATIONS:     Current Outpatient Medications:   •  aspirin 81 MG tablet, Take 81 mg by mouth Daily., Disp: , Rfl:   •  CloNIDine (CATAPRES) 0.2 MG tablet, TAKE 1 TABLET BY MOUTH TWICE A DAY, Disp: , Rfl: 0  •  clopidogrel (PLAVIX) 75 MG tablet, Take 75 mg by mouth Daily., Disp: , Rfl:   •  hydrALAZINE (APRESOLINE) 25 MG tablet, Take 25 mg by mouth 3 (Three) Times a Day., Disp: , Rfl:   •  isosorbide mononitrate (IMDUR) 30 MG 24 hr  "tablet, Take 30 mg by mouth Daily., Disp: , Rfl:   •  Loratadine 10 MG capsule, Take 10 mg by mouth Daily As Needed. Allergy relief, Disp: , Rfl:   •  METOPROLOL TARTRATE PO, Take 50 mg by mouth 2 (Two) Times a Day., Disp: , Rfl:   •  Omega-3 Fatty Acids (OMEGA-3 FISH OIL) 1000 MG capsule, Take 1,000 mg by mouth Daily., Disp: , Rfl:   •  predniSONE (DELTASONE) 50 MG tablet, Take 1 tablet by mouth Every 6 (Six) Hours. Take 13 hours, 7 hours, 1 hour prior to contrast adminstration, Disp: 3 tablet, Rfl: 0  •  sodium chloride 1 g tablet, 1 table by mouth 4 times a week, Disp: , Rfl: 2  •  valsartan (DIOVAN) 160 MG tablet, Take 1 tablet by mouth Daily. (Patient taking differently: Take 160 mg by mouth 2 (Two) Times a Day.), Disp: 30 tablet, Rfl: 0  •  vitamin B-12 (CYANOCOBALAMIN) 1000 MCG tablet, Place 1,000 mcg under the tongue Daily., Disp: , Rfl:      Review of Systems   Review of Systems   Constitutional: Positive for malaise/fatigue. Negative for weight loss.   HENT: Positive for hearing loss.    Cardiovascular: Positive for dyspnea on exertion. Negative for chest pain and claudication.   Respiratory: Negative for cough and shortness of breath.    Hematologic/Lymphatic: Bruises/bleeds easily.   Skin: Negative for color change and poor wound healing.   Musculoskeletal: Positive for back pain, falls, muscle cramps and stiffness.   Neurological: Positive for dizziness, loss of balance, numbness and paresthesias. Negative for weakness.         Physical Exam       Vitals:     09/02/21 1357   BP: 148/88   BP Location: Left arm   Pulse: 57   Temp: 97.5 °F (36.4 °C)   TempSrc: Infrared   SpO2: 98%   Height: 157.5 cm (62\")      Body surface area is 1.51 meters squared.  Body mass index is 20.85 kg/m².  Physical Exam  Constitutional:       General: She is not in acute distress.     Appearance: She is not ill-appearing.   HENT:      Right Ear: Hearing normal.      Left Ear: Hearing normal.      Nose: No nasal deformity.      " Mouth/Throat:      Dentition: Normal dentition. Does not have dentures.   Cardiovascular:      Rate and Rhythm: Normal rate and regular rhythm.      Pulses:           Carotid pulses are 2+ on the right side and 2+ on the left side.       Radial pulses are 2+ on the right side and 2+ on the left side.        Posterior tibial pulses are 1+ on the right side and 1+ on the left side.      Heart sounds: No murmur heard.     Pulmonary:      Effort: Pulmonary effort is normal.      Breath sounds: Normal breath sounds.   Abdominal:      General: There is no distension.      Palpations: Abdomen is soft. There is no mass.      Tenderness: There is no abdominal tenderness.   Musculoskeletal:         General: No deformity.      Comments: Gait normal.    Skin:     General: Skin is warm and dry.      Coloration: Skin is not pale.      Findings: No erythema.      Comments: No venous staining   Neurological:      Mental Status: She is alert and oriented to person, place, and time.   Psychiatric:         Speech: Speech normal.         Behavior: Behavior is cooperative.         Thought Content: Thought content normal.         Judgment: Judgment normal.                  BUN   Date Value Ref Range Status   08/26/2021 16 8 - 23 mg/dL Final            Creatinine   Date Value Ref Range Status   08/26/2021 0.76 0.57 - 1.00 mg/dL Final            eGFR Non  Amer   Date Value Ref Range Status   08/26/2021 72 >60 mL/min/1.73 Final         ASSESSMENT:  Diagnoses and all orders for this visit:     1. Bilateral carotid artery stenosis (Primary)     2. Cerebrovascular accident (CVA), unspecified mechanism (HCC)     3. PVD (peripheral vascular disease) (CMS/HCC)     4. Nicotine dependence, cigarettes, with other nicotine-induced disorders     5. Mixed hyperlipidemia     6. Essential hypertension     7. Coronary artery disease involving native coronary artery of native heart without angina pectoris     PLAN:  Detailed discussion with Minerva  Miles Madrid regarding situation, options and plans.  severe,  symptomatic numbness, weakness, stroke and dizziness carotid stenosis.  Carotid intervention is advisable.  Increased risk for Stroke and cardiovascular complications.  Carotid stenting is advisable.     Risks, including but not limited to:  Mortality, major morbidity 1%.  Stroke risk 2-3%.  bleeding, transfusion, infection, pulmonary, renal dysfunction, blood vessel and nerve injury.  Benefits:  reduction of stroke risk  Options: carotid endarterectomy, stenting and medical therapy discussed.   usually overnight stay in hospital if all well. Understands and wishes to proceed.     LEFT Transcarotid artery revascularization, carotid cerebral arteriogram, carotid stent, possible carotid endarterectomy, radial arterial line, GEN  SDS  10/13/2021   Repatha periprocedural     Return after above studies complete  Smoking cessation advised and assistance options offered including behavioral counseling ([facility] [facility]), Nicotine replacement therapy (patches or gum), pharmacologic therapy (Chantix, Wellbutrin). patient understands that continued use of tobacco products increases risk of heart disease, stroke, cancer; counseling for 3-5min.     Recommended regular physical activity, progressive walking program.  Continue current medications as directed.     Advance Care Planning     ACP discussion was declined by the patient. Patient does not have an advance directive, declines further assistance.     Thank you for the opportunity to participate in this patient's care.     Copy to primary care provider.        This document has been electronically signed by Mark Kong MD on October 13, 2021 06:52 CDT

## 2021-10-14 VITALS
WEIGHT: 114.7 LBS | BODY MASS INDEX: 20.32 KG/M2 | SYSTOLIC BLOOD PRESSURE: 146 MMHG | OXYGEN SATURATION: 97 % | DIASTOLIC BLOOD PRESSURE: 63 MMHG | HEART RATE: 63 BPM | HEIGHT: 63 IN | TEMPERATURE: 98.9 F | RESPIRATION RATE: 18 BRPM

## 2021-10-14 PROCEDURE — 94799 UNLISTED PULMONARY SVC/PX: CPT

## 2021-10-14 PROCEDURE — 99024 POSTOP FOLLOW-UP VISIT: CPT | Performed by: NURSE PRACTITIONER

## 2021-10-14 PROCEDURE — 94760 N-INVAS EAR/PLS OXIMETRY 1: CPT

## 2021-10-14 PROCEDURE — 25010000002 HYDRALAZINE PER 20 MG: Performed by: NURSE PRACTITIONER

## 2021-10-14 RX ORDER — FUROSEMIDE 20 MG/1
20 TABLET ORAL ONCE
Status: COMPLETED | OUTPATIENT
Start: 2021-10-14 | End: 2021-10-14

## 2021-10-14 RX ORDER — FUROSEMIDE 10 MG/ML
20 INJECTION INTRAMUSCULAR; INTRAVENOUS ONCE
Status: DISCONTINUED | OUTPATIENT
Start: 2021-10-14 | End: 2021-10-14

## 2021-10-14 RX ORDER — HYDRALAZINE HYDROCHLORIDE 20 MG/ML
10 INJECTION INTRAMUSCULAR; INTRAVENOUS ONCE
Status: COMPLETED | OUTPATIENT
Start: 2021-10-14 | End: 2021-10-14

## 2021-10-14 RX ADMIN — HYDRALAZINE HYDROCHLORIDE 50 MG: 50 TABLET, FILM COATED ORAL at 08:35

## 2021-10-14 RX ADMIN — ISOSORBIDE MONONITRATE 30 MG: 30 TABLET, EXTENDED RELEASE ORAL at 08:34

## 2021-10-14 RX ADMIN — VALSARTAN 160 MG: 160 TABLET, FILM COATED ORAL at 08:34

## 2021-10-14 RX ADMIN — METOPROLOL TARTRATE 25 MG: 25 TABLET, FILM COATED ORAL at 08:35

## 2021-10-14 RX ADMIN — FUROSEMIDE 20 MG: 20 TABLET ORAL at 12:14

## 2021-10-14 RX ADMIN — ASPIRIN 81 MG: 81 TABLET, FILM COATED ORAL at 08:34

## 2021-10-14 RX ADMIN — CETIRIZINE HYDROCHLORIDE 10 MG: 10 TABLET, FILM COATED ORAL at 08:35

## 2021-10-14 RX ADMIN — HYDRALAZINE HYDROCHLORIDE 10 MG: 20 INJECTION INTRAMUSCULAR; INTRAVENOUS at 09:57

## 2021-10-14 RX ADMIN — CLOPIDOGREL BISULFATE 75 MG: 75 TABLET ORAL at 08:35

## 2021-10-14 RX ADMIN — SODIUM BICARBONATE TAB 325 MG 325 MG: 325 TAB at 08:35

## 2021-10-14 RX ADMIN — ALBUTEROL SULFATE 2.5 MG: 2.5 SOLUTION RESPIRATORY (INHALATION) at 09:31

## 2021-10-14 NOTE — PLAN OF CARE
Goal Outcome Evaluation:  Plan of Care Reviewed With: patient        Progress: improving   Patient rested comfortably throughout the night. A-line and Saldivar removed without complications. Ambulated in room, now up in chair.VSS, UOP adequate, continuing to monitor.

## 2021-10-14 NOTE — DISCHARGE SUMMARY
CVTS DISCHARGE SUMMARY  Date of Admission: 10/13/2021  6:13 AM  Date of Discharge:  10/14/2021    Admission Diagnosis:   Bilateral carotid artery stenosis [I65.23]    Discharge Diagnosis:   Post-Op Diagnosis Codes:     * Bilateral carotid artery stenosis [I65.23]  Active Hospital Problems    Diagnosis  POA   • **Bilateral carotid artery stenosis [I65.23]  Yes   • Mixed hyperlipidemia [E78.2]  Yes   • PVD (peripheral vascular disease) (HCC) [I73.9]  Yes   • Chronic low back pain [M54.50, G89.29]  Yes   • Essential hypertension [I10]  Yes      Resolved Hospital Problems   No resolved problems to display.       Consults:   Consults     No orders found for last 30 day(s).          Procedures Performed  Procedure(s): LEFT  TRANSCAROTID ARTERY REVASCULARIZATION, CAROTID  CEREBRAL ARTERIOGRAM , CAROTID STENT           (C-ARM#2 AND C-ARM TABLE)       Discharge Medications     Discharge Medications      Continue These Medications      Instructions Start Date   aspirin 81 MG tablet   81 mg, Oral, Daily      clopidogrel 75 MG tablet  Commonly known as: PLAVIX   75 mg, Oral, Daily      hydrALAZINE 25 MG tablet  Commonly known as: APRESOLINE   50 mg, Oral, 3 Times Daily      isosorbide mononitrate 30 MG 24 hr tablet  Commonly known as: IMDUR   30 mg, Oral, Daily      Loratadine 10 MG capsule   10 mg, Oral, Daily PRN, Allergy relief       METOPROLOL TARTRATE PO   25 mg, Oral, 2 Times Daily      Omega-3 Fish Oil 1000 MG capsule   1,000 mg, Oral, Daily      Repatha solution prefilled syringe injection  Generic drug: Evolocumab   140 mg, Subcutaneous, Every 14 Days      sodium bicarbonate 650 MG tablet   325 mg, Oral, 4 Times Weekly, [id number]       valsartan 160 MG tablet  Commonly known as: DIOVAN   160 mg, Oral, 2 Times Daily      vitamin B-12 1000 MCG tablet  Commonly known as: CYANOCOBALAMIN   1,000 mcg, Sublingual, Daily           Medical Management: Aspirin, Plavix, Repatha     Discharge Diet:   Diet Instructions      Diet: Cardiac, Consistent Carbohydrate      Discharge Diet:  Cardiac  Consistent Carbohydrate             Discharge Disposition: Home in stable condition with follow up appointments with CVTS Nurse Practitioner 1 week, Dr. Kong 4 weeks, Cardiology/PCP as scheduled.     History of Present Illness/Hospital Course:    86y.o. female with HTN(stable, increased risk stroke, rupture), Hyperlipidemia(stable, increased risk cardiovascular events), Diabetes Mellitus(stable, increased risk cardiovascular events), Smoker(uncontrolled, increased risk cardiovascular events) and COPD(stable, increased risk pulmonary complications) , PVD(stable, increase risk cardiovascular events), Carotid Stenosis(chronic severe progression, increase risk stroke)  smokes 1/2 PPD.  Moderate leg weakness, balance problems, leg cramps at night.  Limited claudication symptoms.  Uses walker, limited ambulation.  Prior stroke, hip replacement.  No other associated signs, symptoms or modifying factors.  2009 LEFT CEA  2016 Stroke  2/2017 Carotid Duplex:  DENVER 0-49% (120/18cm/s, ratio 0.9), LICA >70% (230/36cm/s, ratio 1.8) antegrade verts.  3/2018 Carotid Duplex:  DENVER 0-49% (73/19cm/s, ratio 0.7), LICA 50-69% (186/39cm/s, ratio 1.8) antegrade verts.  3/2019 Carotid Duplex:  DENVER 0-49% (111/22cm/s, ratio 0.8), LICA 50-69% (183/41cm/s, ratio 1.9) antegrade verts.  8/2021 Carotid Duplex:  DENVER 0-49% (123/14cm/s, ratio 1.0), LICA 50-69% (293/63cm/s, ratio 3.4) antegrade verts.  8/2021 CTA Carotids:  DENVER no significant obstruction.  LICA 90%   Adequate pre-op studies were completed and the patient was scheduled electively. Operative day Minerva Madrid admitted through [facility], taken to operating suite and placed under general anesthesia.  Underwent said procedure without complications or difficulty. The patient was weaned easily from mechanical ventilation and extubated in the recovery room placed on oxygen per nasal cannula and further transferred  "to CCU for further care and monitoring. Minerva Madrid remained hemodynamically and neurovascularly stable immediately postop.  POD1 the patient was out of the bed to the chair tolerating breakfast without any difficulty swallowing.  Incisions and vital signs are stable for discharge home today in satisfactory condition.       Vital Signs  Temp:  [97 °F (36.1 °C)-98.5 °F (36.9 °C)] 98.5 °F (36.9 °C)  Heart Rate:  [56-80] 56  Resp:  [14-16] 16  BP: ()/(42-77) 178/77  Arterial Line BP: (122-169)/(33-50) 169/42      10/13/21  0632 10/14/21  0500   Weight: 49.3 kg (108 lb 11 oz) 52 kg (114 lb 11.2 oz)       Pertinent Test Results:  Lab Results   Component Value Date    WBC 8.42 10/06/2021    HGB 13.8 10/06/2021    HCT 39.6 10/06/2021    MCV 90.2 10/06/2021     10/06/2021     Lab Results   Component Value Date    GLUCOSE 94 10/06/2021    BUN 10 10/06/2021    CREATININE 0.71 10/06/2021    EGFRIFNONA 78 10/06/2021    BCR 14.1 10/06/2021    K 4.2 10/06/2021    CO2 26.0 10/06/2021    CALCIUM 9.3 10/06/2021    ALBUMIN 4.30 06/26/2019    AST 11 06/26/2019    ALT 7 06/26/2019       Physical Exam:  Physical Exam   General Appearance:  Comfortable.    Vital signs: (most recent): Blood pressure 178/77, pulse 56, temperature 98.5 °F (36.9 °C), temperature source Temporal, resp. rate 16, height 160 cm (63\"), weight 52 kg (114 lb 11.2 oz), SpO2 100 %.  Vital signs are normal.    Output: Producing urine.    HEENT: Normal HEENT exam.    Lungs:  Normal effort and normal respiratory rate.  There are rhonchi.    Heart: Bradycardia.    Abdomen: Abdomen is soft.  Bowel sounds are normal.     Extremities: Decreased range of motion.  (Walker)  Neurological: Patient is alert and oriented to person, place and time.  Normal strength.    Skin:  Warm and dry.  ((L) clavicle: CDI, bruising  (R) groin: CDI, bruising)    Additional Instructions for the Follow-ups that You Need to Schedule     Discharge Follow-up with Specialty: " Vascular Surgery; 1 Week   As directed      Specialty: Vascular Surgery    Follow Up: 1 Week    Follow Up Details: Coco KHALIL as scheduled               Discharge Instructions: Discharge instructions include no heavy lifting anything greater than 10lbs for approximately 1 week.  No sex or driving for 1-2 weeks. Printed information given to the patient with advancement of activities weekly.  Risks and benefits of narcotic medications and weaning postoperatively have been discussed. Clean operative site with antibacterial soap/water, pat dry. Keep open to air unless draining, then may apply dry dressing.  No ointments or creams unless prescribed by provider. For signs and symptoms of infection including drainage from operative site, redness, swelling, with associated fever and/or chills notify [facility] immediately for wound check. If you should experience any neurological symptoms including but not limited to visual or speech disturbances confusion, seizures, or weakness of limbs of one side of your body notify [facility] immediately for evaluation or if after hours present to the nearest Emergency Department.  Patient verbalizes understanding of discharge instructions, all questions are answered, follow-up appointments have been made, the patient is discharged home in stable condition.       Follow-up Appointments  Future Appointments   Date Time Provider Department Center   10/22/2021  1:45 PM Coco Perla APRN [facility] None       Test Results Pending at Discharge        Time: Discharge 60 min          This document has been electronically signed by REMI Ortega on October 14, 2021 09:54 CDT

## 2021-10-14 NOTE — PROGRESS NOTES
"  Cardiothoracic - Vascular Surgery Daily Note        LOS: 1 day   Patient Care Team:  Ariana Adkins DO as PCP - General    POD#1 LEFT TCAR    Chief Complaint: Carotid Stenosis      Subjective     The following portions of the patient's history were reviewed and updated as appropriate: allergies, current medications, past family history, past medical history, past social history, past surgical history and problem list.     Subjective:  Symptoms:  Stable.    Diet:  Adequate intake.    Activity level: Impaired due to weakness.    Pain:  She reports no pain.          Objective     Vital Signs  Temp:  [97 °F (36.1 °C)-98.5 °F (36.9 °C)] 98.5 °F (36.9 °C)  Heart Rate:  [56-80] 56  Resp:  [14-16] 16  BP: ()/(42-77) 178/77  Arterial Line BP: (122-169)/(33-51) 169/42  Body mass index is 20.32 kg/m².    Intake/Output Summary (Last 24 hours) at 10/14/2021 0947  Last data filed at 10/14/2021 0800  Gross per 24 hour   Intake 1403 ml   Output 1175 ml   Net 228 ml     I/O this shift:  In: 120 [P.O.:120]  Out: -     Wt Readings from Last 3 Encounters:   10/14/21 52 kg (114 lb 11.2 oz)   10/06/21 48.5 kg (107 lb)   10/21/19 51.7 kg (114 lb)         Physical Exam   Objective:  General Appearance:  Comfortable.    Vital signs: (most recent): Blood pressure 178/77, pulse 56, temperature 98.5 °F (36.9 °C), temperature source Temporal, resp. rate 16, height 160 cm (63\"), weight 52 kg (114 lb 11.2 oz), SpO2 100 %.  Vital signs are normal.    Output: Producing urine.    HEENT: Normal HEENT exam.    Lungs:  Normal effort and normal respiratory rate.  There are rhonchi.    Heart: Bradycardia.    Abdomen: Abdomen is soft.  Bowel sounds are normal.     Extremities: Decreased range of motion.  (Walker)  Neurological: Patient is alert and oriented to person, place and time.  Normal strength.    Skin:  Warm and dry.  ((L) clavicle: CDI, bruising  (R) groin: CDI, bruising)              Results Review:    Lab Results   Component Value Date "    WBC 8.42 10/06/2021    HGB 13.8 10/06/2021    HCT 39.6 10/06/2021    MCV 90.2 10/06/2021     10/06/2021     Lab Results   Component Value Date    GLUCOSE 94 10/06/2021    BUN 10 10/06/2021    CREATININE 0.71 10/06/2021    EGFRIFNONA 78 10/06/2021    BCR 14.1 10/06/2021    K 4.2 10/06/2021    CO2 26.0 10/06/2021    CALCIUM 9.3 10/06/2021    ALBUMIN 4.30 06/26/2019    AST 11 06/26/2019    ALT 7 06/26/2019       Calcium No results found for: CALCIUM   Magnesium No results found for: MG     Imaging Results (Last 24 Hours)     Procedure Component Value Units Date/Time    FL C Arm During Surgery [[id number]] Resulted: 10/13/21 1608     Updated: 10/13/21 1608                              albuterol, 2.5 mg, Nebulization, Q8H - RT  aspirin, 81 mg, Oral, Daily  cetirizine, 10 mg, Oral, Daily  clopidogrel, 75 mg, Oral, Daily  hydrALAZINE, 50 mg, Oral, TID  isosorbide mononitrate, 30 mg, Oral, Daily  metoprolol tartrate, 25 mg, Oral, Q12H  sodium bicarbonate, 325 mg, Oral, Once per day on Sun Tue Thu Sat  valsartan, 160 mg, Oral, BID      nitroglycerin, 5-200 mcg/min, Last Rate: Stopped (10/13/21 1230)  sodium chloride, 75 mL/hr, Last Rate: 75 mL/hr (10/14/21 0409)              ASSESSMENT/PLAN     Active Hospital Problems    Diagnosis  POA   • **Bilateral carotid artery stenosis [I65.23]  Yes     Added automatically from request for surgery [id number]         Stable Post Op LEFT TCAR: Progressing well    Carotid Stenosis: Medical Management: Aspirin, Plavix, Repatha    Respiratory: RA. Incentive spirometer.     Pain: Scheduled Tylenol.     Rehab: OOB to chair. Ambulate-baseling    Disposition: Stable DC home            This document has been electronically signed by REMI Ortega on October 14, 2021 09:48 CDT

## 2021-10-15 ENCOUNTER — READMISSION MANAGEMENT (OUTPATIENT)
Dept: CALL CENTER | Facility: HOSPITAL | Age: 86
End: 2021-10-15

## 2021-10-15 NOTE — PAYOR COMM NOTE
"Brie Watson  Case Management Extender  [phone] phone  [phone] fax    Auth# [id number]    Minerva Madrid (86 y.o. Female)             Date of Birth Social Security Number Address Home Phone MRN    1935  [facility] [address] [place] [address] [phone] [id number]    Christianity Marital Status             Taoist        Admission Date Admission Type Admitting Provider Attending Provider Department, Room/Bed    10/13/21 Elective Mark Kong MD  [facility] CRITICAL CARE STEPDOWN, 14/A    Discharge Date Discharge Disposition Discharge Destination          10/14/2021 Home or Self Care              Attending Provider: (none)   Allergies: Amoxicillin, Erythromycin Base, Iodine, Shellfish-derived Products, Simvastatin    Isolation: None   Infection: None   Code Status: Prior   Advance Care Planning Activity    Ht: 160 cm (63\")   Wt: 52 kg (114 lb 11.2 oz)    Admission Cmt: None   Principal Problem: Bilateral carotid artery stenosis [I65.23] More...                 Active Insurance as of 10/13/2021     Primary Coverage     Payor Plan Insurance Group Employer/Plan Group    ANTHEM MEDICARE REPLACEMENT ANTHEM MEDICARE ADVANTAGE [id number]     Payor Plan Address Payor Plan Phone Number Payor Plan Fax Number Effective Dates    [address] [phone]  10/1/2021 - None Entered    [facility] [id number]       Subscriber Name Subscriber Birth Date Member ID       MINERVA MADRID 1935 [id number]                 Emergency Contacts      (Rel.) Home Phone Work Phone Mobile Phone    Volodymyr Huang (Son) [phone]    ApoorvaRenee (Sister) [phone] -- [phone]               Discharge Summary      Coco Perla APRN at 10/14/21 0954     Attestation signed by Mark Kong MD at 10/14/21 1332    I have reviewed this documentation and agree.                    CVTS DISCHARGE SUMMARY  Date of " Admission: 10/13/2021  6:13 AM  Date of Discharge:  10/14/2021    Admission Diagnosis:   Bilateral carotid artery stenosis [I65.23]    Discharge Diagnosis:   Post-Op Diagnosis Codes:     * Bilateral carotid artery stenosis [I65.23]  Active Hospital Problems    Diagnosis  POA   • **Bilateral carotid artery stenosis [I65.23]  Yes   • Mixed hyperlipidemia [E78.2]  Yes   • PVD (peripheral vascular disease) ([facility]) [I73.9]  Yes   • Chronic low back pain [M54.50, G89.29]  Yes   • Essential hypertension [I10]  Yes      Resolved Hospital Problems   No resolved problems to display.       Consults:   Consults     No orders found for last 30 day(s).          Procedures Performed  Procedure(s): LEFT  TRANSCAROTID ARTERY REVASCULARIZATION, CAROTID  CEREBRAL ARTERIOGRAM , CAROTID STENT           (C-ARM#2 AND C-ARM TABLE)       Discharge Medications     Discharge Medications      Continue These Medications      Instructions Start Date   aspirin 81 MG tablet   81 mg, Oral, Daily      clopidogrel 75 MG tablet  Commonly known as: PLAVIX   75 mg, Oral, Daily      hydrALAZINE 25 MG tablet  Commonly known as: APRESOLINE   50 mg, Oral, 3 Times Daily      isosorbide mononitrate 30 MG 24 hr tablet  Commonly known as: IMDUR   30 mg, Oral, Daily      Loratadine 10 MG capsule   10 mg, Oral, Daily PRN, Allergy relief       METOPROLOL TARTRATE PO   25 mg, Oral, 2 Times Daily      Omega-3 Fish Oil 1000 MG capsule   1,000 mg, Oral, Daily      Repatha solution prefilled syringe injection  Generic drug: Evolocumab   140 mg, Subcutaneous, Every 14 Days      sodium bicarbonate 650 MG tablet   325 mg, Oral, 4 Times Weekly, [id number]       valsartan 160 MG tablet  Commonly known as: DIOVAN   160 mg, Oral, 2 Times Daily      vitamin B-12 1000 MCG tablet  Commonly known as: CYANOCOBALAMIN   1,000 mcg, Sublingual, Daily           Medical Management: Aspirin, Plavix, Repatha     Discharge Diet:   Diet Instructions     Diet: Cardiac, Consistent  Carbohydrate      Discharge Diet:  Cardiac  Consistent Carbohydrate             Discharge Disposition: Home in stable condition with follow up appointments with CVTS Nurse Practitioner 1 week, Dr. Kong 4 weeks, Cardiology/PCP as scheduled.     History of Present Illness/Hospital Course:    86y.o. female with HTN(stable, increased risk stroke, rupture), Hyperlipidemia(stable, increased risk cardiovascular events), Diabetes Mellitus(stable, increased risk cardiovascular events), Smoker(uncontrolled, increased risk cardiovascular events) and COPD(stable, increased risk pulmonary complications) , PVD(stable, increase risk cardiovascular events), Carotid Stenosis(chronic severe progression, increase risk stroke)  smokes 1/2 PPD.  Moderate leg weakness, balance problems, leg cramps at night.  Limited claudication symptoms.  Uses walker, limited ambulation.  Prior stroke, hip replacement.  No other associated signs, symptoms or modifying factors.  2009 LEFT CEA  2016 Stroke  2/2017 Carotid Duplex:  DENVER 0-49% (120/18cm/s, ratio 0.9), LICA >70% (230/36cm/s, ratio 1.8) antegrade verts.  3/2018 Carotid Duplex:  DENVER 0-49% (73/19cm/s, ratio 0.7), LICA 50-69% (186/39cm/s, ratio 1.8) antegrade verts.  3/2019 Carotid Duplex:  DENVER 0-49% (111/22cm/s, ratio 0.8), LICA 50-69% (183/41cm/s, ratio 1.9) antegrade verts.  8/2021 Carotid Duplex:  DENVER 0-49% (123/14cm/s, ratio 1.0), LICA 50-69% (293/63cm/s, ratio 3.4) antegrade verts.  8/2021 CTA Carotids:  DENVER no significant obstruction.  LICA 90%   Adequate pre-op studies were completed and the patient was scheduled electively. Operative day Minerva Madrid admitted through [facility], taken to operating suite and placed under general anesthesia.  Underwent said procedure without complications or difficulty. The patient was weaned easily from mechanical ventilation and extubated in the recovery room placed on oxygen per nasal cannula and further transferred to CCU for further care and  "monitoring. Minerva Madrid remained hemodynamically and neurovascularly stable immediately postop.  POD1 the patient was out of the bed to the chair tolerating breakfast without any difficulty swallowing.  Incisions and vital signs are stable for discharge home today in satisfactory condition.       Vital Signs  Temp:  [97 °F (36.1 °C)-98.5 °F (36.9 °C)] 98.5 °F (36.9 °C)  Heart Rate:  [56-80] 56  Resp:  [14-16] 16  BP: ()/(42-77) 178/77  Arterial Line BP: (122-169)/(33-50) 169/42      10/13/21  0632 10/14/21  0500   Weight: 49.3 kg (108 lb 11 oz) 52 kg (114 lb 11.2 oz)       Pertinent Test Results:  Lab Results   Component Value Date    WBC 8.42 10/06/2021    HGB 13.8 10/06/2021    HCT 39.6 10/06/2021    MCV 90.2 10/06/2021     10/06/2021     Lab Results   Component Value Date    GLUCOSE 94 10/06/2021    BUN 10 10/06/2021    CREATININE 0.71 10/06/2021    EGFRIFNONA 78 10/06/2021    BCR 14.1 10/06/2021    K 4.2 10/06/2021    CO2 26.0 10/06/2021    CALCIUM 9.3 10/06/2021    ALBUMIN 4.30 06/26/2019    AST 11 06/26/2019    ALT 7 06/26/2019       Physical Exam:  Physical Exam   General Appearance:  Comfortable.    Vital signs: (most recent): Blood pressure 178/77, pulse 56, temperature 98.5 °F (36.9 °C), temperature source Temporal, resp. rate 16, height 160 cm (63\"), weight 52 kg (114 lb 11.2 oz), SpO2 100 %.  Vital signs are normal.    Output: Producing urine.    HEENT: Normal HEENT exam.    Lungs:  Normal effort and normal respiratory rate.  There are rhonchi.    Heart: Bradycardia.    Abdomen: Abdomen is soft.  Bowel sounds are normal.     Extremities: Decreased range of motion.  (Walker)  Neurological: Patient is alert and oriented to person, place and time.  Normal strength.    Skin:  Warm and dry.  ((L) clavicle: CDI, bruising  (R) groin: CDI, bruising)    Additional Instructions for the Follow-ups that You Need to Schedule     Discharge Follow-up with Specialty: Vascular Surgery; 1 Week   As " directed      Specialty: Vascular Surgery    Follow Up: 1 Week    Follow Up Details: Coco KHALIL as scheduled               Discharge Instructions: Discharge instructions include no heavy lifting anything greater than 10lbs for approximately 1 week.  No sex or driving for 1-2 weeks. Printed information given to the patient with advancement of activities weekly.  Risks and benefits of narcotic medications and weaning postoperatively have been discussed. Clean operative site with antibacterial soap/water, pat dry. Keep open to air unless draining, then may apply dry dressing.  No ointments or creams unless prescribed by provider. For signs and symptoms of infection including drainage from operative site, redness, swelling, with associated fever and/or chills notify [facility] immediately for wound check. If you should experience any neurological symptoms including but not limited to visual or speech disturbances confusion, seizures, or weakness of limbs of one side of your body notify [facility] immediately for evaluation or if after hours present to the nearest Emergency Department.  Patient verbalizes understanding of discharge instructions, all questions are answered, follow-up appointments have been made, the patient is discharged home in stable condition.       Follow-up Appointments  Future Appointments   Date Time Provider Department Center   10/22/2021  1:45 PM Coco Perla APRN [facility] None       Test Results Pending at Discharge        Time: Discharge 60 min          This document has been electronically signed by REMI Ortega on October 14, 2021 09:54 CDT        Electronically signed by Mark Kong MD at 10/14/21 1513

## 2021-10-15 NOTE — OUTREACH NOTE
Prep Survey      Responses   [facility] facility patient discharged from? [place]   Is LACE score < 7 ? No   Emergency Room discharge w/ pulse ox? No   Eligibility Readm Mgmt   Discharge diagnosis  Bilateral carotid artery stenosis    Does the patient have one of the following disease processes/diagnoses(primary or secondary)? Other   Does the patient have Home health ordered? No   Is there a DME ordered? No   Prep survey completed? Yes          Roseline Johansen RN

## 2021-10-19 ENCOUNTER — READMISSION MANAGEMENT (OUTPATIENT)
Dept: CALL CENTER | Facility: HOSPITAL | Age: 86
End: 2021-10-19

## 2021-10-19 NOTE — OUTREACH NOTE
Medical Week 1 Survey      Responses   [facility] patient discharged from? [place]   Does the patient have one of the following disease processes/diagnoses(primary or secondary)? Other   Week 1 attempt successful? Yes   Call start time 1221   Call end time 1226   Discharge diagnosis  Bilateral carotid artery stenosis    Meds reviewed with patient/caregiver? Yes   Is the patient having any side effects they believe may be caused by any medication additions or changes? No   Does the patient have all medications ordered at discharge? N/A   Is the patient taking all medications as directed (includes completed medication regime)? Yes   Does the patient have a primary care provider?  Yes   Does the patient have an appointment with their PCP within 7 days of discharge? N/A   Has the patient kept scheduled appointments due by today? N/A   Comments Surgeon appt this Friday.   Has home health visited the patient within 72 hours of discharge? N/A   Has all DME been delivered? No   Psychosocial issues? No   Did the patient receive a copy of their discharge instructions? Yes   Nursing interventions Reviewed instructions with patient   What is the patient's perception of their health status since discharge? Improving   Is the patient/caregiver able to teach back signs and symptoms related to disease process for when to call PCP? Yes   Is the patient/caregiver able to teach back signs and symptoms related to disease process for when to call 911? Yes   Is the patient/caregiver able to teach back the hierarchy of who to call/visit for symptoms/problems? PCP, Specialist, Home health nurse, Urgent Care, ED, 911 Yes   If the patient is a current smoker, are they able to teach back resources for cessation? Not a smoker   Additional teach back comments Says her wound is healing and without signs symptoms of infection or issue.   Week 1 call completed? Yes   Wrap up additional comments She is improving and her wound is healing.           Jonelle Vides, RN

## 2021-10-22 ENCOUNTER — OFFICE VISIT (OUTPATIENT)
Dept: CARDIAC SURGERY | Facility: CLINIC | Age: 86
End: 2021-10-22

## 2021-10-22 VITALS
TEMPERATURE: 98 F | SYSTOLIC BLOOD PRESSURE: 114 MMHG | BODY MASS INDEX: 20.2 KG/M2 | OXYGEN SATURATION: 98 % | DIASTOLIC BLOOD PRESSURE: 81 MMHG | HEART RATE: 60 BPM | WEIGHT: 114 LBS | HEIGHT: 63 IN

## 2021-10-22 DIAGNOSIS — I65.23 BILATERAL CAROTID ARTERY STENOSIS: ICD-10-CM

## 2021-10-22 DIAGNOSIS — F17.218 NICOTINE DEPENDENCE, CIGARETTES, WITH OTHER NICOTINE-INDUCED DISORDERS: ICD-10-CM

## 2021-10-22 DIAGNOSIS — I10 ESSENTIAL HYPERTENSION: Chronic | ICD-10-CM

## 2021-10-22 DIAGNOSIS — Z09 FOLLOW-UP SURGERY CARE: Primary | ICD-10-CM

## 2021-10-22 DIAGNOSIS — E78.2 MIXED HYPERLIPIDEMIA: ICD-10-CM

## 2021-10-22 PROCEDURE — 99024 POSTOP FOLLOW-UP VISIT: CPT | Performed by: NURSE PRACTITIONER

## 2021-10-22 RX ORDER — EVOLOCUMAB 140 MG/ML
140 INJECTION, SOLUTION SUBCUTANEOUS
Qty: 1 ML | Refills: 5 | Status: SHIPPED | OUTPATIENT
Start: 2021-10-22 | End: 2022-02-15

## 2021-10-22 NOTE — PROGRESS NOTES
Minerva Miles Madrid  1935    Chief Complaint   Patient presents with   • Carotid Artery Disease     HPI:      PCP:  Ariana Adkins,      86y.o. female with HTN(stable, increased risk stroke, rupture), Hyperlipidemia(stable, increased risk cardiovascular events), Diabetes Mellitus(stable, increased risk cardiovascular events), Smoker(uncontrolled, increased risk cardiovascular events) and COPD(stable, increased risk pulmonary complications) , PVD(stable, increase risk cardiovascular events), Carotid Stenosis(chronic severe progression, increase risk stroke)  smokes 1/2 PPD.  Moderate leg weakness, balance problems, leg cramps at night.  Limited claudication symptoms.  Uses walker, limited ambulation.  Prior stroke, hip replacement.  Progressed well post TCAR. Returns for follow up. No other associated signs, symptoms or modifying factors.     2/2017 IDALIA:  RIGHT .79 triphasic.  LEFT .76 triphasic.  3/2018 IDALIA:  RIGHT .88 triphasic.  LEFT .85 triphasic.  3/2019 IDALIA:  RIGHT .55 biphasic.  LEFT .62 biphasic.  6/2019 IDALIA:  RIGHT .49 tri/biphasic.  LEFT .75 tri/biphasic.  7/2019 CTA runoff:  Focal dissection distal thoracic aorta, distal aorta 10mm, circumferential calcification.  RIGHT SFA 80%, pop/tibial occlusion.  LEFT EIA 60% bifucation, 80% mid.  SFA moderate diffuse disease, 2v runoff.  10/2019 IDALIA:  RIGHT .43 tri/biphasic.  LEFT .51 biphasic.  6/2020 IDALIA:  RIGHT .66 tri/biphasic.  LEFT .63 tri/biphasic.  8/2021 IDALIA:  RIGHT .63 biphasic.  LEFT .62 biphasic.     2009 LEFT CEA  2016 Stroke  2/2017 Carotid Duplex:  DENVER 0-49% (120/18cm/s, ratio 0.9), LICA >70% (230/36cm/s, ratio 1.8) antegrade verts.  3/2018 Carotid Duplex:  DENVER 0-49% (73/19cm/s, ratio 0.7), LICA 50-69% (186/39cm/s, ratio 1.8) antegrade verts.  3/2019 Carotid Duplex:  DENVER 0-49% (111/22cm/s, ratio 0.8), LICA 50-69% (183/41cm/s, ratio 1.9) antegrade verts.  8/2021 Carotid Duplex:  DENVER 0-49% (123/14cm/s, ratio 1.0), LICA 50-69% (293/63cm/s,  ratio 3.4) antegrade verts.  8/2021 CTA Carotids:  DENVER no significant obstruction.  LICA 90%  10/13/21: LEFT TCAR     10/2018 ECG:  , QTc 440  10/2018 Echocardiogram:  EF 55%, LA 34mm, LV 33mm, RVSP 33mmHg.   Mild MR TR.    The following portions of the patient's history were reviewed and updated as appropriate: allergies, current medications, past family history, past medical history, past social history, past surgical history and problem list.  Recent images independently reviewed.  Available laboratory values reviewed.    PMH:  Past Medical History:   Diagnosis Date   • Appetite loss    • Arthritis    • Coronary artery disease    • Coughing    • Essential hypertension    • Hyperlipidemia    • Leg pain    • Muscle weakness    • Postmenopausal bleeding     With thickened endometrial stripe    • Stroke (HCC) 02/2016     Past Surgical History:   Procedure Laterality Date   • CAROTID ENDARTERECTOMY Right    • CORONARY ARTERY BYPASS GRAFT     • JOINT REPLACEMENT Left 01/2017    partial hip replacement   • MASTOID SURGERY       Family History   Problem Relation Age of Onset   • No Known Problems Mother    • No Known Problems Father      Social History     Tobacco Use   • Smoking status: Current Every Day Smoker     Packs/day: 1.00     Years: 56.00     Pack years: 56.00     Types: Cigarettes   • Smokeless tobacco: Never Used   Vaping Use   • Vaping Use: Never used   Substance Use Topics   • Alcohol use: No   • Drug use: No       ALLERGIES:  Allergies   Allergen Reactions   • Amoxicillin Other (See Comments)     Pt unsure   • Erythromycin Base Other (See Comments)     Pt unsure   • Iodine Other (See Comments)     Pt unsure   • Shellfish-Derived Products Other (See Comments)     Pt unsure   • Simvastatin Myalgia         MEDICATIONS:    Current Outpatient Medications:   •  aspirin 81 MG tablet, Take 81 mg by mouth Daily., Disp: , Rfl:   •  clopidogrel (PLAVIX) 75 MG tablet, Take 75 mg by mouth Daily., Disp: , Rfl:    •  Evolocumab (Repatha) solution prefilled syringe injection, Inject 1 mL under the skin into the appropriate area as directed Every 14 (Fourteen) Days., Disp: 1 mL, Rfl: 5  •  hydrALAZINE (APRESOLINE) 25 MG tablet, Take 50 mg by mouth 3 (Three) Times a Day., Disp: , Rfl:   •  isosorbide mononitrate (IMDUR) 30 MG 24 hr tablet, Take 30 mg by mouth Daily., Disp: , Rfl:   •  Loratadine 10 MG capsule, Take 10 mg by mouth Daily As Needed. Allergy relief, Disp: , Rfl:   •  METOPROLOL TARTRATE PO, Take 25 mg by mouth 2 (Two) Times a Day., Disp: , Rfl:   •  Omega-3 Fatty Acids (OMEGA-3 FISH OIL) 1000 MG capsule, Take 1,000 mg by mouth Daily., Disp: , Rfl:   •  sodium bicarbonate 650 MG tablet, Take 325 mg by mouth 4 (Four) Times a Week. [id number], Disp: , Rfl:   •  valsartan (DIOVAN) 160 MG tablet, Take 160 mg by mouth 2 (Two) Times a Day., Disp: , Rfl:   •  vitamin B-12 (CYANOCOBALAMIN) 1000 MCG tablet, Place 1,000 mcg under the tongue Daily., Disp: , Rfl:     Current Facility-Administered Medications:   •  Evolocumab (REPATHA) SureClick injection 140 mg, 140 mg, Subcutaneous, Once, Coco Perla APRN    Review of Systems   Review of Systems   Constitutional: Positive for malaise/fatigue. Negative for weight loss.   HENT: Positive for hearing loss.    Cardiovascular: Positive for dyspnea on exertion. Negative for chest pain and claudication.   Respiratory: Negative for cough and shortness of breath.    Hematologic/Lymphatic: Bruises/bleeds easily.   Skin: Negative for color change and poor wound healing.   Musculoskeletal: Positive for back pain, falls, muscle cramps and stiffness.   Neurological: Positive for dizziness, loss of balance, numbness and paresthesias. Negative for weakness.       Physical Exam   Vitals:    10/22/21 1410   BP: 114/81   BP Location: Right arm   Patient Position: Sitting   Cuff Size: Adult   Pulse: 60   Temp: 98 °F (36.7 °C)   TempSrc: Temporal   SpO2: 98%   Weight: 51.7 kg (114 lb)  "  Height: 160 cm (63\")     Body surface area is 1.52 meters squared.  Body mass index is 20.19 kg/m².  Physical Exam  Constitutional:       General: She is not in acute distress.     Appearance: She is not ill-appearing.   HENT:      Right Ear: Hearing normal.      Left Ear: Hearing normal.      Nose: No nasal deformity.      Mouth/Throat:      Dentition: Normal dentition. Does not have dentures.   Neck:      Comments: (L) clavicle Inc: CDI  Cardiovascular:      Rate and Rhythm: Normal rate and regular rhythm.      Pulses:           Carotid pulses are 2+ on the right side and 2+ on the left side.       Radial pulses are 2+ on the right side and 2+ on the left side.        Posterior tibial pulses are 1+ on the right side and 1+ on the left side.      Heart sounds: No murmur heard.      Pulmonary:      Effort: Pulmonary effort is normal.      Breath sounds: Normal breath sounds.   Abdominal:      General: There is no distension.      Palpations: Abdomen is soft. There is no mass.      Tenderness: There is no abdominal tenderness.   Musculoskeletal:         General: No deformity.      Comments: Gait normal.    Skin:     General: Skin is warm and dry.      Coloration: Skin is not pale.      Findings: No erythema.      Comments: No venous staining   Neurological:      General: No focal deficit present.      Mental Status: She is alert and oriented to person, place, and time.   Psychiatric:         Speech: Speech normal.         Behavior: Behavior is cooperative.         Thought Content: Thought content normal.         Judgment: Judgment normal.         BUN   Date Value Ref Range Status   10/06/2021 10 8 - 23 mg/dL Final     Creatinine   Date Value Ref Range Status   10/06/2021 0.71 0.57 - 1.00 mg/dL Final     eGFR Non  Amer   Date Value Ref Range Status   10/06/2021 78 >60 mL/min/1.73 Final       ASSESSMENT:PLAN  Diagnoses and all orders for this visit:    1. Follow-up surgery care (Primary)  Stable Post Op LEFT " TCAR: Progressing well  Signs and symptoms of infection including drainage from operative site, redness, swelling, with associated fever and/or chills notify [facility] V[facility] center immediately for wound check.      2. Bilateral carotid artery stenosis  Medical Management: Aspirin, Plavix, Repatha  Repatha injection given today.   If you should experience any neurological symptoms including but not limited to visual or speech disturbances confusion, seizures, or weakness of limbs of one side of your body notify [facility] and Vascular center immediately for evaluation or if after hours present to the nearest Emergency Department.    -     Duplex Carotid - Left Ultrasound CAR; Future    3. Mixed hyperlipidemia  Repatha.     4. Essential hypertension  Controlled.     5. Nicotine dependence, cigarettes, with other nicotine-induced disorders  Smoking cessation assistance options offered including behavioral counseling (Smoking Cessation Classes), Nicotine replacement therapy (patches or gum), pharmacologic therapy (Chantix, Wellbutrin). Understands tobacco increases risk of expanding AAA, MI, CVA, PAD, carcinoma. Discussion and question answer period 5-7 minutes. Minerva Madrid  reports that she has been smoking cigarettes. She has a 56.00 pack-year smoking history. She has never used smokeless tobacco.. I have educated her on the risk of diseases from using tobacco products such as cancer, COPD and heart disease.     I advised her to quit and she is not willing to quit.    I spent 5 minutes counseling the patient.            Advance Care Planning discussed and  Informational packet given to patient. Advance Care Plan on file: No    Patient's Body mass index is 20.19 kg/m². indicating that she is within normal range (BMI 18.5-24.9). No BMI management plan needed..           This document has been electronically signed by REMI Ortega on October 25, 2021 15:11 CDT

## 2021-10-22 NOTE — PATIENT INSTRUCTIONS
Stable Post Op LEFT TCAR: Progressing well  Medical Management: Aspirin, Plavix, Repatha    Signs and symptoms of infection including drainage from operative site, redness, swelling, with associated fever and/or chills notify [facility] and Vascular center immediately for wound check.    Return 3 weeks

## 2021-10-27 ENCOUNTER — READMISSION MANAGEMENT (OUTPATIENT)
Dept: CALL CENTER | Facility: HOSPITAL | Age: 86
End: 2021-10-27

## 2021-10-27 NOTE — OUTREACH NOTE
Medical Week 2 Survey      Responses   [facility] patient discharged from? [place]   Does the patient have one of the following disease processes/diagnoses(primary or secondary)? Other   Week 2 attempt successful? Yes   Call start time 1449   Discharge diagnosis  Bilateral carotid artery stenosis    Call end time 1451   Meds reviewed with patient/caregiver? Yes   Is the patient having any side effects they believe may be caused by any medication additions or changes? No   Does the patient have all medications ordered at discharge? N/A   Is the patient taking all medications as directed (includes completed medication regime)? Yes   Does the patient have a primary care provider?  Yes   Does the patient have an appointment with their PCP within 7 days of discharge? N/A   Has the patient kept scheduled appointments due by today? N/A   Comments Saw Surgeon this past Friday   Has home health visited the patient within 72 hours of discharge? N/A   Has all DME been delivered? No   Psychosocial issues? No   Did the patient receive a copy of their discharge instructions? Yes   What is the patient's perception of their health status since discharge? Improving   Is the patient/caregiver able to teach back signs and symptoms related to disease process for when to call PCP? Yes   Is the patient/caregiver able to teach back signs and symptoms related to disease process for when to call 911? Yes   Is the patient/caregiver able to teach back the hierarchy of who to call/visit for symptoms/problems? PCP, Specialist, Home health nurse, Urgent Care, ED, 911 Yes   If the patient is a current smoker, are they able to teach back resources for cessation? Not a smoker   Additional teach back comments Incision are healing well   Week 2 Call Completed? Yes   Wrap up additional comments States she is improving and incision is healing well.           Cinthia Briggs RN

## 2021-11-02 ENCOUNTER — READMISSION MANAGEMENT (OUTPATIENT)
Dept: CALL CENTER | Facility: HOSPITAL | Age: 86
End: 2021-11-02

## 2021-11-02 NOTE — OUTREACH NOTE
Medical Week 3 Survey      Responses   [facility] patient discharged from? [place]   Does the patient have one of the following disease processes/diagnoses(primary or secondary)? Other   Week 3 attempt successful? Yes   Call start time 1648   Call end time 1650   Discharge diagnosis  Bilateral carotid artery stenosis    Meds reviewed with patient/caregiver? Yes   Is the patient taking all medications as directed (includes completed medication regime)? Yes   Has the patient kept scheduled appointments due by today? Yes   Comments Saw Surgeon this past Friday   What is the patient's perception of their health status since discharge? Improving   Week 3 Call Completed? Yes          Leni Fields RN

## 2021-11-10 ENCOUNTER — READMISSION MANAGEMENT (OUTPATIENT)
Dept: CALL CENTER | Facility: HOSPITAL | Age: 86
End: 2021-11-10

## 2021-11-10 NOTE — OUTREACH NOTE
Medical Week 4 Survey      Responses   [facility] patient discharged from? [place]   Does the patient have one of the following disease processes/diagnoses(primary or secondary)? Other   Week 4 attempt successful? No          Susan Cash RN

## 2021-11-12 ENCOUNTER — OFFICE VISIT (OUTPATIENT)
Dept: CARDIAC SURGERY | Facility: CLINIC | Age: 86
End: 2021-11-12

## 2021-11-12 VITALS
TEMPERATURE: 97.3 F | HEART RATE: 67 BPM | DIASTOLIC BLOOD PRESSURE: 80 MMHG | SYSTOLIC BLOOD PRESSURE: 152 MMHG | WEIGHT: 114 LBS | HEIGHT: 63 IN | OXYGEN SATURATION: 98 % | BODY MASS INDEX: 20.2 KG/M2

## 2021-11-12 DIAGNOSIS — I65.23 BILATERAL CAROTID ARTERY STENOSIS: Primary | ICD-10-CM

## 2021-11-12 DIAGNOSIS — E78.2 MIXED HYPERLIPIDEMIA: ICD-10-CM

## 2021-11-12 PROCEDURE — 99024 POSTOP FOLLOW-UP VISIT: CPT | Performed by: NURSE PRACTITIONER

## 2021-11-12 NOTE — PROGRESS NOTES
Minerva [place]  1935    Chief Complaint   Patient presents with   • Carotid Artery Disease     HPI:      PCP:  Ariana Adkins,      86y.o. female with HTN(stable, increased risk stroke, rupture), Hyperlipidemia(stable, increased risk cardiovascular events), Diabetes Mellitus(stable, increased risk cardiovascular events), Smoker(uncontrolled, increased risk cardiovascular events) and COPD(stable, increased risk pulmonary complications) , PVD(stable, increase risk cardiovascular events), Carotid Stenosis(chronic severe progression, increase risk stroke)  smokes 1/2 PPD.  Moderate leg weakness, balance problems, leg cramps at night.  Limited claudication symptoms.  Uses walker, limited ambulation.  Prior stroke, hip replacement.  Progressed well post TCAR. Returns for follow up. No other associated signs, symptoms or modifying factors.     2/2017 IDALIA:  RIGHT .79 triphasic.  LEFT .76 triphasic.  3/2018 IDALIA:  RIGHT .88 triphasic.  LEFT .85 triphasic.  3/2019 IDALIA:  RIGHT .55 biphasic.  LEFT .62 biphasic.  6/2019 IDALIA:  RIGHT .49 tri/biphasic.  LEFT .75 tri/biphasic.  7/2019 CTA runoff:  Focal dissection distal thoracic aorta, distal aorta 10mm, circumferential calcification.  RIGHT SFA 80%, pop/tibial occlusion.  LEFT EIA 60% bifucation, 80% mid.  SFA moderate diffuse disease, 2v runoff.  10/2019 IDALIA:  RIGHT .43 tri/biphasic.  LEFT .51 biphasic.  6/2020 IDALIA:  RIGHT .66 tri/biphasic.  LEFT .63 tri/biphasic.  8/2021 IDALIA:  RIGHT .63 biphasic.  LEFT .62 biphasic.     2009 LEFT CEA  2016 Stroke  2/2017 Carotid Duplex:  DENVER 0-49% (120/18cm/s, ratio 0.9), LICA >70% (230/36cm/s, ratio 1.8) antegrade verts.  3/2018 Carotid Duplex:  DENVER 0-49% (73/19cm/s, ratio 0.7), LICA 50-69% (186/39cm/s, ratio 1.8) antegrade verts.  3/2019 Carotid Duplex:  DENVER 0-49% (111/22cm/s, ratio 0.8), LICA 50-69% (183/41cm/s, ratio 1.9) antegrade verts.  8/2021 Carotid Duplex:  DENVER 0-49% (123/14cm/s, ratio 1.0), LICA 50-69% (293/63cm/s,  ratio 3.4) antegrade verts.  8/2021 CTA Carotids:  DENVER no significant obstruction.  LICA 90%  10/13/21: LEFT TCAR   11/2021 Carotid Duplex:  LICA 0-49% mPSV 93c/s Ratio 1.3, Antegrade vertebrals    10/2018 ECG:  , QTc 440  10/2018 Echocardiogram:  EF 55%, LA 34mm, LV 33mm, RVSP 33mmHg.   Mild MR TR.    The following portions of the patient's history were reviewed and updated as appropriate: allergies, current medications, past family history, past medical history, past social history, past surgical history and problem list.  Recent images independently reviewed.  Available laboratory values reviewed.    PMH:  Past Medical History:   Diagnosis Date   • Appetite loss    • Arthritis    • Coronary artery disease    • Coughing    • Essential hypertension    • Hyperlipidemia    • Leg pain    • Muscle weakness    • Postmenopausal bleeding     With thickened endometrial stripe    • Stroke (HCC) 02/2016     Past Surgical History:   Procedure Laterality Date   • CAROTID ENDARTERECTOMY Right    • CORONARY ARTERY BYPASS GRAFT     • JOINT REPLACEMENT Left 01/2017    partial hip replacement   • MASTOID SURGERY       Family History   Problem Relation Age of Onset   • No Known Problems Mother    • No Known Problems Father      Social History     Tobacco Use   • Smoking status: Current Every Day Smoker     Packs/day: 1.00     Years: 56.00     Pack years: 56.00     Types: Cigarettes   • Smokeless tobacco: Never Used   Vaping Use   • Vaping Use: Never used   Substance Use Topics   • Alcohol use: No   • Drug use: No       ALLERGIES:  Allergies   Allergen Reactions   • Amoxicillin Other (See Comments)     Pt unsure   • Erythromycin Base Other (See Comments)     Pt unsure   • Iodine Other (See Comments)     Pt unsure   • Shellfish-Derived Products Other (See Comments)     Pt unsure   • Simvastatin Myalgia         MEDICATIONS:    Current Outpatient Medications:   •  aspirin 81 MG tablet, Take 81 mg by mouth Daily., Disp: , Rfl:   •   clopidogrel (PLAVIX) 75 MG tablet, Take 75 mg by mouth Daily., Disp: , Rfl:   •  Evolocumab (Repatha) solution prefilled syringe injection, Inject 1 mL under the skin into the appropriate area as directed Every 14 (Fourteen) Days., Disp: 1 mL, Rfl: 5  •  hydrALAZINE (APRESOLINE) 25 MG tablet, Take 50 mg by mouth 3 (Three) Times a Day., Disp: , Rfl:   •  isosorbide mononitrate (IMDUR) 30 MG 24 hr tablet, Take 30 mg by mouth Daily., Disp: , Rfl:   •  Loratadine 10 MG capsule, Take 10 mg by mouth Daily As Needed. Allergy relief, Disp: , Rfl:   •  METOPROLOL TARTRATE PO, Take 25 mg by mouth 2 (Two) Times a Day., Disp: , Rfl:   •  Omega-3 Fatty Acids (OMEGA-3 FISH OIL) 1000 MG capsule, Take 1,000 mg by mouth Daily., Disp: , Rfl:   •  sodium bicarbonate 650 MG tablet, Take 325 mg by mouth 4 (Four) Times a Week. Q-DWR-FBRU-SAT, Disp: , Rfl:   •  valsartan (DIOVAN) 160 MG tablet, Take 160 mg by mouth 2 (Two) Times a Day., Disp: , Rfl:   •  vitamin B-12 (CYANOCOBALAMIN) 1000 MCG tablet, Place 1,000 mcg under the tongue Daily., Disp: , Rfl:     Current Facility-Administered Medications:   •  Evolocumab (REPATHA) SureClick injection 140 mg, 140 mg, Subcutaneous, Once, Coco Perla, REMI    Review of Systems   Review of Systems   Constitutional: Positive for malaise/fatigue. Negative for weight loss.   HENT: Positive for hearing loss.    Cardiovascular: Positive for dyspnea on exertion. Negative for chest pain and claudication.   Respiratory: Negative for cough and shortness of breath.    Hematologic/Lymphatic: Bruises/bleeds easily.   Skin: Negative for color change and poor wound healing.   Musculoskeletal: Positive for back pain, falls, muscle cramps and stiffness.   Neurological: Positive for dizziness, loss of balance, numbness and paresthesias. Negative for weakness.       Physical Exam   Vitals:    11/12/21 1332   BP: 152/80   BP Location: Left arm   Patient Position: Sitting   Cuff Size: Adult   Pulse: 67  "  Temp: 97.3 °F (36.3 °C)   TempSrc: Temporal   SpO2: 98%   Weight: 51.7 kg (114 lb)   Height: 160 cm (63\")     Body surface area is 1.52 meters squared.  Body mass index is 20.19 kg/m².  Physical Exam  Constitutional:       General: She is not in acute distress.     Appearance: She is not ill-appearing.   HENT:      Right Ear: Hearing normal.      Left Ear: Hearing normal.      Nose: No nasal deformity.      Mouth/Throat:      Dentition: Normal dentition. Does not have dentures.   Neck:      Comments: (L) clavicle Inc: CDI  Cardiovascular:      Rate and Rhythm: Normal rate and regular rhythm.      Pulses:           Carotid pulses are 2+ on the right side and 2+ on the left side.       Radial pulses are 2+ on the right side and 2+ on the left side.        Posterior tibial pulses are 1+ on the right side and 1+ on the left side.      Heart sounds: No murmur heard.      Pulmonary:      Effort: Pulmonary effort is normal.      Breath sounds: Normal breath sounds.   Abdominal:      General: There is no distension.      Palpations: Abdomen is soft. There is no mass.      Tenderness: There is no abdominal tenderness.   Musculoskeletal:         General: No deformity.      Comments: Gait normal.    Skin:     General: Skin is warm and dry.      Coloration: Skin is not pale.      Findings: No erythema.      Comments: No venous staining   Neurological:      General: No focal deficit present.      Mental Status: She is alert and oriented to person, place, and time.   Psychiatric:         Speech: Speech normal.         Behavior: Behavior is cooperative.         Thought Content: Thought content normal.         Judgment: Judgment normal.         BUN   Date Value Ref Range Status   10/06/2021 10 8 - 23 mg/dL Final     Creatinine   Date Value Ref Range Status   10/06/2021 0.71 0.57 - 1.00 mg/dL Final     eGFR Non  Amer   Date Value Ref Range Status   10/06/2021 78 >60 mL/min/1.73 Final       ASSESSMENT:PLAN  11/2021 Carotid " Duplex:  LICA 0-49% mPSV 93c/s Ratio 1.3, Antegrade vertebrals    1. Bilateral carotid artery stenosis  L ICA stent patent, significant improvement in velocities post procedure.      Clean operative site with antibacterial soap/water, pat dry. Keep open to air unless draining, then may apply dry dressing.  No ointments or creams unless prescribed by provider.    No new deficits.     Return 3 months repeat bilateral duplex    ASA, plavix, Repatha    2. Mixed hyperlipidemia  Repatha if covered by insurance.     3. Smoker  Smoking cessation assistance options offered including behavioral counseling (Smoking Cessation Classes), Nicotine replacement therapy (patches or gum), pharmacologic therapy (Chantix, Wellbutrin). Understands tobacco increases risk of expanding AAA, MI, CVA, PAD, carcinoma. Discussion and question answer period 5-7 minutes.          This document has been electronically signed by REMI Alonzo on November 12, 2021 14:19 CST

## 2021-11-12 NOTE — PATIENT INSTRUCTIONS
Right carotid stent open and flowing well. No evidence of obstruction or blockage.  Return in 3 months repeat bilateral ultrasound.     Clean operative site with antibacterial soap/water, pat dry. Keep open to air unless draining, then may apply dry dressing.  No ointments or creams unless prescribed by provider.

## 2022-02-01 DIAGNOSIS — I65.23 BILATERAL CAROTID ARTERY STENOSIS: Primary | ICD-10-CM

## 2022-02-11 ENCOUNTER — HOSPITAL ENCOUNTER (EMERGENCY)
Facility: HOSPITAL | Age: 87
Discharge: HOME | End: 2022-02-12
Attending: STUDENT IN AN ORGANIZED HEALTH CARE EDUCATION/TRAINING PROGRAM | Admitting: STUDENT IN AN ORGANIZED HEALTH CARE EDUCATION/TRAINING PROGRAM

## 2022-02-11 ENCOUNTER — APPOINTMENT (OUTPATIENT)
Dept: GENERAL RADIOLOGY | Facility: HOSPITAL | Age: 87
End: 2022-02-11

## 2022-02-11 DIAGNOSIS — Z00.00 NORMAL PHYSICAL EXAM: Primary | ICD-10-CM

## 2022-02-11 LAB
ALBUMIN SERPL-MCNC: 4.6 G/DL (ref 3.5–5.2)
ALBUMIN/GLOB SERPL: 2.4 G/DL
ALP SERPL-CCNC: 66 U/L (ref 39–117)
ALT SERPL W P-5'-P-CCNC: 6 U/L (ref 1–33)
ANION GAP SERPL CALCULATED.3IONS-SCNC: 12 MMOL/L (ref 5–15)
AST SERPL-CCNC: 12 U/L (ref 1–32)
BASOPHILS # BLD AUTO: 0.06 10*3/MM3 (ref 0–0.2)
BASOPHILS NFR BLD AUTO: 0.4 % (ref 0–1.5)
BILIRUB SERPL-MCNC: 0.3 MG/DL (ref 0–1.2)
BUN SERPL-MCNC: 13 MG/DL (ref 8–23)
BUN/CREAT SERPL: 14.4 (ref 7–25)
CALCIUM SPEC-SCNC: 9.5 MG/DL (ref 8.6–10.5)
CHLORIDE SERPL-SCNC: 97 MMOL/L (ref 98–107)
CO2 SERPL-SCNC: 26 MMOL/L (ref 22–29)
CREAT SERPL-MCNC: 0.9 MG/DL (ref 0.57–1)
DEPRECATED RDW RBC AUTO: 39.4 FL (ref 37–54)
EOSINOPHIL # BLD AUTO: 0.16 10*3/MM3 (ref 0–0.4)
EOSINOPHIL NFR BLD AUTO: 1.2 % (ref 0.3–6.2)
ERYTHROCYTE [DISTWIDTH] IN BLOOD BY AUTOMATED COUNT: 12.2 % (ref 12.3–15.4)
GFR SERPL CREATININE-BSD FRML MDRD: 60 ML/MIN/1.73
GLOBULIN UR ELPH-MCNC: 1.9 GM/DL
GLUCOSE SERPL-MCNC: 198 MG/DL (ref 65–99)
HCT VFR BLD AUTO: 41.4 % (ref 34–46.6)
HGB BLD-MCNC: 14.7 G/DL (ref 12–15.9)
HOLD SPECIMEN: NORMAL
IMM GRANULOCYTES # BLD AUTO: 0.09 10*3/MM3 (ref 0–0.05)
IMM GRANULOCYTES NFR BLD AUTO: 0.7 % (ref 0–0.5)
LYMPHOCYTES # BLD AUTO: 1.33 10*3/MM3 (ref 0.7–3.1)
LYMPHOCYTES NFR BLD AUTO: 9.8 % (ref 19.6–45.3)
MAGNESIUM SERPL-MCNC: 2 MG/DL (ref 1.6–2.4)
MCH RBC QN AUTO: 31.7 PG (ref 26.6–33)
MCHC RBC AUTO-ENTMCNC: 35.5 G/DL (ref 31.5–35.7)
MCV RBC AUTO: 89.4 FL (ref 79–97)
MONOCYTES # BLD AUTO: 0.79 10*3/MM3 (ref 0.1–0.9)
MONOCYTES NFR BLD AUTO: 5.8 % (ref 5–12)
NEUTROPHILS NFR BLD AUTO: 11.19 10*3/MM3 (ref 1.7–7)
NEUTROPHILS NFR BLD AUTO: 82.1 % (ref 42.7–76)
NRBC BLD AUTO-RTO: 0 /100 WBC (ref 0–0.2)
NT-PROBNP SERPL-MCNC: 697.5 PG/ML (ref 0–1800)
PLATELET # BLD AUTO: 370 10*3/MM3 (ref 140–450)
PMV BLD AUTO: 9.6 FL (ref 6–12)
POTASSIUM SERPL-SCNC: 4 MMOL/L (ref 3.5–5.2)
PROT SERPL-MCNC: 6.5 G/DL (ref 6–8.5)
RBC # BLD AUTO: 4.63 10*6/MM3 (ref 3.77–5.28)
SODIUM SERPL-SCNC: 135 MMOL/L (ref 136–145)
TROPONIN T SERPL-MCNC: <0.01 NG/ML (ref 0–0.03)
WBC NRBC COR # BLD: 13.62 10*3/MM3 (ref 3.4–10.8)
WHOLE BLOOD HOLD SPECIMEN: NORMAL

## 2022-02-11 PROCEDURE — 99283 EMERGENCY DEPT VISIT LOW MDM: CPT

## 2022-02-11 PROCEDURE — 83880 ASSAY OF NATRIURETIC PEPTIDE: CPT | Performed by: STUDENT IN AN ORGANIZED HEALTH CARE EDUCATION/TRAINING PROGRAM

## 2022-02-11 PROCEDURE — 85025 COMPLETE CBC W/AUTO DIFF WBC: CPT | Performed by: STUDENT IN AN ORGANIZED HEALTH CARE EDUCATION/TRAINING PROGRAM

## 2022-02-11 PROCEDURE — 83735 ASSAY OF MAGNESIUM: CPT | Performed by: STUDENT IN AN ORGANIZED HEALTH CARE EDUCATION/TRAINING PROGRAM

## 2022-02-11 PROCEDURE — 84484 ASSAY OF TROPONIN QUANT: CPT | Performed by: STUDENT IN AN ORGANIZED HEALTH CARE EDUCATION/TRAINING PROGRAM

## 2022-02-11 PROCEDURE — 99284 EMERGENCY DEPT VISIT MOD MDM: CPT

## 2022-02-11 PROCEDURE — 80053 COMPREHEN METABOLIC PANEL: CPT | Performed by: STUDENT IN AN ORGANIZED HEALTH CARE EDUCATION/TRAINING PROGRAM

## 2022-02-11 PROCEDURE — 71045 X-RAY EXAM CHEST 1 VIEW: CPT

## 2022-02-11 RX ADMIN — SODIUM CHLORIDE, POTASSIUM CHLORIDE, SODIUM LACTATE AND CALCIUM CHLORIDE 1000 ML: 600; 310; 30; 20 INJECTION, SOLUTION INTRAVENOUS at 21:39

## 2022-02-12 VITALS
RESPIRATION RATE: 18 BRPM | HEART RATE: 83 BPM | WEIGHT: 107 LBS | SYSTOLIC BLOOD PRESSURE: 195 MMHG | OXYGEN SATURATION: 95 % | DIASTOLIC BLOOD PRESSURE: 84 MMHG | TEMPERATURE: 97.9 F | BODY MASS INDEX: 18.96 KG/M2 | HEIGHT: 63 IN

## 2022-02-12 LAB
BACTERIA UR QL AUTO: ABNORMAL /HPF
BILIRUB UR QL STRIP: NEGATIVE
CLARITY UR: CLEAR
COLOR UR: YELLOW
GLUCOSE UR STRIP-MCNC: NEGATIVE MG/DL
HGB UR QL STRIP.AUTO: NEGATIVE
HYALINE CASTS UR QL AUTO: ABNORMAL /LPF
KETONES UR QL STRIP: NEGATIVE
LEUKOCYTE ESTERASE UR QL STRIP.AUTO: NEGATIVE
NITRITE UR QL STRIP: NEGATIVE
PH UR STRIP.AUTO: 7 [PH] (ref 5–9)
PROT UR QL STRIP: ABNORMAL
RBC # UR STRIP: ABNORMAL /HPF
REF LAB TEST METHOD: ABNORMAL
SP GR UR STRIP: 1.01 (ref 1–1.03)
SQUAMOUS #/AREA URNS HPF: ABNORMAL /HPF
UROBILINOGEN UR QL STRIP: ABNORMAL
WBC # UR STRIP: ABNORMAL /HPF

## 2022-02-12 PROCEDURE — 81001 URINALYSIS AUTO W/SCOPE: CPT | Performed by: STUDENT IN AN ORGANIZED HEALTH CARE EDUCATION/TRAINING PROGRAM

## 2022-02-12 NOTE — ED NOTES
From home, pt's family reports AMS, EMS reports unresponsive upon entrance to home but now A&0x4, glucose 159 and unsuccessful IV attempt       Hien Qiu, RN  02/11/22 2000

## 2022-02-12 NOTE — ED PROVIDER NOTES
"Subjective   86-year-old female comes to the ER chief complaint of a period of generalized weakness earlier this evening.  Patient states that earlier tonight her head sac to her chest and she felt too weak to lift it up.  Family states that they could not get her to lift her head up.  Patient seemed to not be responding to their calls.  No seizure-like activity.  Patient reports that she was able to lift her head up \"again\".  Patient right now in the ER denies having any symptoms.  Family states that they think they might have \"panicked\" and called the ambulance out of concern.      History provided by:  Patient   used: No        Review of Systems   Constitutional: Negative for chills and fever.   HENT: Negative for congestion and rhinorrhea.    Respiratory: Negative for cough and shortness of breath.    Cardiovascular: Negative for chest pain and palpitations.   Gastrointestinal: Negative for abdominal pain and nausea.   Genitourinary: Negative for dysuria and flank pain.   Skin: Negative for color change and rash.   Neurological: Negative for dizziness, weakness, light-headedness, numbness and headaches.   Psychiatric/Behavioral: Negative for agitation. The patient is not nervous/anxious.        Past Medical History:   Diagnosis Date   • Appetite loss    • Arthritis    • Coronary artery disease    • Coughing    • Essential hypertension    • Hyperlipidemia    • Leg pain    • Muscle weakness    • Postmenopausal bleeding     With thickened endometrial stripe    • Stroke (HCC) 02/2016       Allergies   Allergen Reactions   • Amoxicillin Other (See Comments)     Pt unsure   • Erythromycin Base Other (See Comments)     Pt unsure   • Iodine Other (See Comments)     Pt unsure   • Shellfish-Derived Products Other (See Comments)     Pt unsure   • Simvastatin Myalgia       Past Surgical History:   Procedure Laterality Date   • CAROTID ENDARTERECTOMY Right    • CORONARY ARTERY BYPASS GRAFT     • JOINT " "REPLACEMENT Left 01/2017    partial hip replacement   • MASTOID SURGERY         Family History   Problem Relation Age of Onset   • No Known Problems Mother    • No Known Problems Father        Social History     Socioeconomic History   • Marital status:    Tobacco Use   • Smoking status: Current Every Day Smoker     Packs/day: 1.00     Years: 56.00     Pack years: 56.00     Types: Cigarettes   • Smokeless tobacco: Never Used   Vaping Use   • Vaping Use: Never used   Substance and Sexual Activity   • Alcohol use: No   • Drug use: No   • Sexual activity: Defer           Objective    Vitals:    02/11/22 2036 02/11/22 2047 02/11/22 2341 02/11/22 2347   BP: 151/67   (!) 195/84  Comment: RN notified   Patient Position: Lying      Pulse: 79  87    Resp: 18      Temp:  97.9 °F (36.6 °C)     TempSrc:  Oral     SpO2: 95%  94%    Weight: 48.5 kg (107 lb)      Height: 160 cm (63\")          Physical Exam  Vitals and nursing note reviewed.   Constitutional:       General: She is not in acute distress.     Appearance: She is well-developed. She is not ill-appearing, toxic-appearing or diaphoretic.   HENT:      Head: Normocephalic.      Right Ear: External ear normal.      Left Ear: External ear normal.   Pulmonary:      Effort: Pulmonary effort is normal. No accessory muscle usage or respiratory distress.      Breath sounds: No wheezing.   Chest:      Chest wall: No tenderness.   Abdominal:      General: Bowel sounds are normal.      Palpations: Abdomen is soft.      Tenderness: There is no abdominal tenderness (deep palpation).   Musculoskeletal:      Cervical back: Normal range of motion.   Skin:     General: Skin is warm and dry.      Capillary Refill: Capillary refill takes less than 2 seconds.   Neurological:      Mental Status: She is alert and oriented to person, place, and time.      Sensory: No sensory deficit.      Motor: No weakness.      Coordination: Coordination normal.   Psychiatric:         Behavior: " Behavior normal.         Procedures           ED Course      Results for orders placed or performed during the hospital encounter of 02/11/22   Comprehensive Metabolic Panel    Specimen: Blood   Result Value Ref Range    Glucose 198 (H) 65 - 99 mg/dL    BUN 13 8 - 23 mg/dL    Creatinine 0.90 0.57 - 1.00 mg/dL    Sodium 135 (L) 136 - 145 mmol/L    Potassium 4.0 3.5 - 5.2 mmol/L    Chloride 97 (L) 98 - 107 mmol/L    CO2 26.0 22.0 - 29.0 mmol/L    Calcium 9.5 8.6 - 10.5 mg/dL    Total Protein 6.5 6.0 - 8.5 g/dL    Albumin 4.60 3.50 - 5.20 g/dL    ALT (SGPT) 6 1 - 33 U/L    AST (SGOT) 12 1 - 32 U/L    Alkaline Phosphatase 66 39 - 117 U/L    Total Bilirubin 0.3 0.0 - 1.2 mg/dL    eGFR Non African Amer 60 (L) >60 mL/min/1.73    Globulin 1.9 gm/dL    A/G Ratio 2.4 g/dL    BUN/Creatinine Ratio 14.4 7.0 - 25.0    Anion Gap 12.0 5.0 - 15.0 mmol/L   Urinalysis With Microscopic If Indicated (No Culture) - Urine, Clean Catch    Specimen: Urine, Clean Catch   Result Value Ref Range    Color, UA Yellow Yellow, Straw, Dark Yellow, Binta    Appearance, UA Clear Clear    pH, UA 7.0 5.0 - 9.0    Specific Gravity, UA 1.007 1.003 - 1.030    Glucose, UA Negative Negative    Ketones, UA Negative Negative    Bilirubin, UA Negative Negative    Blood, UA Negative Negative    Protein, UA 30 mg/dL (1+) (A) Negative    Leuk Esterase, UA Negative Negative    Nitrite, UA Negative Negative    Urobilinogen, UA 0.2 E.U./dL 0.2 - 1.0 E.U./dL   BNP    Specimen: Blood   Result Value Ref Range    proBNP 697.5 0.0-1,800.0 pg/mL   Troponin    Specimen: Blood   Result Value Ref Range    Troponin T <0.010 0.000 - 0.030 ng/mL   Magnesium    Specimen: Blood   Result Value Ref Range    Magnesium 2.0 1.6 - 2.4 mg/dL   CBC Auto Differential    Specimen: Blood   Result Value Ref Range    WBC 13.62 (H) 3.40 - 10.80 10*3/mm3    RBC 4.63 3.77 - 5.28 10*6/mm3    Hemoglobin 14.7 12.0 - 15.9 g/dL    Hematocrit 41.4 34.0 - 46.6 %    MCV 89.4 79.0 - 97.0 fL    MCH 31.7  26.6 - 33.0 pg    MCHC 35.5 31.5 - 35.7 g/dL    RDW 12.2 (L) 12.3 - 15.4 %    RDW-SD 39.4 37.0 - 54.0 fl    MPV 9.6 6.0 - 12.0 fL    Platelets 370 140 - 450 10*3/mm3    Neutrophil % 82.1 (H) 42.7 - 76.0 %    Lymphocyte % 9.8 (L) 19.6 - 45.3 %    Monocyte % 5.8 5.0 - 12.0 %    Eosinophil % 1.2 0.3 - 6.2 %    Basophil % 0.4 0.0 - 1.5 %    Immature Grans % 0.7 (H) 0.0 - 0.5 %    Neutrophils, Absolute 11.19 (H) 1.70 - 7.00 10*3/mm3    Lymphocytes, Absolute 1.33 0.70 - 3.10 10*3/mm3    Monocytes, Absolute 0.79 0.10 - 0.90 10*3/mm3    Eosinophils, Absolute 0.16 0.00 - 0.40 10*3/mm3    Basophils, Absolute 0.06 0.00 - 0.20 10*3/mm3    Immature Grans, Absolute 0.09 (H) 0.00 - 0.05 10*3/mm3    nRBC 0.0 0.0 - 0.2 /100 WBC   Urinalysis, Microscopic Only - Urine, Clean Catch    Specimen: Urine, Clean Catch   Result Value Ref Range    RBC, UA 0-2 (A) None Seen /HPF    WBC, UA 0-2 None Seen, 0-2, 3-5 /HPF    Bacteria, UA None Seen None Seen /HPF    Squamous Epithelial Cells, UA None Seen None Seen, 0-2 /HPF    Hyaline Casts, UA 0-2 None Seen /LPF    Methodology Manual Light Microscopy    Gold Top - SST   Result Value Ref Range    Extra Tube Hold for add-ons.    Light Blue Top   Result Value Ref Range    Extra Tube hold for add-on      XR Chest 1 View   Final Result      No radiographic evidence of acute cardiopulmonary disease.      Electronically signed by:  Fahad Shultz MD  2/11/2022 9:04 PM CST   Workstation: [id number]              MDM  Number of Diagnoses or Management Options  Normal physical exam: new and requires workup  Diagnosis management comments: Vital signs are stable, afebrile.  Labs are unremarkable.  Chest x-ray shows no acute cardiopulmonary processes.  UA is negative for UTI.  Patient is neurologically intact on exam.  She reports feeling fine and would like to go home.  Recommend PCP follow-up and return precautions provided.      Final diagnoses:   Normal physical exam       ED Disposition  ED Disposition     ED  Disposition Condition Comment    Discharge Stable           Ariana Adkins, DO  [address] [facility] [address]  [place] [address]  [phone]    Schedule an appointment as soon as possible for a visit in 2 days  ER follow up         Medication List      No changes were made to your prescriptions during this visit.          Chauncey Garcia MD  02/12/22 0124

## 2022-02-12 NOTE — ED NOTES
Pt attempted to void.  Placed on bedpan.  Unable to void at this time     Kristi Anderson, RN  02/11/22 4145

## 2022-02-15 ENCOUNTER — HOSPITAL ENCOUNTER (OUTPATIENT)
Facility: HOSPITAL | Age: 87
Setting detail: OBSERVATION
Discharge: HOME | End: 2022-02-16
Attending: EMERGENCY MEDICINE | Admitting: FAMILY MEDICINE

## 2022-02-15 ENCOUNTER — APPOINTMENT (OUTPATIENT)
Dept: CT IMAGING | Facility: HOSPITAL | Age: 87
End: 2022-02-15

## 2022-02-15 ENCOUNTER — APPOINTMENT (OUTPATIENT)
Dept: GENERAL RADIOLOGY | Facility: HOSPITAL | Age: 87
End: 2022-02-15

## 2022-02-15 ENCOUNTER — OFFICE VISIT (OUTPATIENT)
Dept: CARDIAC SURGERY | Facility: CLINIC | Age: 87
End: 2022-02-15

## 2022-02-15 VITALS
WEIGHT: 107 LBS | BODY MASS INDEX: 18.96 KG/M2 | DIASTOLIC BLOOD PRESSURE: 100 MMHG | HEIGHT: 63 IN | SYSTOLIC BLOOD PRESSURE: 220 MMHG | HEART RATE: 60 BPM | OXYGEN SATURATION: 98 %

## 2022-02-15 DIAGNOSIS — I65.23 BILATERAL CAROTID ARTERY STENOSIS: Primary | ICD-10-CM

## 2022-02-15 DIAGNOSIS — E78.2 MIXED HYPERLIPIDEMIA: ICD-10-CM

## 2022-02-15 DIAGNOSIS — F17.218 NICOTINE DEPENDENCE, CIGARETTES, WITH OTHER NICOTINE-INDUCED DISORDERS: ICD-10-CM

## 2022-02-15 DIAGNOSIS — I16.0 ASYMPTOMATIC HYPERTENSIVE URGENCY: Primary | ICD-10-CM

## 2022-02-15 DIAGNOSIS — I10 ACCELERATED HYPERTENSION: ICD-10-CM

## 2022-02-15 LAB
ALBUMIN SERPL-MCNC: 4.8 G/DL (ref 3.5–5.2)
ALBUMIN/GLOB SERPL: 2.2 G/DL
ALP SERPL-CCNC: 63 U/L (ref 39–117)
ALT SERPL W P-5'-P-CCNC: 6 U/L (ref 1–33)
ANION GAP SERPL CALCULATED.3IONS-SCNC: 10 MMOL/L (ref 5–15)
AST SERPL-CCNC: 14 U/L (ref 1–32)
BASOPHILS # BLD AUTO: 0.06 10*3/MM3 (ref 0–0.2)
BASOPHILS NFR BLD AUTO: 0.6 % (ref 0–1.5)
BILIRUB SERPL-MCNC: 0.3 MG/DL (ref 0–1.2)
BUN SERPL-MCNC: 13 MG/DL (ref 8–23)
BUN/CREAT SERPL: 16.7 (ref 7–25)
CALCIUM SPEC-SCNC: 9.5 MG/DL (ref 8.6–10.5)
CHLORIDE SERPL-SCNC: 95 MMOL/L (ref 98–107)
CO2 SERPL-SCNC: 25 MMOL/L (ref 22–29)
CREAT SERPL-MCNC: 0.78 MG/DL (ref 0.57–1)
DEPRECATED RDW RBC AUTO: 39.3 FL (ref 37–54)
EOSINOPHIL # BLD AUTO: 0.26 10*3/MM3 (ref 0–0.4)
EOSINOPHIL NFR BLD AUTO: 2.8 % (ref 0.3–6.2)
ERYTHROCYTE [DISTWIDTH] IN BLOOD BY AUTOMATED COUNT: 12.2 % (ref 12.3–15.4)
FLUAV SUBTYP SPEC NAA+PROBE: NOT DETECTED
FLUBV RNA ISLT QL NAA+PROBE: NOT DETECTED
GFR SERPL CREATININE-BSD FRML MDRD: 70 ML/MIN/1.73
GLOBULIN UR ELPH-MCNC: 2.2 GM/DL
GLUCOSE SERPL-MCNC: 107 MG/DL (ref 65–99)
HCT VFR BLD AUTO: 40.6 % (ref 34–46.6)
HGB BLD-MCNC: 14.4 G/DL (ref 12–15.9)
HOLD SPECIMEN: NORMAL
HOLD SPECIMEN: NORMAL
IMM GRANULOCYTES # BLD AUTO: 0.05 10*3/MM3 (ref 0–0.05)
IMM GRANULOCYTES NFR BLD AUTO: 0.5 % (ref 0–0.5)
LYMPHOCYTES # BLD AUTO: 1.94 10*3/MM3 (ref 0.7–3.1)
LYMPHOCYTES NFR BLD AUTO: 20.9 % (ref 19.6–45.3)
MCH RBC QN AUTO: 31.2 PG (ref 26.6–33)
MCHC RBC AUTO-ENTMCNC: 35.5 G/DL (ref 31.5–35.7)
MCV RBC AUTO: 87.9 FL (ref 79–97)
MONOCYTES # BLD AUTO: 0.99 10*3/MM3 (ref 0.1–0.9)
MONOCYTES NFR BLD AUTO: 10.7 % (ref 5–12)
NEUTROPHILS NFR BLD AUTO: 5.98 10*3/MM3 (ref 1.7–7)
NEUTROPHILS NFR BLD AUTO: 64.5 % (ref 42.7–76)
NRBC BLD AUTO-RTO: 0 /100 WBC (ref 0–0.2)
NT-PROBNP SERPL-MCNC: 407.7 PG/ML (ref 0–1800)
PLATELET # BLD AUTO: 325 10*3/MM3 (ref 140–450)
PMV BLD AUTO: 9.2 FL (ref 6–12)
POTASSIUM SERPL-SCNC: 4 MMOL/L (ref 3.5–5.2)
PROT SERPL-MCNC: 7 G/DL (ref 6–8.5)
RBC # BLD AUTO: 4.62 10*6/MM3 (ref 3.77–5.28)
SARS-COV-2 RNA PNL SPEC NAA+PROBE: NOT DETECTED
SODIUM SERPL-SCNC: 130 MMOL/L (ref 136–145)
TROPONIN T SERPL-MCNC: <0.01 NG/ML (ref 0–0.03)
TROPONIN T SERPL-MCNC: <0.01 NG/ML (ref 0–0.03)
TSH SERPL DL<=0.05 MIU/L-ACNC: 3.28 UIU/ML (ref 0.27–4.2)
WBC NRBC COR # BLD: 9.28 10*3/MM3 (ref 3.4–10.8)
WHOLE BLOOD HOLD SPECIMEN: NORMAL

## 2022-02-15 PROCEDURE — 87636 SARSCOV2 & INF A&B AMP PRB: CPT | Performed by: EMERGENCY MEDICINE

## 2022-02-15 PROCEDURE — 80053 COMPREHEN METABOLIC PANEL: CPT | Performed by: EMERGENCY MEDICINE

## 2022-02-15 PROCEDURE — 96375 TX/PRO/DX INJ NEW DRUG ADDON: CPT

## 2022-02-15 PROCEDURE — 93010 ELECTROCARDIOGRAM REPORT: CPT | Performed by: INTERNAL MEDICINE

## 2022-02-15 PROCEDURE — G0378 HOSPITAL OBSERVATION PER HR: HCPCS

## 2022-02-15 PROCEDURE — 96376 TX/PRO/DX INJ SAME DRUG ADON: CPT

## 2022-02-15 PROCEDURE — 25010000002 HYDRALAZINE PER 20 MG: Performed by: EMERGENCY MEDICINE

## 2022-02-15 PROCEDURE — 96366 THER/PROPH/DIAG IV INF ADDON: CPT

## 2022-02-15 PROCEDURE — 84484 ASSAY OF TROPONIN QUANT: CPT | Performed by: EMERGENCY MEDICINE

## 2022-02-15 PROCEDURE — 70450 CT HEAD/BRAIN W/O DYE: CPT

## 2022-02-15 PROCEDURE — 83880 ASSAY OF NATRIURETIC PEPTIDE: CPT | Performed by: EMERGENCY MEDICINE

## 2022-02-15 PROCEDURE — 85025 COMPLETE CBC W/AUTO DIFF WBC: CPT | Performed by: EMERGENCY MEDICINE

## 2022-02-15 PROCEDURE — C9803 HOPD COVID-19 SPEC COLLECT: HCPCS

## 2022-02-15 PROCEDURE — 96365 THER/PROPH/DIAG IV INF INIT: CPT

## 2022-02-15 PROCEDURE — 84443 ASSAY THYROID STIM HORMONE: CPT | Performed by: FAMILY MEDICINE

## 2022-02-15 PROCEDURE — 99214 OFFICE O/P EST MOD 30 MIN: CPT | Performed by: NURSE PRACTITIONER

## 2022-02-15 PROCEDURE — 36415 COLL VENOUS BLD VENIPUNCTURE: CPT | Performed by: EMERGENCY MEDICINE

## 2022-02-15 PROCEDURE — 99285 EMERGENCY DEPT VISIT HI MDM: CPT

## 2022-02-15 PROCEDURE — 71045 X-RAY EXAM CHEST 1 VIEW: CPT

## 2022-02-15 PROCEDURE — 93005 ELECTROCARDIOGRAM TRACING: CPT | Performed by: EMERGENCY MEDICINE

## 2022-02-15 RX ORDER — ONDANSETRON 4 MG/1
4 TABLET, FILM COATED ORAL EVERY 6 HOURS PRN
Status: DISCONTINUED | OUTPATIENT
Start: 2022-02-15 | End: 2022-02-16 | Stop reason: HOSPADM

## 2022-02-15 RX ORDER — LANOLIN ALCOHOL/MO/W.PET/CERES
6 CREAM (GRAM) TOPICAL NIGHTLY PRN
Status: DISCONTINUED | OUTPATIENT
Start: 2022-02-15 | End: 2022-02-16 | Stop reason: HOSPADM

## 2022-02-15 RX ORDER — HYDRALAZINE HYDROCHLORIDE 50 MG/1
50 TABLET, FILM COATED ORAL 3 TIMES DAILY
Status: DISCONTINUED | OUTPATIENT
Start: 2022-02-15 | End: 2022-02-16 | Stop reason: HOSPADM

## 2022-02-15 RX ORDER — SODIUM CHLORIDE 0.9 % (FLUSH) 0.9 %
10 SYRINGE (ML) INJECTION AS NEEDED
Status: DISCONTINUED | OUTPATIENT
Start: 2022-02-15 | End: 2022-02-16 | Stop reason: HOSPADM

## 2022-02-15 RX ORDER — CALCIUM CARBONATE 200(500)MG
2 TABLET,CHEWABLE ORAL 2 TIMES DAILY PRN
Status: DISCONTINUED | OUTPATIENT
Start: 2022-02-15 | End: 2022-02-16 | Stop reason: HOSPADM

## 2022-02-15 RX ORDER — ASPIRIN 81 MG/1
81 TABLET ORAL DAILY
Status: DISCONTINUED | OUTPATIENT
Start: 2022-02-16 | End: 2022-02-16 | Stop reason: HOSPADM

## 2022-02-15 RX ORDER — VALSARTAN 160 MG/1
160 TABLET ORAL 2 TIMES DAILY
Status: DISCONTINUED | OUTPATIENT
Start: 2022-02-15 | End: 2022-02-16 | Stop reason: HOSPADM

## 2022-02-15 RX ORDER — ACETAMINOPHEN 160 MG/5ML
650 SOLUTION ORAL EVERY 4 HOURS PRN
Status: DISCONTINUED | OUTPATIENT
Start: 2022-02-15 | End: 2022-02-16 | Stop reason: HOSPADM

## 2022-02-15 RX ORDER — ONDANSETRON 2 MG/ML
4 INJECTION INTRAMUSCULAR; INTRAVENOUS EVERY 6 HOURS PRN
Status: DISCONTINUED | OUTPATIENT
Start: 2022-02-15 | End: 2022-02-16 | Stop reason: HOSPADM

## 2022-02-15 RX ORDER — ACETAMINOPHEN 650 MG/1
650 SUPPOSITORY RECTAL EVERY 4 HOURS PRN
Status: DISCONTINUED | OUTPATIENT
Start: 2022-02-15 | End: 2022-02-16 | Stop reason: HOSPADM

## 2022-02-15 RX ORDER — ACETAMINOPHEN 325 MG/1
650 TABLET ORAL EVERY 4 HOURS PRN
Status: DISCONTINUED | OUTPATIENT
Start: 2022-02-15 | End: 2022-02-16 | Stop reason: HOSPADM

## 2022-02-15 RX ORDER — SODIUM CHLORIDE 0.9 % (FLUSH) 0.9 %
10 SYRINGE (ML) INJECTION EVERY 12 HOURS SCHEDULED
Status: DISCONTINUED | OUTPATIENT
Start: 2022-02-15 | End: 2022-02-16 | Stop reason: HOSPADM

## 2022-02-15 RX ORDER — CLOPIDOGREL BISULFATE 75 MG/1
75 TABLET ORAL DAILY
Status: DISCONTINUED | OUTPATIENT
Start: 2022-02-16 | End: 2022-02-16 | Stop reason: HOSPADM

## 2022-02-15 RX ORDER — HYDRALAZINE HYDROCHLORIDE 20 MG/ML
20 INJECTION INTRAMUSCULAR; INTRAVENOUS ONCE
Status: COMPLETED | OUTPATIENT
Start: 2022-02-15 | End: 2022-02-15

## 2022-02-15 RX ORDER — LANOLIN ALCOHOL/MO/W.PET/CERES
1000 CREAM (GRAM) TOPICAL DAILY
Status: DISCONTINUED | OUTPATIENT
Start: 2022-02-16 | End: 2022-02-16 | Stop reason: HOSPADM

## 2022-02-15 RX ORDER — CLONIDINE HYDROCHLORIDE 0.1 MG/1
0.1 TABLET ORAL EVERY 6 HOURS PRN
Status: DISCONTINUED | OUTPATIENT
Start: 2022-02-15 | End: 2022-02-16 | Stop reason: HOSPADM

## 2022-02-15 RX ORDER — SODIUM BICARBONATE 325 MG/1
325 TABLET ORAL
Status: DISCONTINUED | OUTPATIENT
Start: 2022-02-15 | End: 2022-02-16 | Stop reason: HOSPADM

## 2022-02-15 RX ORDER — ISOSORBIDE MONONITRATE 30 MG/1
30 TABLET, EXTENDED RELEASE ORAL DAILY
Status: DISCONTINUED | OUTPATIENT
Start: 2022-02-15 | End: 2022-02-16 | Stop reason: HOSPADM

## 2022-02-15 RX ADMIN — HYDRALAZINE HYDROCHLORIDE 20 MG: 20 INJECTION INTRAMUSCULAR; INTRAVENOUS at 15:37

## 2022-02-15 RX ADMIN — SODIUM CHLORIDE 5 MG/HR: 9 INJECTION, SOLUTION INTRAVENOUS at 23:38

## 2022-02-15 RX ADMIN — SODIUM CHLORIDE 5 MG/HR: 9 INJECTION, SOLUTION INTRAVENOUS at 17:53

## 2022-02-15 RX ADMIN — HYDRALAZINE HYDROCHLORIDE 20 MG: 20 INJECTION INTRAMUSCULAR; INTRAVENOUS at 16:15

## 2022-02-15 NOTE — PROGRESS NOTES
Minerva Miles [place]  1935    Chief Complaint   Patient presents with   • Carotid Artery Disease     HPI:      PCP:  Ariana Adkins,      86y.o. female with HTN(stable, increased risk stroke, rupture), Hyperlipidemia(stable, increased risk cardiovascular events), Diabetes Mellitus(stable, increased risk cardiovascular events), Smoker(uncontrolled, increased risk cardiovascular events) and COPD(stable, increased risk pulmonary complications) , PVD(stable, increase risk cardiovascular events), Carotid Stenosis(chronic severe progression, increase risk stroke)  smokes 1/2 PPD.  Moderate leg weakness, balance problems, leg cramps at night.  Limited claudication symptoms.  Uses walker, limited ambulation.  Prior stroke, hip replacement.  Progressed well post TCAR. Returns for follow up. No other associated signs, symptoms or modifying factors.     2/2017 IDALIA:  RIGHT .79 triphasic.  LEFT .76 triphasic.  3/2018 IDALIA:  RIGHT .88 triphasic.  LEFT .85 triphasic.  3/2019 IDALIA:  RIGHT .55 biphasic.  LEFT .62 biphasic.  6/2019 IDALIA:  RIGHT .49 tri/biphasic.  LEFT .75 tri/biphasic.  7/2019 CTA runoff:  Focal dissection distal thoracic aorta, distal aorta 10mm, circumferential calcification.  RIGHT SFA 80%, pop/tibial occlusion.  LEFT EIA 60% bifucation, 80% mid.  SFA moderate diffuse disease, 2v runoff.  10/2019 IDALIA:  RIGHT .43 tri/biphasic.  LEFT .51 biphasic.  6/2020 IDALIA:  RIGHT .66 tri/biphasic.  LEFT .63 tri/biphasic.  8/2021 IDALIA:  RIGHT .63 biphasic.  LEFT .62 biphasic.     2009 LEFT CEA  2016 Stroke  2/2017 Carotid Duplex:  DENVER 0-49% (120/18cm/s, ratio 0.9), LICA >70% (230/36cm/s, ratio 1.8) antegrade verts.  3/2018 Carotid Duplex:  DENVER 0-49% (73/19cm/s, ratio 0.7), LICA 50-69% (186/39cm/s, ratio 1.8) antegrade verts.  3/2019 Carotid Duplex:  DENVER 0-49% (111/22cm/s, ratio 0.8), LICA 50-69% (183/41cm/s, ratio 1.9) antegrade verts.  8/2021 Carotid Duplex:  DENVER 0-49% (123/14cm/s, ratio 1.0), LICA 50-69% (293/63cm/s,  ratio 3.4) antegrade verts.  8/2021 CTA Carotids:  DENVER no significant obstruction.  LICA 90%  10/13/21: LEFT TCAR   11/2021 Carotid Duplex:  LICA 0-49% mPSV 93c/s Ratio 1.3, Antegrade vertebrals  2/2022 Carotid Duplex: DENVER 0-49% mPSV 121c/s Ratio 1.8, LICA 0-49% mPSV 122c/s Ratio 1.4, Antegrade vertebrals    10/2018 ECG:  , QTc 440  10/2018 Echocardiogram:  EF 55%, LA 34mm, LV 33mm, RVSP 33mmHg.   Mild MR TR.    The following portions of the patient's history were reviewed and updated as appropriate: allergies, current medications, past family history, past medical history, past social history, past surgical history and problem list.  Recent images independently reviewed.  Available laboratory values reviewed.    PMH:  Past Medical History:   Diagnosis Date   • Appetite loss    • Arthritis    • Coronary artery disease    • Coughing    • Essential hypertension    • Hyperlipidemia    • Leg pain    • Muscle weakness    • Postmenopausal bleeding     With thickened endometrial stripe    • Stroke (HCC) 02/2016     Past Surgical History:   Procedure Laterality Date   • CAROTID ENDARTERECTOMY Right    • CORONARY ARTERY BYPASS GRAFT     • JOINT REPLACEMENT Left 01/2017    partial hip replacement   • MASTOID SURGERY       Family History   Problem Relation Age of Onset   • No Known Problems Mother    • No Known Problems Father      Social History     Tobacco Use   • Smoking status: Current Every Day Smoker     Packs/day: 1.00     Years: 56.00     Pack years: 56.00     Types: Cigarettes   • Smokeless tobacco: Never Used   Vaping Use   • Vaping Use: Never used   Substance Use Topics   • Alcohol use: No   • Drug use: No       ALLERGIES:  Allergies   Allergen Reactions   • Amoxicillin Other (See Comments)     Pt unsure   • Erythromycin Base Other (See Comments)     Pt unsure   • Iodine Other (See Comments)     Pt unsure   • Shellfish-Derived Products Other (See Comments)     Pt unsure   • Simvastatin Myalgia          MEDICATIONS:    Current Outpatient Medications:   •  aspirin 81 MG tablet, Take 81 mg by mouth Daily., Disp: , Rfl:   •  clopidogrel (PLAVIX) 75 MG tablet, Take 75 mg by mouth Daily., Disp: , Rfl:   •  Evolocumab (Repatha) solution prefilled syringe injection, Inject 1 mL under the skin into the appropriate area as directed Every 14 (Fourteen) Days., Disp: 1 mL, Rfl: 5  •  hydrALAZINE (APRESOLINE) 25 MG tablet, Take 50 mg by mouth 3 (Three) Times a Day., Disp: , Rfl:   •  isosorbide mononitrate (IMDUR) 30 MG 24 hr tablet, Take 30 mg by mouth Daily., Disp: , Rfl:   •  Loratadine 10 MG capsule, Take 10 mg by mouth Daily As Needed. Allergy relief, Disp: , Rfl:   •  METOPROLOL TARTRATE PO, Take 25 mg by mouth 2 (Two) Times a Day., Disp: , Rfl:   •  Omega-3 Fatty Acids (OMEGA-3 FISH OIL) 1000 MG capsule, Take 1,000 mg by mouth Daily., Disp: , Rfl:   •  sodium bicarbonate 650 MG tablet, Take 325 mg by mouth 4 (Four) Times a Week. L-USS-LODT-SAT, Disp: , Rfl:   •  valsartan (DIOVAN) 160 MG tablet, Take 160 mg by mouth 2 (Two) Times a Day., Disp: , Rfl:   •  vitamin B-12 (CYANOCOBALAMIN) 1000 MCG tablet, Place 1,000 mcg under the tongue Daily., Disp: , Rfl:     Current Facility-Administered Medications:   •  Evolocumab (REPATHA) SureClick injection 140 mg, 140 mg, Subcutaneous, Once, Coco Perla APRN    Review of Systems   Review of Systems   Constitutional: Positive for malaise/fatigue. Negative for weight loss.   HENT: Positive for hearing loss.    Cardiovascular: Positive for dyspnea on exertion. Negative for chest pain and claudication.   Respiratory: Negative for cough and shortness of breath.    Hematologic/Lymphatic: Bruises/bleeds easily.   Skin: Negative for color change and poor wound healing.   Musculoskeletal: Positive for back pain, falls, muscle cramps and stiffness.   Neurological: Positive for dizziness, loss of balance, numbness and paresthesias. Negative for weakness.       Physical Exam  "  Vitals:    02/15/22 1416 02/15/22 1433   BP: (!) 190/90 (!) 220/100   BP Location: Left arm    Patient Position: Sitting    Cuff Size: Adult    Pulse: 60    SpO2: 98%    Weight: 48.5 kg (107 lb)    Height: 160 cm (63\")      BP recheck initial by provider 230/110    Patient rested 15 minutes in reclined position recheck 224/110    Body surface area is 1.48 meters squared.  Body mass index is 18.95 kg/m².  Physical Exam  Constitutional:       General: She is not in acute distress.     Appearance: She is not ill-appearing.   HENT:      Right Ear: Hearing normal.      Left Ear: Hearing normal.      Nose: No nasal deformity.      Mouth/Throat:      Dentition: Normal dentition. Does not have dentures.   Neck:      Comments: (L) clavicle Inc: CDI  Cardiovascular:      Rate and Rhythm: Normal rate and regular rhythm.      Pulses:           Carotid pulses are 2+ on the right side and 2+ on the left side.       Radial pulses are 2+ on the right side and 2+ on the left side.        Posterior tibial pulses are 1+ on the right side and 1+ on the left side.      Heart sounds: No murmur heard.      Pulmonary:      Effort: Pulmonary effort is normal.      Breath sounds: Normal breath sounds.   Abdominal:      General: There is no distension.      Palpations: Abdomen is soft. There is no mass.      Tenderness: There is no abdominal tenderness.   Musculoskeletal:         General: No deformity.      Comments: Gait normal.    Skin:     General: Skin is warm and dry.      Coloration: Skin is not pale.      Findings: No erythema.      Comments: No venous staining   Neurological:      General: No focal deficit present.      Mental Status: She is alert and oriented to person, place, and time.   Psychiatric:         Speech: Speech normal.         Behavior: Behavior is cooperative.         Thought Content: Thought content normal.         Judgment: Judgment normal.         BUN   Date Value Ref Range Status   02/11/2022 13 8 - 23 mg/dL Final "     Creatinine   Date Value Ref Range Status   02/11/2022 0.90 0.57 - 1.00 mg/dL Final     eGFR Non  Amer   Date Value Ref Range Status   02/11/2022 60 (L) >60 mL/min/1.73 Final       ASSESSMENT:PLAN  2/2022 Carotid Duplex: DENVER 0-49% mPSV 121c/s Ratio 1.8, LICA 0-49% mPSV 122c/s Ratio 1.4, Antegrade vertebrals    1. Bilateral carotid artery stenosis  L ICA stent patent, significant improvement in velocities post procedure.      No new deficits did have absence episode for which she was seen in ED for last week.     Return 6 months repeat bilateral duplex    ASA, plavix, Repatha    2. Mixed hyperlipidemia  Lipid-lowering therapy has been proven beneficial in patients with cardio-vascular disease. Current guidelines recommend statin treatment for all patients with PAD,CAD and carotid stenosis. Statins are beneficial in preventing cardiovascular events, increasing functional capacity and lower the risk of adverse limb loss in PAD. Statins decrease the progression of plaque formation and may improve peripheral vessel lining, and aid in reversing atherosclerosis.    3. Nicotine dependence, cigarettes, with other nicotine-induced disorders  Smoking cessation assistance options offered including behavioral counseling (Smoking Cessation Classes), Nicotine replacement therapy (patches or gum), pharmacologic therapy (Chantix, Wellbutrin). Understands tobacco increases risk of expanding AAA, MI, CVA, PAD, carcinoma. Discussion and question answer period 5-7 minutes.    4. Accelerated hypertension  Initial recheck by provider 230/110   Patient was rested in office for 15 minutes recheck 224/110    Both manual checks.     Remains compliant on valsartan, BB, nitrate, hydralazine.  Has taken medications today.      Patient agreeable for ED evaluation, transported via wheelchair to registration               This document has been electronically signed by REMI Alonzo on February 15, 2022 15:03 CST

## 2022-02-15 NOTE — ED PROVIDER NOTES
Subjective   Patient presents emergency department complaint of hypertension.  Patient evidently went to the clinic today to have her blood pressure medications adjusted secondary to high numbers at home.  Patient was noted to be with baby over the baby.  Initial evaluation in the blood pressure seem to go higher 15 minutes later 220/100.  Patient sent to the ED.  Patient's blood pressure was high here as well the patient continues asymptomatic at this time.  Patient notes no headache, no chest pain, no shortness of breath, no dizziness.  Patient's family states that she has had a problem from time to time to being locked in or having a transient alteration of her consciousness lasting for seconds where she seems to be unresponsive and then comes back.  She has had this worked up with CT scans and the family thinks that she may be having TIAs.  Patient has had no focal neurologic deficits today nor symptoms of the same.          Review of Systems   Constitutional: Negative.  Negative for appetite change, chills and fever.   HENT: Negative.  Negative for congestion.    Eyes: Negative.  Negative for photophobia and visual disturbance.   Respiratory: Negative.  Negative for cough, chest tightness and shortness of breath.    Cardiovascular: Negative.  Negative for chest pain and palpitations.   Gastrointestinal: Negative.  Negative for abdominal pain, constipation, diarrhea, nausea and vomiting.   Endocrine: Negative.    Genitourinary: Negative.  Negative for decreased urine volume, dysuria, flank pain and hematuria.   Musculoskeletal: Negative.  Negative for arthralgias, back pain, myalgias, neck pain and neck stiffness.   Skin: Negative.  Negative for pallor.   Neurological: Negative.  Negative for dizziness, syncope, weakness, light-headedness, numbness and headaches.   Psychiatric/Behavioral: Negative.  Negative for confusion and suicidal ideas. The patient is not nervous/anxious.    All other systems reviewed and  are negative.      Past Medical History:   Diagnosis Date   • Appetite loss    • Arthritis    • Coronary artery disease    • Coughing    • Essential hypertension    • Hyperlipidemia    • Leg pain    • Muscle weakness    • Postmenopausal bleeding     With thickened endometrial stripe    • Stroke ([facility]) 02/2016       Allergies   Allergen Reactions   • Amoxicillin Other (See Comments)     Pt unsure   • Erythromycin Base Other (See Comments)     Pt unsure   • Iodine Other (See Comments)     Pt unsure   • Shellfish-Derived Products Other (See Comments)     Pt unsure   • Simvastatin Myalgia       Past Surgical History:   Procedure Laterality Date   • CAROTID ENDARTERECTOMY Right    • CORONARY ARTERY BYPASS GRAFT     • JOINT REPLACEMENT Left 01/2017    partial hip replacement   • MASTOID SURGERY         Family History   Problem Relation Age of Onset   • No Known Problems Mother    • No Known Problems Father        Social History     Socioeconomic History   • Marital status:    Tobacco Use   • Smoking status: Current Every Day Smoker     Packs/day: 1.00     Years: 56.00     Pack years: 56.00     Types: Cigarettes   • Smokeless tobacco: Never Used   Vaping Use   • Vaping Use: Never used   Substance and Sexual Activity   • Alcohol use: No   • Drug use: No   • Sexual activity: Defer           Objective   Physical Exam  Vitals and nursing note reviewed.   Constitutional:       General: She is not in acute distress.     Appearance: Normal appearance. She is well-developed. She is not ill-appearing or diaphoretic.   HENT:      Head: Normocephalic and atraumatic.      Nose: Nose normal.      Mouth/Throat:      Mouth: Mucous membranes are moist.   Eyes:      General: No scleral icterus.     Extraocular Movements: Extraocular movements intact.      Conjunctiva/sclera: Conjunctivae normal.   Neck:      Vascular: No JVD.   Cardiovascular:      Rate and Rhythm: Normal rate and regular rhythm.      Heart sounds: Normal heart  sounds. No murmur heard.  No friction rub. No gallop.    Pulmonary:      Effort: Pulmonary effort is normal. No respiratory distress.      Breath sounds: No wheezing or rales.   Chest:      Chest wall: No tenderness.   Abdominal:      General: There is no distension.      Palpations: Abdomen is soft. There is no mass.      Tenderness: There is no abdominal tenderness. There is no guarding or rebound.   Musculoskeletal:         General: No tenderness or deformity. Normal range of motion.      Cervical back: Normal range of motion and neck supple.   Lymphadenopathy:      Cervical: No cervical adenopathy.   Skin:     General: Skin is warm and dry.      Capillary Refill: Capillary refill takes less than 2 seconds.      Coloration: Skin is not pale.      Findings: No erythema or rash.   Neurological:      General: No focal deficit present.      Mental Status: She is alert and oriented to person, place, and time.      Cranial Nerves: No cranial nerve deficit.      Motor: No abnormal muscle tone.   Psychiatric:         Behavior: Behavior normal.         Thought Content: Thought content normal.         Judgment: Judgment normal.         Procedures           ED Course                                         Labs Reviewed   COMPREHENSIVE METABOLIC PANEL - Abnormal; Notable for the following components:       Result Value    Glucose 107 (*)     Sodium 130 (*)     Chloride 95 (*)     All other components within normal limits    Narrative:     GFR Normal >60  Chronic Kidney Disease <60  Kidney Failure <15     CBC WITH AUTO DIFFERENTIAL - Abnormal; Notable for the following components:    RDW 12.2 (*)     Monocytes, Absolute 0.99 (*)     All other components within normal limits   TROPONIN (IN-HOUSE) - Normal    Narrative:     Troponin T Reference Range:  <= 0.03 ng/mL-   Negative for AMI  >0.03 ng/mL-     Abnormal for myocardial necrosis.  Clinicians would have to utilize clinical acumen, EKG, Troponin and serial changes to  determine if it is an Acute Myocardial Infarction or myocardial injury due to an underlying chronic condition.       Results may be falsely decreased if patient taking Biotin.     BNP (IN-HOUSE) - Normal    Narrative:     Among patients with dyspnea, NT-proBNP is highly sensitive for the detection of acute congestive heart failure. In addition NT-proBNP of <300 pg/ml effectively rules out acute congestive heart failure with 99% negative predictive value.    Results may be falsely decreased if patient taking Biotin.     COVID-19 AND FLU A/B, NP SWAB IN TRANSPORT MEDIA 8-12 HR TAT   RAINBOW DRAW    Narrative:     The following orders were created for panel order Mobile Draw.  Procedure                               Abnormality         Status                     ---------                               -----------         ------                     Green Top (Gel)[[id number]]                                  Final result               Lavender Top[[id number]]                                     Final result               Gold Top - SST[[id number]]                                   Final result               Light Blue Top[[id number]]                                                                Please view results for these tests on the individual orders.   TROPONIN (IN-HOUSE)   CBC AND DIFFERENTIAL    Narrative:     The following orders were created for panel order CBC & Differential.  Procedure                               Abnormality         Status                     ---------                               -----------         ------                     CBC Auto Differential[[id number]]        Abnormal            Final result                 Please view results for these tests on the individual orders.   GREEN TOP   LAVENDER TOP   GOLD TOP - SST       CT Head Without Contrast   Final Result   1. Rather extensive chronic small vessel ischemic change.   2. Remote right cerebellar infarcts and bilateral basal ganglia    lacunar infarcts.   3. No acute intracranial process.      Electronically signed by:  Otto Wong MD  2/15/2022 4:51 PM CST   Workstation: [id number]      XR Chest 1 View   Final Result   Stable exam without acute cardiac pulmonary process.      Electronically signed by:  Otto Wong MD  2/15/2022 4:16 PM CST   Workstation: [id number]                MDM    Final diagnoses:   Asymptomatic hypertensive urgency       ED Disposition  ED Disposition     ED Disposition Condition Comment    Decision to Admit  Level of Care: Stepdown [25]   Diagnosis: Asymptomatic hypertensive urgency [[id number]]   Admitting Physician: JENNY PIERSON [[id number]]   Attending Physician: JENNY PIERSON [[id number]]            No follow-up provider specified.       Medication List      No changes were made to your prescriptions during this visit.          Franki Cuenca MD  02/15/22 [id number]

## 2022-02-15 NOTE — H&P
[facility] Admission      Date of Admission: 2/15/2022      Primary Care Physician: Ariana Adkins DO      Chief Complaint: Uncontrolled hypertension    HPI: Patient is a 86-year-old female with past medical history notable for CAD, hypertension, and hyperlipidemia presented to the emergency department after being seen in the vascular surgery clinic and noted to have uncontrolled hypertension.  Patient reports compliance with her medications and states that she has not missed any dosing recently.  Patient's son is at bedside and he states that she also has had problems with her blood pressure fluctuating in the past and going low at times as well as becoming uncontrolled.  Patient currently denies any headaches, nausea, vomiting, or visual changes.  She states that she otherwise feels like her normal self at this time.  In the emergency department her initial blood pressure was noted to be quite elevated 270/110, she was given IV hydralazine with some improvement down to 228/92 but her blood pressures failed to improve further.  Cardene drip was initiated and admission was requested for further ongoing care.    Concurrent Medical History:  has a past medical history of Appetite loss, Arthritis, Coronary artery disease, Coughing, Essential hypertension, Hyperlipidemia, Leg pain, Muscle weakness, Postmenopausal bleeding, and Stroke (HCC) (02/2016).    Past Surgical History:  has a past surgical history that includes Carotid endarterectomy (Right); Mastoid surgery; Joint replacement (Left, 01/2017); and Coronary artery bypass graft.    Family History: family history includes No Known Problems in her father and mother. No changes    Social History:  reports that she has been smoking cigarettes. She has a 56.00 pack-year smoking history. She has never used smokeless tobacco. She reports that she does not drink alcohol and does not use drugs.    Allergies:   Allergies    Allergen Reactions   • Amoxicillin Other (See Comments)     Pt unsure   • Erythromycin Base Other (See Comments)     Pt unsure   • Iodine Other (See Comments)     Pt unsure   • Shellfish-Derived Products Other (See Comments)     Pt unsure   • Simvastatin Myalgia       Medications:   Prior to Admission medications    Medication Sig Start Date End Date Taking? Authorizing Provider   aspirin 81 MG tablet Take 81 mg by mouth Daily.    Prerna Frazier MD   clopidogrel (PLAVIX) 75 MG tablet Take 75 mg by mouth Daily.    Prerna Frazier MD   Evolocumab (Repatha) solution prefilled syringe injection Inject 1 mL under the skin into the appropriate area as directed Every 14 (Fourteen) Days. 10/22/21   Coco Perla APRN   hydrALAZINE (APRESOLINE) 25 MG tablet Take 50 mg by mouth 3 (Three) Times a Day. 1/24/17   Prerna Frazier MD   isosorbide mononitrate (IMDUR) 30 MG 24 hr tablet Take 30 mg by mouth Daily.    Prerna Frazier MD   Loratadine 10 MG capsule Take 10 mg by mouth Daily As Needed. Allergy relief    Prerna Frazier MD   METOPROLOL TARTRATE PO Take 25 mg by mouth 2 (Two) Times a Day.    Prerna Frazier MD   Omega-3 Fatty Acids (OMEGA-3 FISH OIL) 1000 MG capsule Take 1,000 mg by mouth Daily.    Prerna Frazier MD   sodium bicarbonate 650 MG tablet Take 325 mg by mouth 4 (Four) Times a Week. [id number]    Prerna Frazier MD   valsartan (DIOVAN) 160 MG tablet Take 160 mg by mouth 2 (Two) Times a Day.    Prerna Frazier MD   vitamin B-12 (CYANOCOBALAMIN) 1000 MCG tablet Place 1,000 mcg under the tongue Daily.    Prerna Frazier MD       Review of Systems:  Review of Systems   Constitutional: Negative for activity change, chills, fatigue and fever.   HENT: Negative for congestion.    Eyes: Negative for visual disturbance.   Respiratory: Negative for shortness of breath.    Cardiovascular: Negative for chest pain and palpitations.   Gastrointestinal:  Negative for abdominal pain, nausea and vomiting.   Musculoskeletal: Negative.    Skin: Negative.    Neurological: Negative.  Negative for numbness and headaches.   Psychiatric/Behavioral: Negative.    All other systems reviewed and are negative.     Otherwise complete ROS is negative except as mentioned above.    Physical Exam:   Temp:  [98.1 °F (36.7 °C)] 98.1 °F (36.7 °C)  Heart Rate:  [60-80] 80  Resp:  [16-20] 16  BP: (190-270)/() 224/78  Physical Exam  Constitutional:       General: She is not in acute distress.     Appearance: She is not toxic-appearing.   HENT:      Head: Normocephalic and atraumatic.      Right Ear: External ear normal.      Left Ear: External ear normal.      Nose: Nose normal.      Mouth/Throat:      Mouth: Mucous membranes are moist.      Pharynx: Oropharynx is clear.   Eyes:      Conjunctiva/sclera: Conjunctivae normal.   Cardiovascular:      Rate and Rhythm: Normal rate and regular rhythm.      Pulses: Normal pulses.      Heart sounds: Normal heart sounds.   Pulmonary:      Effort: Pulmonary effort is normal. No respiratory distress.      Breath sounds: Normal breath sounds.   Abdominal:      General: Bowel sounds are normal.      Palpations: Abdomen is soft.      Tenderness: There is no abdominal tenderness.   Musculoskeletal:         General: No swelling.      Cervical back: Neck supple.   Neurological:      General: No focal deficit present.      Mental Status: She is alert and oriented to person, place, and time. Mental status is at baseline.   Psychiatric:         Behavior: Behavior normal.         Thought Content: Thought content normal.           Results Reviewed:  I have personally reviewed current lab, radiology, and data and agree with results.  Lab Results (last 24 hours)     Procedure Component Value Units Date/Time    [place] Draw [[id number]] Collected: 02/15/22 1536    Specimen: Blood Updated: 02/15/22 1645    Narrative:      The following orders were created for  panel order [place] Draw.  Procedure                               Abnormality         Status                     ---------                               -----------         ------                     Green Top (Gel)[[id number]]                                  Final result               Lavender Top[[id number]]                                     Final result               Gold Top - SST[[id number]]                                   Final result               Light Blue Top[[id number]]                                                                Please view results for these tests on the individual orders.    Green Top (Gel) [[id number]] Collected: 02/15/22 1536    Specimen: Blood Updated: 02/15/22 1645     Extra Tube Hold for add-ons.     Comment: Auto resulted.       Lavender Top [[id number]] Collected: 02/15/22 1536    Specimen: Blood Updated: 02/15/22 1645     Extra Tube hold for add-on     Comment: Auto resulted       Gold Top - SST [[id number]] Collected: 02/15/22 1536    Specimen: Blood Updated: 02/15/22 1645     Extra Tube Hold for add-ons.     Comment: Auto resulted.       Comprehensive Metabolic Panel [[id number]]  (Abnormal) Collected: 02/15/22 1536    Specimen: Blood Updated: 02/15/22 1600     Glucose 107 mg/dL      BUN 13 mg/dL      Creatinine 0.78 mg/dL      Sodium 130 mmol/L      Potassium 4.0 mmol/L      Chloride 95 mmol/L      CO2 25.0 mmol/L      Calcium 9.5 mg/dL      Total Protein 7.0 g/dL      Albumin 4.80 g/dL      ALT (SGPT) 6 U/L      AST (SGOT) 14 U/L      Alkaline Phosphatase 63 U/L      Total Bilirubin 0.3 mg/dL      eGFR Non African Amer 70 mL/min/1.73      Globulin 2.2 gm/dL      A/G Ratio 2.2 g/dL      BUN/Creatinine Ratio 16.7     Anion Gap 10.0 mmol/L     Narrative:      GFR Normal >60  Chronic Kidney Disease <60  Kidney Failure <15      Troponin [[id number]]  (Normal) Collected: 02/15/22 1536    Specimen: Blood Updated: 02/15/22 1559     Troponin T <0.010 ng/mL     Narrative:      Troponin  T Reference Range:  <= 0.03 ng/mL-   Negative for AMI  >0.03 ng/mL-     Abnormal for myocardial necrosis.  Clinicians would have to utilize clinical acumen, EKG, Troponin and serial changes to determine if it is an Acute Myocardial Infarction or myocardial injury due to an underlying chronic condition.       Results may be falsely decreased if patient taking Biotin.      BNP [[id number]]  (Normal) Collected: 02/15/22 1536    Specimen: Blood Updated: 02/15/22 1558     proBNP 407.7 pg/mL     Narrative:      Among patients with dyspnea, NT-proBNP is highly sensitive for the detection of acute congestive heart failure. In addition NT-proBNP of <300 pg/ml effectively rules out acute congestive heart failure with 99% negative predictive value.    Results may be falsely decreased if patient taking Biotin.      CBC & Differential [[id number]]  (Abnormal) Collected: 02/15/22 1536    Specimen: Blood Updated: 02/15/22 1542    Narrative:      The following orders were created for panel order CBC & Differential.  Procedure                               Abnormality         Status                     ---------                               -----------         ------                     CBC Auto Differential[[id number]]        Abnormal            Final result                 Please view results for these tests on the individual orders.    CBC Auto Differential [[id number]]  (Abnormal) Collected: 02/15/22 1536    Specimen: Blood Updated: 02/15/22 1542     WBC 9.28 10*3/mm3      RBC 4.62 10*6/mm3      Hemoglobin 14.4 g/dL      Hematocrit 40.6 %      MCV 87.9 fL      MCH 31.2 pg      MCHC 35.5 g/dL      RDW 12.2 %      RDW-SD 39.3 fl      MPV 9.2 fL      Platelets 325 10*3/mm3      Neutrophil % 64.5 %      Lymphocyte % 20.9 %      Monocyte % 10.7 %      Eosinophil % 2.8 %      Basophil % 0.6 %      Immature Grans % 0.5 %      Neutrophils, Absolute 5.98 10*3/mm3      Lymphocytes, Absolute 1.94 10*3/mm3      Monocytes, Absolute 0.99  10*3/mm3      Eosinophils, Absolute 0.26 10*3/mm3      Basophils, Absolute 0.06 10*3/mm3      Immature Grans, Absolute 0.05 10*3/mm3      nRBC 0.0 /100 WBC         Imaging Results (Last 24 Hours)     Procedure Component Value Units Date/Time    CT Head Without Contrast [[id number]] Collected: 02/15/22 1629     Updated: 02/15/22 1654    Narrative:      EXAM: CT HEAD WITHOUT CONTRAST    CLINICAL HISTORY:  86 years Female,tia    COMPARISON:  None    Axial with coronal and sagittal images were obtained without  intravenous contrast.  This examination was performed according  to our departmental dose optimization program which includes  automated exposure control, adjustment of the MA and kV according  to patient size, and/or use of iterative reconstruction  technique.    FINDINGS:  Low-attenuation in the bilateral periventricular/deep cerebral  white matter, most compatible with relatively extensive chronic  small vessel ischemic change. Chronic lacunar infarcts in the  bilateral basal ganglia. There also are small infarcts in the  right cerebellar hemisphere, chronic in appearance. Mild  generalized cerebral volume loss. No acute intracranial  hemorrhage, mass effect, hydrocephalus, or CT signs of acute  infarct.    The visualized paranasal sinuses and the mastoid air cells are  clear.      Impression:      1. Rather extensive chronic small vessel ischemic change.  2. 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  3. No acute intracranial process.    Electronically signed by:  Otto Wong MD  2/15/2022 4:51 PM [facility]  Workstation: [id number]    XR Chest 1 View [[id number]] Collected: 02/15/22 1534     Updated: 02/15/22 1618    Narrative:      EXAMINATION:  XR CHEST 1 VW    CLINICAL HISTORY:  86 years Female,Chest pain protocol    COMPARISON:  Chest x-ray dated 2/11/2022    FINDINGS:  Sternotomy. Borderline heart size. Normal pulmonary  vascularity. Evidence of a prior granulomatous  process. No focal  consolidation, pleural effusion, or pneumothorax. No significant  change relative to 2/11/2022.      Impression:      Stable exam without acute cardiac pulmonary process.    Electronically signed by:  Otto Wong MD  2/15/2022 4:16 PM CST  Workstation: [id number]            Assessment:    Active Hospital Problems    Diagnosis    • Asymptomatic hypertensive urgency              Plan:  -Patient will be admitted for ongoing care  -We will continue with Cardene infusion for now  -We will also have as needed dosing of clonidine available for her blood pressures if required.  -We will resume her home medications as appropriate  -Given her difficulties with having some labile blood pressures as well as relative hypotension previously for now we will observe and see if she will have improvement with better control of her blood pressures.  -Echocardiogram and renal artery evaluation will be obtained  -DVT prophylaxis with Lovenox  -CODE STATUS: Full    I confirmed that the patient's Advance Care Plan is present, code status is documented, or surrogate decision maker is listed in the patient's medical record.     I have utilized all available immediate resources to obtain, update, or review the patient's current medications.     I discussed the patient's findings and my recommendations with: The patient and her son at bedside    Martinez Burrell MD

## 2022-02-15 NOTE — ED NOTES
Pt arrives to ER sent from PCP office for eval of high bp. Pt denies ALMONTE, CP or SOB. Pt states she is due for her bp medications soon. Pt is alert and oriented at this time. Pt moves all of her extremities at baseline level.      Hien Mcleod, RN  02/15/22 [id number]

## 2022-02-16 ENCOUNTER — APPOINTMENT (OUTPATIENT)
Dept: ULTRASOUND IMAGING | Facility: HOSPITAL | Age: 87
End: 2022-02-16

## 2022-02-16 ENCOUNTER — APPOINTMENT (OUTPATIENT)
Dept: CARDIOLOGY | Facility: HOSPITAL | Age: 87
End: 2022-02-16

## 2022-02-16 VITALS
OXYGEN SATURATION: 99 % | RESPIRATION RATE: 22 BRPM | BODY MASS INDEX: 21.48 KG/M2 | HEART RATE: 62 BPM | TEMPERATURE: 97.5 F | DIASTOLIC BLOOD PRESSURE: 77 MMHG | WEIGHT: 121.25 LBS | SYSTOLIC BLOOD PRESSURE: 162 MMHG | HEIGHT: 63 IN

## 2022-02-16 LAB
ALBUMIN SERPL-MCNC: 4 G/DL (ref 3.5–5.2)
ALBUMIN/GLOB SERPL: 2 G/DL
ALP SERPL-CCNC: 57 U/L (ref 39–117)
ALT SERPL W P-5'-P-CCNC: 5 U/L (ref 1–33)
ANION GAP SERPL CALCULATED.3IONS-SCNC: 9 MMOL/L (ref 5–15)
AST SERPL-CCNC: 11 U/L (ref 1–32)
BASOPHILS # BLD AUTO: 0.05 10*3/MM3 (ref 0–0.2)
BASOPHILS NFR BLD AUTO: 0.6 % (ref 0–1.5)
BH CV ECHO MEAS - AO MAX PG (FULL): 1.8 MMHG
BH CV ECHO MEAS - AO MAX PG: 5.7 MMHG
BH CV ECHO MEAS - AO MEAN PG (FULL): 1 MMHG
BH CV ECHO MEAS - AO MEAN PG: 3 MMHG
BH CV ECHO MEAS - AO V2 MAX: 119 CM/SEC
BH CV ECHO MEAS - AO V2 MEAN: 86.8 CM/SEC
BH CV ECHO MEAS - AO V2 VTI: 30.7 CM
BH CV ECHO MEAS - ASC AORTA: 2.7 CM
BH CV ECHO MEAS - AVA(I,A): 2.3 CM^2
BH CV ECHO MEAS - AVA(I,D): 2.3 CM^2
BH CV ECHO MEAS - AVA(V,A): 2.3 CM^2
BH CV ECHO MEAS - AVA(V,D): 2.3 CM^2
BH CV ECHO MEAS - BSA(HAYCOCK): 1.6 M^2
BH CV ECHO MEAS - BSA: 1.6 M^2
BH CV ECHO MEAS - BZI_BMI: 21.4 KILOGRAMS/M^2
BH CV ECHO MEAS - BZI_METRIC_HEIGHT: 160 CM
BH CV ECHO MEAS - BZI_METRIC_WEIGHT: 54.9 KG
BH CV ECHO MEAS - EDV(CUBED): 35.6 ML
BH CV ECHO MEAS - EDV(MOD-SP2): 18.9 ML
BH CV ECHO MEAS - EDV(MOD-SP4): 41.3 ML
BH CV ECHO MEAS - EDV(TEICH): 43.8 ML
BH CV ECHO MEAS - EF(CUBED): 88.7 %
BH CV ECHO MEAS - EF(MOD-SP2): 56.4 %
BH CV ECHO MEAS - EF(MOD-SP4): 72.2 %
BH CV ECHO MEAS - EF(TEICH): 83.9 %
BH CV ECHO MEAS - ESV(CUBED): 4 ML
BH CV ECHO MEAS - ESV(MOD-SP2): 8.2 ML
BH CV ECHO MEAS - ESV(MOD-SP4): 11.5 ML
BH CV ECHO MEAS - ESV(TEICH): 7.1 ML
BH CV ECHO MEAS - FS: 51.7 %
BH CV ECHO MEAS - IVS/LVPW: 0.95
BH CV ECHO MEAS - IVSD: 1.2 CM
BH CV ECHO MEAS - LA DIMENSION: 3.3 CM
BH CV ECHO MEAS - LV DIASTOLIC VOL/BSA (35-75): 26.4 ML/M^2
BH CV ECHO MEAS - LV MASS(C)D: 122.6 GRAMS
BH CV ECHO MEAS - LV MASS(C)DI: 78.5 GRAMS/M^2
BH CV ECHO MEAS - LV MAX PG: 3.8 MMHG
BH CV ECHO MEAS - LV MEAN PG: 2 MMHG
BH CV ECHO MEAS - LV SYSTOLIC VOL/BSA (12-30): 7.4 ML/M^2
BH CV ECHO MEAS - LV V1 MAX: 97.9 CM/SEC
BH CV ECHO MEAS - LV V1 MEAN: 67.3 CM/SEC
BH CV ECHO MEAS - LV V1 VTI: 24.6 CM
BH CV ECHO MEAS - LVIDD: 3.3 CM
BH CV ECHO MEAS - LVIDS: 1.6 CM
BH CV ECHO MEAS - LVLD AP2: 5.8 CM
BH CV ECHO MEAS - LVLD AP4: 6.5 CM
BH CV ECHO MEAS - LVLS AP2: 4.6 CM
BH CV ECHO MEAS - LVLS AP4: 4.7 CM
BH CV ECHO MEAS - LVOT AREA (M): 2.8 CM^2
BH CV ECHO MEAS - LVOT AREA: 2.8 CM^2
BH CV ECHO MEAS - LVOT DIAM: 1.9 CM
BH CV ECHO MEAS - LVPWD: 1.2 CM
BH CV ECHO MEAS - MR MAX PG: 66.6 MMHG
BH CV ECHO MEAS - MR MAX VEL: 408 CM/SEC
BH CV ECHO MEAS - MV A MAX VEL: 114 CM/SEC
BH CV ECHO MEAS - MV DEC SLOPE: 357 CM/SEC^2
BH CV ECHO MEAS - MV E MAX VEL: 81.4 CM/SEC
BH CV ECHO MEAS - MV E/A: 0.71
BH CV ECHO MEAS - MV P1/2T MAX VEL: 97.7 CM/SEC
BH CV ECHO MEAS - MV P1/2T: 80.2 MSEC
BH CV ECHO MEAS - MVA P1/2T LCG: 2.3 CM^2
BH CV ECHO MEAS - MVA(P1/2T): 2.7 CM^2
BH CV ECHO MEAS - PA MAX PG (FULL): 0.14 MMHG
BH CV ECHO MEAS - PA MAX PG: 2.2 MMHG
BH CV ECHO MEAS - PA V2 MAX: 74.8 CM/SEC
BH CV ECHO MEAS - RAP SYSTOLE: 5 MMHG
BH CV ECHO MEAS - RV MAX PG: 2.1 MMHG
BH CV ECHO MEAS - RV MEAN PG: 1 MMHG
BH CV ECHO MEAS - RV V1 MAX: 72.5 CM/SEC
BH CV ECHO MEAS - RV V1 MEAN: 45.8 CM/SEC
BH CV ECHO MEAS - RV V1 VTI: 15.7 CM
BH CV ECHO MEAS - RVDD: 3.4 CM
BH CV ECHO MEAS - RVSP: 32.9 MMHG
BH CV ECHO MEAS - SI(CUBED): 20.2 ML/M^2
BH CV ECHO MEAS - SI(LVOT): 44.7 ML/M^2
BH CV ECHO MEAS - SI(MOD-SP2): 6.8 ML/M^2
BH CV ECHO MEAS - SI(MOD-SP4): 19.1 ML/M^2
BH CV ECHO MEAS - SI(TEICH): 23.5 ML/M^2
BH CV ECHO MEAS - SV(CUBED): 31.6 ML
BH CV ECHO MEAS - SV(LVOT): 69.7 ML
BH CV ECHO MEAS - SV(MOD-SP2): 10.7 ML
BH CV ECHO MEAS - SV(MOD-SP4): 29.8 ML
BH CV ECHO MEAS - SV(TEICH): 36.8 ML
BH CV ECHO MEAS - TR MAX VEL: 264 CM/SEC
BILIRUB SERPL-MCNC: 0.2 MG/DL (ref 0–1.2)
BUN SERPL-MCNC: 14 MG/DL (ref 8–23)
BUN/CREAT SERPL: 22.6 (ref 7–25)
CALCIUM SPEC-SCNC: 9 MG/DL (ref 8.6–10.5)
CHLORIDE SERPL-SCNC: 98 MMOL/L (ref 98–107)
CO2 SERPL-SCNC: 25 MMOL/L (ref 22–29)
CREAT SERPL-MCNC: 0.62 MG/DL (ref 0.57–1)
DEPRECATED RDW RBC AUTO: 39.6 FL (ref 37–54)
EOSINOPHIL # BLD AUTO: 0.26 10*3/MM3 (ref 0–0.4)
EOSINOPHIL NFR BLD AUTO: 3.1 % (ref 0.3–6.2)
ERYTHROCYTE [DISTWIDTH] IN BLOOD BY AUTOMATED COUNT: 12.3 % (ref 12.3–15.4)
GFR SERPL CREATININE-BSD FRML MDRD: 91 ML/MIN/1.73
GLOBULIN UR ELPH-MCNC: 2 GM/DL
GLUCOSE SERPL-MCNC: 120 MG/DL (ref 65–99)
HCT VFR BLD AUTO: 35.9 % (ref 34–46.6)
HGB BLD-MCNC: 12.5 G/DL (ref 12–15.9)
IMM GRANULOCYTES # BLD AUTO: 0.03 10*3/MM3 (ref 0–0.05)
IMM GRANULOCYTES NFR BLD AUTO: 0.4 % (ref 0–0.5)
LYMPHOCYTES # BLD AUTO: 1.39 10*3/MM3 (ref 0.7–3.1)
LYMPHOCYTES NFR BLD AUTO: 16.7 % (ref 19.6–45.3)
MCH RBC QN AUTO: 30.5 PG (ref 26.6–33)
MCHC RBC AUTO-ENTMCNC: 34.8 G/DL (ref 31.5–35.7)
MCV RBC AUTO: 87.6 FL (ref 79–97)
MONOCYTES # BLD AUTO: 0.91 10*3/MM3 (ref 0.1–0.9)
MONOCYTES NFR BLD AUTO: 10.9 % (ref 5–12)
NEUTROPHILS NFR BLD AUTO: 5.68 10*3/MM3 (ref 1.7–7)
NEUTROPHILS NFR BLD AUTO: 68.3 % (ref 42.7–76)
NRBC BLD AUTO-RTO: 0 /100 WBC (ref 0–0.2)
PLATELET # BLD AUTO: 288 10*3/MM3 (ref 140–450)
PMV BLD AUTO: 9.4 FL (ref 6–12)
POTASSIUM SERPL-SCNC: 3.7 MMOL/L (ref 3.5–5.2)
PROT SERPL-MCNC: 6 G/DL (ref 6–8.5)
QT INTERVAL: 422 MS
QTC INTERVAL: 414 MS
RBC # BLD AUTO: 4.1 10*6/MM3 (ref 3.77–5.28)
SODIUM SERPL-SCNC: 132 MMOL/L (ref 136–145)
WBC NRBC COR # BLD: 8.32 10*3/MM3 (ref 3.4–10.8)

## 2022-02-16 PROCEDURE — 76775 US EXAM ABDO BACK WALL LIM: CPT

## 2022-02-16 PROCEDURE — 96372 THER/PROPH/DIAG INJ SC/IM: CPT

## 2022-02-16 PROCEDURE — 80053 COMPREHEN METABOLIC PANEL: CPT | Performed by: FAMILY MEDICINE

## 2022-02-16 PROCEDURE — 85025 COMPLETE CBC W/AUTO DIFF WBC: CPT | Performed by: FAMILY MEDICINE

## 2022-02-16 PROCEDURE — 93975 VASCULAR STUDY: CPT

## 2022-02-16 PROCEDURE — 93306 TTE W/DOPPLER COMPLETE: CPT | Performed by: INTERNAL MEDICINE

## 2022-02-16 PROCEDURE — G0378 HOSPITAL OBSERVATION PER HR: HCPCS

## 2022-02-16 PROCEDURE — 25010000002 ENOXAPARIN PER 10 MG: Performed by: FAMILY MEDICINE

## 2022-02-16 PROCEDURE — 96366 THER/PROPH/DIAG IV INF ADDON: CPT

## 2022-02-16 PROCEDURE — 93306 TTE W/DOPPLER COMPLETE: CPT

## 2022-02-16 RX ADMIN — METOPROLOL TARTRATE 25 MG: 25 TABLET, FILM COATED ORAL at 11:09

## 2022-02-16 RX ADMIN — SODIUM BICARBONATE TAB 325 MG 325 MG: 325 TAB at 03:31

## 2022-02-16 RX ADMIN — VALSARTAN 160 MG: 160 TABLET, FILM COATED ORAL at 11:09

## 2022-02-16 RX ADMIN — ENOXAPARIN SODIUM 30 MG: 30 INJECTION SUBCUTANEOUS at 03:32

## 2022-02-16 RX ADMIN — Medication 10 ML: at 00:07

## 2022-02-16 RX ADMIN — CYANOCOBALAMIN TAB 1000 MCG 1000 MCG: 1000 TAB at 11:09

## 2022-02-16 RX ADMIN — HYDRALAZINE HYDROCHLORIDE 50 MG: 50 TABLET, FILM COATED ORAL at 11:09

## 2022-02-16 RX ADMIN — CLOPIDOGREL BISULFATE 75 MG: 75 TABLET, FILM COATED ORAL at 11:09

## 2022-02-16 RX ADMIN — ASPIRIN 81 MG: 81 TABLET, FILM COATED ORAL at 11:09

## 2022-02-16 RX ADMIN — METOPROLOL TARTRATE 25 MG: 25 TABLET, FILM COATED ORAL at 03:32

## 2022-02-16 RX ADMIN — ISOSORBIDE MONONITRATE 30 MG: 30 TABLET, EXTENDED RELEASE ORAL at 11:09

## 2022-02-16 RX ADMIN — VALSARTAN 160 MG: 160 TABLET, FILM COATED ORAL at 03:32

## 2022-02-16 NOTE — PLAN OF CARE
Goal Outcome Evaluation:      Pt being discharged this shift. IV and lines removed per order.

## 2022-02-16 NOTE — DISCHARGE SUMMARY
[facility] Medicine Services  DISCHARGE SUMMARY       Date of Admission: 2/15/2022  Date of Discharge:  2/16/2022  Primary Care Physician: Ariana Adkins DO    Presenting Problem/History of Present Illness:  Asymptomatic hypertensive urgency [I16.0]     Final Discharge Diagnoses:  Active Hospital Problems    Diagnosis    • Asymptomatic hypertensive urgency        Consults:   Consults     No orders found for last 30 day(s).          Procedures Performed:                 Pertinent Test Results:   Lab Results (most recent)     Procedure Component Value Units Date/Time    Comprehensive Metabolic Panel [[id number]]  (Abnormal) Collected: 02/16/22 0548    Specimen: Blood Updated: 02/16/22 0631     Glucose 120 mg/dL      BUN 14 mg/dL      Creatinine 0.62 mg/dL      Sodium 132 mmol/L      Potassium 3.7 mmol/L      Chloride 98 mmol/L      CO2 25.0 mmol/L      Calcium 9.0 mg/dL      Total Protein 6.0 g/dL      Albumin 4.00 g/dL      ALT (SGPT) 5 U/L      AST (SGOT) 11 U/L      Alkaline Phosphatase 57 U/L      Total Bilirubin 0.2 mg/dL      eGFR Non African Amer 91 mL/min/1.73      Globulin 2.0 gm/dL      A/G Ratio 2.0 g/dL      BUN/Creatinine Ratio 22.6     Anion Gap 9.0 mmol/L     Narrative:      GFR Normal >60  Chronic Kidney Disease <60  Kidney Failure <15      CBC Auto Differential [[id number]]  (Abnormal) Collected: 02/16/22 0548    Specimen: Blood Updated: 02/16/22 0610     WBC 8.32 10*3/mm3      RBC 4.10 10*6/mm3      Hemoglobin 12.5 g/dL      Hematocrit 35.9 %      MCV 87.6 fL      MCH 30.5 pg      MCHC 34.8 g/dL      RDW 12.3 %      RDW-SD 39.6 fl      MPV 9.4 fL      Platelets 288 10*3/mm3      Neutrophil % 68.3 %      Lymphocyte % 16.7 %      Monocyte % 10.9 %      Eosinophil % 3.1 %      Basophil % 0.6 %      Immature Grans % 0.4 %      Neutrophils, Absolute 5.68 10*3/mm3      Lymphocytes, Absolute 1.39 10*3/mm3      Monocytes, Absolute 0.91 10*3/mm3      Eosinophils,  Absolute 0.26 10*3/mm3      Basophils, Absolute 0.05 10*3/mm3      Immature Grans, Absolute 0.03 10*3/mm3      nRBC 0.0 /100 WBC     Troponin [[id number]]  (Normal) Collected: 02/15/22 1833    Specimen: Blood Updated: 02/15/22 1916     Troponin T <0.010 ng/mL     Narrative:      Troponin T Reference Range:  <= 0.03 ng/mL-   Negative for AMI  >0.03 ng/mL-     Abnormal for myocardial necrosis.  Clinicians would have to utilize clinical acumen, EKG, Troponin and serial changes to determine if it is an Acute Myocardial Infarction or myocardial injury due to an underlying chronic condition.       Results may be falsely decreased if patient taking Biotin.      TSH [[id number]]  (Normal) Collected: 02/15/22 1536    Specimen: Blood Updated: 02/15/22 1833     TSH 3.280 uIU/mL     COVID-19 and FLU A/B PCR - Swab, Nasopharynx [[id number]]  (Normal) Collected: 02/15/22 1755    Specimen: Swab from Nasopharynx Updated: 02/15/22 1829     COVID19 Not Detected     Influenza A PCR Not Detected     Influenza B PCR Not Detected    Narrative:      Fact sheet for providers: https://www.fda.gov/media/680240/download    Fact sheet for patients: https://www.fda.gov/media/777744/download    Test performed by PCR.    [place] Draw [[id number]] Collected: 02/15/22 1536    Specimen: Blood Updated: 02/15/22 1645    Narrative:      The following orders were created for panel order [place] Draw.  Procedure                               Abnormality         Status                     ---------                               -----------         ------                     Green Top (Gel)[[id number]]                                  Final result               Lavender Top[[id number]]                                     Final result               Gold Top - SST[[id number]]                                   Final result               Light Blue Top[[id number]]                                                                Please view results for these tests on the  individual orders.    Green Top (Gel) [[id number]] Collected: 02/15/22 1536    Specimen: Blood Updated: 02/15/22 1645     Extra Tube Hold for add-ons.     Comment: Auto resulted.       Lavender Top [[id number]] Collected: 02/15/22 1536    Specimen: Blood Updated: 02/15/22 1645     Extra Tube hold for add-on     Comment: Auto resulted       Gold Top - SST [[id number]] Collected: 02/15/22 1536    Specimen: Blood Updated: 02/15/22 1645     Extra Tube Hold for add-ons.     Comment: Auto resulted.       Comprehensive Metabolic Panel [[id number]]  (Abnormal) Collected: 02/15/22 1536    Specimen: Blood Updated: 02/15/22 1600     Glucose 107 mg/dL      BUN 13 mg/dL      Creatinine 0.78 mg/dL      Sodium 130 mmol/L      Potassium 4.0 mmol/L      Chloride 95 mmol/L      CO2 25.0 mmol/L      Calcium 9.5 mg/dL      Total Protein 7.0 g/dL      Albumin 4.80 g/dL      ALT (SGPT) 6 U/L      AST (SGOT) 14 U/L      Alkaline Phosphatase 63 U/L      Total Bilirubin 0.3 mg/dL      eGFR Non African Amer 70 mL/min/1.73      Globulin 2.2 gm/dL      A/G Ratio 2.2 g/dL      BUN/Creatinine Ratio 16.7     Anion Gap 10.0 mmol/L     Narrative:      GFR Normal >60  Chronic Kidney Disease <60  Kidney Failure <15      Troponin [[id number]]  (Normal) Collected: 02/15/22 1536    Specimen: Blood Updated: 02/15/22 1559     Troponin T <0.010 ng/mL     Narrative:      Troponin T Reference Range:  <= 0.03 ng/mL-   Negative for AMI  >0.03 ng/mL-     Abnormal for myocardial necrosis.  Clinicians would have to utilize clinical acumen, EKG, Troponin and serial changes to determine if it is an Acute Myocardial Infarction or myocardial injury due to an underlying chronic condition.       Results may be falsely decreased if patient taking Biotin.      BNP [[id number]]  (Normal) Collected: 02/15/22 1536    Specimen: Blood Updated: 02/15/22 1558     proBNP 407.7 pg/mL     Narrative:      Among patients with dyspnea, NT-proBNP is highly sensitive for the detection of  acute congestive heart failure. In addition NT-proBNP of <300 pg/ml effectively rules out acute congestive heart failure with 99% negative predictive value.    Results may be falsely decreased if patient taking Biotin.      CBC & Differential [[id number]]  (Abnormal) Collected: 02/15/22 1536    Specimen: Blood Updated: 02/15/22 1542    Narrative:      The following orders were created for panel order CBC & Differential.  Procedure                               Abnormality         Status                     ---------                               -----------         ------                     CBC Auto Differential[[id number]]        Abnormal            Final result                 Please view results for these tests on the individual orders.    CBC Auto Differential [[id number]]  (Abnormal) Collected: 02/15/22 1536    Specimen: Blood Updated: 02/15/22 1542     WBC 9.28 10*3/mm3      RBC 4.62 10*6/mm3      Hemoglobin 14.4 g/dL      Hematocrit 40.6 %      MCV 87.9 fL      MCH 31.2 pg      MCHC 35.5 g/dL      RDW 12.2 %      RDW-SD 39.3 fl      MPV 9.2 fL      Platelets 325 10*3/mm3      Neutrophil % 64.5 %      Lymphocyte % 20.9 %      Monocyte % 10.7 %      Eosinophil % 2.8 %      Basophil % 0.6 %      Immature Grans % 0.5 %      Neutrophils, Absolute 5.98 10*3/mm3      Lymphocytes, Absolute 1.94 10*3/mm3      Monocytes, Absolute 0.99 10*3/mm3      Eosinophils, Absolute 0.26 10*3/mm3      Basophils, Absolute 0.06 10*3/mm3      Immature Grans, Absolute 0.05 10*3/mm3      nRBC 0.0 /100 WBC         Imaging Results (Most Recent)     Procedure Component Value Units Date/Time    US Renal Artery Complete [[id number]] Collected: 02/16/22 1023     Updated: 02/16/22 1231    Narrative:      EXAMINATION:  ultrasound, renal and renal arterial duplex  analysis    CLINICAL INDICATION / HISTORY:  htn, I16.0 Hypertensive urgency    COMPARISON:  none    TECHNIQUE:  ultrasound, renal    FULL RESULTS / FINDINGS:    Kidney, right:      size:   normal, measuring 8.9 x 4.3 x 4.1 cm    echotexture:  normal    no nephrolithiasis, solid mass, or collecting system dilation    Kidney, left:      size:  normal, measuring 9.3 x 4.6 x 4.3 cm    echotexture:  normal    no nephrolithiasis, solid mass, or collecting system dilation.  Left kidney upper postsurgical anechoic structure with good  through acoustic transmission which measures 1.2 cm in greatest  diameter consistent with benign simple renal cortical cyst.    Urinary bladder:  Bladder is urine filled distended with a volume  of 656 mL. The bladder is otherwise unremarkable.      Vascular:  (visualized portions of)    Aorta: Normal caliber. Peak systolic velocity of 116 cm/s.    Right renal artery: Normal caliber. Peak systolic velocity of  44 cm/s. Resistive indices are 0.82. Right renal artery to aortic  ratio is 0.38.    Left renal artery: Normal caliber. Peak systolic velocity of 41  cm/s. Resistive indices is 0.81. Left renal artery to aortic  ratio is 0.35.    Misc:        Impression:      CONCLUSION:    1.  Left kidney upper pole 1.2 cm benign simple renal cortical  cyst.  2.  Bladder is urine filled distended with volume is 656 mL. This  is of uncertain etiology. Cannot exclude urinary retention.  3.  No evidence of renal artery stenosis.  4.  Bilateral elevated resistive indices suggestive of  parenchymal disease.  5.  Remainder of renal ultrasound and renal arterial duplex  analysis is unremarkable.    Electronically signed by:  Pj Zelaya MD  2/16/2022 12:29 PM CST  Workstation: [id number]    US Renal Bilateral [[id number]] Collected: 02/16/22 1023     Updated: 02/16/22 1231    Narrative:      EXAMINATION:  ultrasound, renal and renal arterial duplex  analysis    CLINICAL INDICATION / HISTORY:  htn, I16.0 Hypertensive urgency    COMPARISON:  none    TECHNIQUE:  ultrasound, renal    FULL RESULTS / FINDINGS:    Kidney, right:      size:  normal, measuring 8.9 x 4.3 x 4.1 cm    echotexture:   normal    no nephrolithiasis, solid mass, or collecting system dilation    Kidney, left:      size:  normal, measuring 9.3 x 4.6 x 4.3 cm    echotexture:  normal    no nephrolithiasis, solid mass, or collecting system dilation.  Left kidney upper postsurgical anechoic structure with good  through acoustic transmission which measures 1.2 cm in greatest  diameter consistent with benign simple renal cortical cyst.    Urinary bladder:  Bladder is urine filled distended with a volume  of 656 mL. The bladder is otherwise unremarkable.      Vascular:  (visualized portions of)    Aorta: Normal caliber. Peak systolic velocity of 116 cm/s.    Right renal artery: Normal caliber. Peak systolic velocity of  44 cm/s. Resistive indices are 0.82. Right renal artery to aortic  ratio is 0.38.    Left renal artery: Normal caliber. Peak systolic velocity of 41  cm/s. Resistive indices is 0.81. Left renal artery to aortic  ratio is 0.35.    Misc:        Impression:      CONCLUSION:    1.  Left kidney upper pole 1.2 cm benign simple renal cortical  cyst.  2.  Bladder is urine filled distended with volume is 656 mL. This  is of uncertain etiology. Cannot exclude urinary retention.  3.  No evidence of renal artery stenosis.  4.  Bilateral elevated resistive indices suggestive of  parenchymal disease.  5.  Remainder of renal ultrasound and renal arterial duplex  analysis is unremarkable.    Electronically signed by:  Pj Zelaya MD  2/16/2022 12:29 PM CST  Workstation: [id number]    CT Head Without Contrast [[id number]] Collected: 02/15/22 1629     Updated: 02/15/22 1654    Narrative:      EXAM: CT HEAD WITHOUT CONTRAST    CLINICAL HISTORY:  86 years Female,tia    COMPARISON:  None    Axial with coronal and sagittal images were obtained without  intravenous contrast.  This examination was performed according  to our departmental dose optimization program which includes  automated exposure control, adjustment of the MA and kV according  to  patient size, and/or use of iterative reconstruction  technique.    FINDINGS:  Low-attenuation in the bilateral periventricular/deep cerebral  white matter, most compatible with relatively extensive chronic  small vessel ischemic change. Chronic lacunar infarcts in the  bilateral basal ganglia. There also are small infarcts in the  right cerebellar hemisphere, chronic in appearance. Mild  generalized cerebral volume loss. No acute intracranial  hemorrhage, mass effect, hydrocephalus, or CT signs of acute  infarct.    The visualized paranasal sinuses and the mastoid air cells are  clear.      Impression:      1. Rather extensive chronic small vessel ischemic change.  2. 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  3. No acute intracranial process.    Electronically signed by:  Otto Wong MD  2/15/2022 4:51 PM CST  Workstation: [id number]    XR Chest 1 View [[id number]] Collected: 02/15/22 1534     Updated: 02/15/22 1618    Narrative:      EXAMINATION:  XR CHEST 1 VW    CLINICAL HISTORY:  86 years Female,Chest pain protocol    COMPARISON:  Chest x-ray dated 2/11/2022    FINDINGS:  Sternotomy. Borderline heart size. Normal pulmonary  vascularity. Evidence of a prior granulomatous process. No focal  consolidation, pleural effusion, or pneumothorax. No significant  change relative to 2/11/2022.      Impression:      Stable exam without acute cardiac pulmonary process.    Electronically signed by:  Otto Wong MD  2/15/2022 4:16 PM CST  Workstation: [id number]          Chief Complaint on Day of Discharge: No complaints    Hospital Course:  The patient is a 86 y.o. female with past medical history notable for CAD, hypertension, and hyperlipidemia who presented to [facility] with asymptomatic hypertensive urgency.  Patient was admitted and started on Cardene infusion.  This was able to be weaned off of Cardene.  Her home medications were resumed and her BP remained  "improved.  She underwent Echo that showed normal EF.  Renal ultrasound was obtained as well that did not demonstrate any arterial stenosis.  Patient otherwise felt at baseline and stable for d/c.  She will follow up with PCP as outpatient.  Patient and son voiced understanding and agreement.       Condition on Discharge:  Stable    Physical Exam on Discharge:  /77 (BP Location: Right arm, Patient Position: Lying)   Pulse 62   Temp 97.5 °F (36.4 °C) (Oral)   Resp 22   Ht 160 cm (62.99\")   Wt 55 kg (121 lb 4.1 oz)   SpO2 99%   BMI 21.48 kg/m²   Physical Exam    Discharge Disposition:  Home or Self Care    Discharge Medications:     Discharge Medications      Continue These Medications      Instructions Start Date   aspirin 81 MG tablet   81 mg, Oral, Daily      clopidogrel 75 MG tablet  Commonly known as: PLAVIX   75 mg, Oral, Daily      hydrALAZINE 25 MG tablet  Commonly known as: APRESOLINE   50 mg, Oral, 3 Times Daily      isosorbide mononitrate 30 MG 24 hr tablet  Commonly known as: IMDUR   30 mg, Oral, Daily      Loratadine 10 MG capsule   10 mg, Oral, Daily PRN, Allergy relief       METOPROLOL TARTRATE PO   25 mg, Oral, 2 Times Daily      Omega-3 Fish Oil 1000 MG capsule   1,000 mg, Oral, Daily      sodium bicarbonate 650 MG tablet   325 mg, Oral, 4 Times Weekly, [id number]       valsartan 160 MG tablet  Commonly known as: DIOVAN   160 mg, Oral, 2 Times Daily      vitamin B-12 1000 MCG tablet  Commonly known as: CYANOCOBALAMIN   1,000 mcg, Sublingual, Daily             Discharge Diet:   Diet Instructions     Diet: Regular      Discharge Diet: Regular          Activity at Discharge:   Activity Instructions     Activity as Tolerated            Discharge Care Plan/Instructions: Take medications as prescribed, follow up with PCP, return for worsening symptoms.     Follow-up Appointments:   Future Appointments   Date Time Provider Department Center   8/16/2022  1:30 PM [facility] ROOM [facility] " [place]   8/16/2022  2:00 PM Jun Mast, APRN [facility] None       Test Results Pending at Discharge: None      Martinez Burrell MD    Time: 29 minutes

## 2022-02-16 NOTE — PLAN OF CARE
Goal Outcome Evaluation:  Plan of Care Reviewed With: patient        Progress: improving  Outcome Summary: nicardipine off at this time with SBP <150. no c/o overnight. voiding well via bedpan. VSS. will cont to monitor

## 2022-02-16 NOTE — ED NOTES
Pt given a pimento cheese sandwich and soda.  Son left to go home for the night     Kristi Anderson RN  02/15/22 2011

## 2022-08-16 ENCOUNTER — OFFICE VISIT (OUTPATIENT)
Dept: CARDIAC SURGERY | Facility: CLINIC | Age: 87
End: 2022-08-16

## 2022-08-16 VITALS
HEIGHT: 63 IN | DIASTOLIC BLOOD PRESSURE: 79 MMHG | SYSTOLIC BLOOD PRESSURE: 136 MMHG | OXYGEN SATURATION: 99 % | HEART RATE: 60 BPM | WEIGHT: 121 LBS | BODY MASS INDEX: 21.44 KG/M2

## 2022-08-16 DIAGNOSIS — I65.23 BILATERAL CAROTID ARTERY STENOSIS: Primary | ICD-10-CM

## 2022-08-16 DIAGNOSIS — F17.200 SMOKER: ICD-10-CM

## 2022-08-16 DIAGNOSIS — E78.2 MIXED HYPERLIPIDEMIA: ICD-10-CM

## 2022-08-16 DIAGNOSIS — I73.9 PVD (PERIPHERAL VASCULAR DISEASE): ICD-10-CM

## 2022-08-16 PROCEDURE — 99214 OFFICE O/P EST MOD 30 MIN: CPT | Performed by: NURSE PRACTITIONER

## 2022-08-16 NOTE — PATIENT INSTRUCTIONS
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     Follow up in 6 months IDAL[facility] and carotid     If you should experience any neurological symptoms including but not limited to visual or speech disturbances confusion, seizures, or weakness of limbs of one side of your body notify Heart and Vascular center immediately for evaluation or if after hours present to the nearest Emergency Department.

## 2022-08-17 NOTE — PROGRESS NOTES
Minerva Madrid  1935    Chief Complaint   Patient presents with   • Carotid Artery Disease     HPI:      PCP:  Ariana Adkins,      86y.o. female with HTN(stable, increased risk stroke, rupture), Hyperlipidemia(stable, increased risk cardiovascular events), Diabetes Mellitus(stable, increased risk cardiovascular events), Smoker(uncontrolled, increased risk cardiovascular events) and COPD(stable, increased risk pulmonary complications) , PVD(stable, increase risk cardiovascular events), Carotid Stenosis(chronic severe progression, increase risk stroke)  smokes 1/2 PPD.  Moderate leg weakness, balance problems, leg cramps at night.  Limited claudication symptoms.  Uses walker, limited ambulation.  Prior stroke, hip replacement.  Progressed well post TCAR. Returns for follow up.  No recent trouble, BP improving.  Had episode of syncope approx 6 months ago, evaluated by her cardiologist no issues since. No other associated signs, symptoms or modifying factors.     2/2017 IDALIA:  RIGHT .79 triphasic.  LEFT .76 triphasic.  3/2018 IDALIA:  RIGHT .88 triphasic.  LEFT .85 triphasic.  3/2019 IDALIA:  RIGHT .55 biphasic.  LEFT .62 biphasic.  6/2019 IDALIA:  RIGHT .49 tri/biphasic.  LEFT .75 tri/biphasic.  7/2019 CTA runoff:  Focal dissection distal thoracic aorta, distal aorta 10mm, circumferential calcification.  RIGHT SFA 80%, pop/tibial occlusion.  LEFT EIA 60% bifucation, 80% mid.  SFA moderate diffuse disease, 2v runoff.  10/2019 IDALIA:  RIGHT .43 tri/biphasic.  LEFT .51 biphasic.  6/2020 IDALIA:  RIGHT .66 tri/biphasic.  LEFT .63 tri/biphasic.  8/2021 IDALIA:  RIGHT .63 biphasic.  LEFT .62 biphasic.     2009 LEFT CEA  2016 Stroke  2/2017 Carotid Duplex:  DENVER 0-49% (120/18cm/s, ratio 0.9), LICA >70% (230/36cm/s, ratio 1.8) antegrade verts.  3/2018 Carotid Duplex:  DENVER 0-49% (73/19cm/s, ratio 0.7), LICA 50-69% (186/39cm/s, ratio 1.8) antegrade verts.  3/2019 Carotid Duplex:  DENVER 0-49% (111/22cm/s, ratio 0.8), LICA 50-69%  (183/41cm/s, ratio 1.9) antegrade verts.  8/2021 Carotid Duplex:  DENVER 0-49% (123/14cm/s, ratio 1.0), LICA 50-69% (293/63cm/s, ratio 3.4) antegrade verts.  8/2021 CTA Carotids:  DENVER no significant obstruction.  LICA 90%  10/13/21: LEFT TCAR   11/2021 Carotid Duplex:  LICA 0-49% mPSV 93c/s Ratio 1.3, Antegrade vertebrals  2/2022 Carotid Duplex: DENVER 0-49% mPSV 121c/s Ratio 1.8, LICA 0-49% mPSV 122c/s Ratio 1.4, Antegrade vertebrals  8/2022 Carotid Duplex: DENVER 0-49% mPSV 142c/s Ratio 1.5, LICA 0-49% mPSV 133c/s Ratio 1.2, Antegrade vertebrals    10/2018 ECG:  , QTc 440  10/2018 Echocardiogram:  EF 55%, LA 34mm, LV 33mm, RVSP 33mmHg.   Mild MR TR.    The following portions of the patient's history were reviewed and updated as appropriate: allergies, current medications, past family history, past medical history, past social history, past surgical history and problem list.  Recent images independently reviewed.  Available laboratory values reviewed.    PMH:  Past Medical History:   Diagnosis Date   • Appetite loss    • Arthritis    • Coronary artery disease    • Coughing    • Essential hypertension    • Hyperlipidemia    • Leg pain    • Muscle weakness    • Postmenopausal bleeding     With thickened endometrial stripe    • Stroke (HCC) 02/2016     Past Surgical History:   Procedure Laterality Date   • CAROTID ENDARTERECTOMY Right    • CORONARY ARTERY BYPASS GRAFT     • JOINT REPLACEMENT Left 01/2017    partial hip replacement   • MASTOID SURGERY       Family History   Problem Relation Age of Onset   • No Known Problems Mother    • No Known Problems Father      Social History     Tobacco Use   • Smoking status: Current Every Day Smoker     Packs/day: 1.00     Years: 56.00     Pack years: 56.00     Types: Cigarettes   • Smokeless tobacco: Never Used   Vaping Use   • Vaping Use: Never used   Substance Use Topics   • Alcohol use: No   • Drug use: No       ALLERGIES:  Allergies   Allergen Reactions   • Amoxicillin  Other (See Comments)     Pt unsure   • Erythromycin Base Other (See Comments)     Pt unsure   • Iodine Other (See Comments)     Pt unsure   • Shellfish-Derived Products Other (See Comments)     Pt unsure   • Simvastatin Myalgia         MEDICATIONS:    Current Outpatient Medications:   •  aspirin 81 MG tablet, Take 81 mg by mouth Daily., Disp: , Rfl:   •  clopidogrel (PLAVIX) 75 MG tablet, Take 75 mg by mouth Daily., Disp: , Rfl:   •  hydrALAZINE (APRESOLINE) 25 MG tablet, Take 50 mg by mouth 3 (Three) Times a Day., Disp: , Rfl:   •  isosorbide mononitrate (IMDUR) 30 MG 24 hr tablet, Take 30 mg by mouth Daily., Disp: , Rfl:   •  Loratadine 10 MG capsule, Take 10 mg by mouth Daily As Needed. Allergy relief, Disp: , Rfl:   •  METOPROLOL TARTRATE PO, Take 25 mg by mouth 2 (Two) Times a Day., Disp: , Rfl:   •  Omega-3 Fatty Acids (OMEGA-3 FISH OIL) 1000 MG capsule, Take 1,000 mg by mouth Daily., Disp: , Rfl:   •  sodium bicarbonate 650 MG tablet, Take 325 mg by mouth 4 (Four) Times a Week. [id number], Disp: , Rfl:   •  valsartan (DIOVAN) 160 MG tablet, Take 160 mg by mouth 2 (Two) Times a Day., Disp: , Rfl:   •  vitamin B-12 (CYANOCOBALAMIN) 1000 MCG tablet, Place 1,000 mcg under the tongue Daily., Disp: , Rfl:     Review of Systems   Review of Systems   Constitutional: Positive for malaise/fatigue. Negative for weight loss.   HENT: Positive for hearing loss.    Cardiovascular: Positive for dyspnea on exertion. Negative for chest pain and claudication.   Respiratory: Negative for cough and shortness of breath.    Hematologic/Lymphatic: Bruises/bleeds easily.   Skin: Negative for color change and poor wound healing.   Musculoskeletal: Positive for back pain, falls, muscle cramps and stiffness.   Neurological: Positive for dizziness and loss of balance. Negative for numbness, paresthesias and weakness.       Physical Exam   Vitals:    08/16/22 1348   BP: 136/79   BP Location: Left arm   Patient Position: Sitting   Cuff  "Size: Adult   Pulse: 60   SpO2: 99%   Weight: 54.9 kg (121 lb)   Height: 160 cm (63\")         Body surface area is 1.56 meters squared.  Body mass index is 21.43 kg/m².  Physical Exam  Constitutional:       General: She is not in acute distress.     Appearance: She is not ill-appearing.   HENT:      Right Ear: Hearing normal.      Left Ear: Hearing normal.      Nose: No nasal deformity.      Mouth/Throat:      Dentition: Normal dentition. Does not have dentures.   Neck:      Comments: (L) clavicle Inc: CDI  Cardiovascular:      Rate and Rhythm: Normal rate and regular rhythm.      Pulses:           Carotid pulses are 2+ on the right side and 2+ on the left side.       Radial pulses are 2+ on the right side and 2+ on the left side.        Dorsalis pedis pulses are 1+ on the right side.        Posterior tibial pulses are 1+ on the right side and 1+ on the left side.      Heart sounds: No murmur heard.  Pulmonary:      Effort: Pulmonary effort is normal.      Breath sounds: Normal breath sounds.   Abdominal:      General: There is no distension.      Palpations: Abdomen is soft. There is no mass.      Tenderness: There is no abdominal tenderness.   Musculoskeletal:         General: No deformity.      Comments: Gait normal.    Skin:     General: Skin is warm and dry.      Coloration: Skin is not pale.      Findings: No erythema.      Comments: No venous staining   Neurological:      General: No focal deficit present.      Mental Status: She is alert and oriented to person, place, and time.   Psychiatric:         Speech: Speech normal.         Behavior: Behavior is cooperative.         Thought Content: Thought content normal.         Judgment: Judgment normal.         BUN   Date Value Ref Range Status   02/16/2022 14 8 - 23 mg/dL Final     Creatinine   Date Value Ref Range Status   02/16/2022 0.62 0.57 - 1.00 mg/dL Final     eGFR Non  Amer   Date Value Ref Range Status   02/16/2022 91 >60 mL/min/1.73 Final "       ASSESSMENT:PLAN  8/2022 Carotid Duplex: DENVER 0-49% mPSV 142c/s Ratio 1.5, LICA 0-49% mPSV 133c/s Ratio 1.2, Antegrade vertebrals    1. Bilateral carotid artery stenosis  L ICA stent patent, significant improvement in velocities post procedure.      No new deficits did have absence episode for which she was seen in ED for last week.     Return 6 months repeat bilateral duplex    ASA, plavix, Repatha    2. Mixed hyperlipidemia  Lipid-lowering therapy has been proven beneficial in patients with cardio-vascular disease. Current guidelines recommend statin treatment for all patients with PAD,CAD and carotid stenosis. Statins are beneficial in preventing cardiovascular events, increasing functional capacity and lower the risk of adverse limb loss in PAD. Statins decrease the progression of plaque formation and may improve peripheral vessel lining, and aid in reversing atherosclerosis.    On repatha    3. Nicotine dependence, cigarettes, with other nicotine-induced disorders  Smoking cessation assistance options offered including behavioral counseling (Smoking Cessation Classes), Nicotine replacement therapy (patches or gum), pharmacologic therapy (Chantix, Wellbutrin). Understands tobacco increases risk of expanding AAA, MI, CVA, PAD, carcinoma. Discussion and question answer period 5-7 minutes.    No desire to quit    4. PVD  IDALIA next visit for surveillance of known aorta-iliac idsease  DAPT, Statin no ischemic changes.               This document has been electronically signed by REMI Alonzo on August 17, 2022 16:05 CDT

## 2023-02-28 ENCOUNTER — OFFICE VISIT (OUTPATIENT)
Dept: CARDIAC SURGERY | Facility: CLINIC | Age: 88
End: 2023-02-28
Payer: MEDICARE

## 2023-02-28 VITALS
OXYGEN SATURATION: 98 % | WEIGHT: 120 LBS | SYSTOLIC BLOOD PRESSURE: 180 MMHG | HEART RATE: 62 BPM | DIASTOLIC BLOOD PRESSURE: 88 MMHG | BODY MASS INDEX: 21.26 KG/M2 | HEIGHT: 63 IN

## 2023-02-28 DIAGNOSIS — E78.2 MIXED HYPERLIPIDEMIA: ICD-10-CM

## 2023-02-28 DIAGNOSIS — F17.200 SMOKER: ICD-10-CM

## 2023-02-28 DIAGNOSIS — I73.9 PVD (PERIPHERAL VASCULAR DISEASE): Primary | Chronic | ICD-10-CM

## 2023-02-28 DIAGNOSIS — I65.23 BILATERAL CAROTID ARTERY STENOSIS: ICD-10-CM

## 2023-02-28 PROCEDURE — 99214 OFFICE O/P EST MOD 30 MIN: CPT | Performed by: NURSE PRACTITIONER

## 2023-03-01 NOTE — PROGRESS NOTES
Minerva Madrid  1935    Chief Complaint   Patient presents with   • Carotid Artery Disease     HPI:      PCP:  Ariana Adkins,      86y.o. female with HTN(stable, increased risk stroke, rupture), Hyperlipidemia(stable, increased risk cardiovascular events), Diabetes Mellitus(stable, increased risk cardiovascular events), Smoker(uncontrolled, increased risk cardiovascular events) and COPD(stable, increased risk pulmonary complications) , PVD(stable, increase risk cardiovascular events), Carotid Stenosis(chronic severe progression, increase risk stroke)  smokes 1/2 PPD.  Moderate leg weakness, balance problems, leg cramps at night.  Limited claudication symptoms.  Uses walker, limited ambulation.  Prior stroke, hip replacement.  Progressed well post TCAR. Returns for follow up. Uncontrolled htn.  No new issues since last visit.  Returns today in follow up.  IN wheel chair.   No other associated signs, symptoms or modifying factors.     2/2017 IDALIA:  RIGHT .79 triphasic.  LEFT .76 triphasic.  3/2018 IDALIA:  RIGHT .88 triphasic.  LEFT .85 triphasic.  3/2019 IDALIA:  RIGHT .55 biphasic.  LEFT .62 biphasic.  6/2019 IDALIA:  RIGHT .49 tri/biphasic.  LEFT .75 tri/biphasic.  7/2019 CTA runoff:  Focal dissection distal thoracic aorta, distal aorta 10mm, circumferential calcification.  RIGHT SFA 80%, pop/tibial occlusion.  LEFT EIA 60% bifucation, 80% mid.  SFA moderate diffuse disease, 2v runoff.  10/2019 IDALIA:  RIGHT .43 tri/biphasic.  LEFT .51 biphasic.  6/2020 IDALIA:  RIGHT .66 tri/biphasic.  LEFT .63 tri/biphasic.  8/2021 IDALIA:  RIGHT .63 biphasic.  LEFT .62 biphasic.  3/2023 IDALIA: Right 0.64 biphasic.  Left 0.63 triphasic fem, biphasic distally.      2009 LEFT CEA  2016 Stroke  2/2017 Carotid Duplex:  DENVER 0-49% (120/18cm/s, ratio 0.9), LICA >70% (230/36cm/s, ratio 1.8) antegrade verts.  3/2018 Carotid Duplex:  DENVER 0-49% (73/19cm/s, ratio 0.7), LICA 50-69% (186/39cm/s, ratio 1.8) antegrade verts.  3/2019 Carotid Duplex:   DENVER 0-49% (111/22cm/s, ratio 0.8), LICA 50-69% (183/41cm/s, ratio 1.9) antegrade verts.  8/2021 Carotid Duplex:  DENVER 0-49% (123/14cm/s, ratio 1.0), LICA 50-69% (293/63cm/s, ratio 3.4) antegrade verts.  8/2021 CTA Carotids:  DENVER no significant obstruction.  LICA 90%  10/13/21: LEFT TCAR   11/2021 Carotid Duplex:  LICA 0-49% mPSV 93c/s Ratio 1.3, Antegrade vertebrals  2/2022 Carotid Duplex: DENVER 0-49% mPSV 121c/s Ratio 1.8, LICA 0-49% mPSV 122c/s Ratio 1.4, Antegrade vertebrals  8/2022 Carotid Duplex: DENVER 0-49% mPSV 142c/s Ratio 1.5, LICA 0-49% mPSV 133c/s Ratio 1.2, Antegrade vertebrals  3/2023 Carotid Duplex: DENVER 0-49% mPSV 130c/s mEDV 24c/s Ratio 1.1, LICA 50-69% mPSV 134c/s mEDV 32c/s Ratio 2.0, Antegrade vertebrals      10/2018 ECG:  , QTc 440  10/2018 Echocardiogram:  EF 55%, LA 34mm, LV 33mm, RVSP 33mmHg.   Mild MR TR.    The following portions of the patient's history were reviewed and updated as appropriate: allergies, current medications, past family history, past medical history, past social history, past surgical history and problem list.  Recent images independently reviewed.  Available laboratory values reviewed.    PMH:  Past Medical History:   Diagnosis Date   • Appetite loss    • Arthritis    • Coronary artery disease    • Coughing    • Essential hypertension    • Hyperlipidemia    • Leg pain    • Muscle weakness    • Postmenopausal bleeding     With thickened endometrial stripe    • Stroke (HCC) 02/2016     Past Surgical History:   Procedure Laterality Date   • CAROTID ENDARTERECTOMY Right    • CORONARY ARTERY BYPASS GRAFT     • JOINT REPLACEMENT Left 01/2017    partial hip replacement   • MASTOID SURGERY       Family History   Problem Relation Age of Onset   • No Known Problems Mother    • No Known Problems Father      Social History     Tobacco Use   • Smoking status: Every Day     Packs/day: 1.00     Years: 56.00     Pack years: 56.00     Types: Cigarettes     Passive exposure: Current    • Smokeless tobacco: Never   Vaping Use   • Vaping Use: Never used   Substance Use Topics   • Alcohol use: No   • Drug use: No       ALLERGIES:  Allergies   Allergen Reactions   • Amoxicillin Other (See Comments)     Pt unsure   • Erythromycin Base Other (See Comments)     Pt unsure   • Iodine Other (See Comments)     Pt unsure   • Shellfish-Derived Products Other (See Comments)     Pt unsure   • Simvastatin Myalgia         MEDICATIONS:    Current Outpatient Medications:   •  aspirin 81 MG tablet, Take 1 tablet by mouth Daily., Disp: , Rfl:   •  clopidogrel (PLAVIX) 75 MG tablet, Take 1 tablet by mouth Daily., Disp: , Rfl:   •  hydrALAZINE (APRESOLINE) 25 MG tablet, Take 2 tablets by mouth 3 (Three) Times a Day., Disp: , Rfl:   •  isosorbide mononitrate (IMDUR) 30 MG 24 hr tablet, Take 1 tablet by mouth Daily., Disp: , Rfl:   •  Loratadine 10 MG capsule, Take 1 capsule by mouth Daily As Needed. Allergy relief, Disp: , Rfl:   •  METOPROLOL TARTRATE PO, Take 25 mg by mouth 2 (Two) Times a Day., Disp: , Rfl:   •  Omega-3 Fatty Acids (OMEGA-3 FISH OIL) 1000 MG capsule, Take 1,000 mg by mouth Daily., Disp: , Rfl:   •  sodium bicarbonate 650 MG tablet, Take 325 mg by mouth 4 (Four) Times a Week. [id number], Disp: , Rfl:   •  valsartan (DIOVAN) 160 MG tablet, Take 1 tablet by mouth 2 (Two) Times a Day., Disp: , Rfl:   •  vitamin B-12 (CYANOCOBALAMIN) 1000 MCG tablet, Place 1 tablet under the tongue Daily., Disp: , Rfl:     Review of Systems   Review of Systems   Constitutional: Positive for malaise/fatigue. Negative for weight loss.   HENT: Positive for hearing loss.    Cardiovascular: Positive for dyspnea on exertion. Negative for chest pain and claudication.        Denies claudication in wheelchair no rest pain   Respiratory: Negative for cough and shortness of breath.    Hematologic/Lymphatic: Bruises/bleeds easily.   Skin: Negative for color change and poor wound healing.   Musculoskeletal: Positive for back  "pain, falls, muscle cramps and stiffness.   Neurological: Positive for dizziness and loss of balance. Negative for numbness, paresthesias and weakness.       Physical Exam   Vitals:    02/28/23 1415   BP: 180/88   BP Location: Right arm   Patient Position: Sitting   Cuff Size: Adult   Pulse: 62   SpO2: 98%   Weight: 54.4 kg (120 lb)   Height: 160 cm (63\")         Body surface area is 1.56 meters squared.  Body mass index is 21.26 kg/m².  Physical Exam  Constitutional:       General: She is not in acute distress.     Appearance: She is ill-appearing.   HENT:      Right Ear: Hearing normal.      Left Ear: Hearing normal.      Nose: No nasal deformity.      Mouth/Throat:      Dentition: Normal dentition. Does not have dentures.   Neck:      Comments: (L) clavicle Inc: CDI  Cardiovascular:      Rate and Rhythm: Normal rate and regular rhythm.      Pulses:           Carotid pulses are 2+ on the right side and 2+ on the left side.       Radial pulses are 2+ on the right side and 2+ on the left side.        Dorsalis pedis pulses are 1+ on the right side.        Posterior tibial pulses are 1+ on the right side and 1+ on the left side.      Heart sounds: No murmur heard.  Pulmonary:      Effort: Pulmonary effort is normal.      Breath sounds: Normal breath sounds.   Abdominal:      General: There is no distension.      Palpations: Abdomen is soft. There is no mass.      Tenderness: There is no abdominal tenderness.   Musculoskeletal:         General: No deformity.      Comments: Gait normal.    Skin:     General: Skin is warm and dry.      Capillary Refill: Capillary refill takes 2 to 3 seconds.      Coloration: Skin is not pale.      Findings: No erythema.      Comments: No venous staining  No lesions bilateral lower extremities   Neurological:      General: No focal deficit present.      Mental Status: She is alert and oriented to person, place, and time.   Psychiatric:         Speech: Speech normal.         Behavior: " Behavior is cooperative.         Thought Content: Thought content normal.         Judgment: Judgment normal.         BUN   Date Value Ref Range Status   02/16/2022 14 8 - 23 mg/dL Final     Creatinine   Date Value Ref Range Status   02/16/2022 0.62 0.57 - 1.00 mg/dL Final     eGFR Non  Amer   Date Value Ref Range Status   02/16/2022 91 >60 mL/min/1.73 Final       ASSESSMENT:PLAN    1. PVD (peripheral vascular disease) (HCC)  Moderate reduction in right lower extremity perfusion.  Waveforms are consistent with aortoiliac disease.  Would require CTA runoff to evaluate for possible options of revascularization.    Denies lifestyle limiting claudication, ambulatory distance is limited by other factors rather than claudication.    Continue medical management aspirin, Plavix, she is intolerant to statins    2. Bilateral carotid artery stenosis  Stable velocities post left transcarotid artery revascularization  No surgical intervention indicated at this time follow-up with interval imaging repeat carotid duplex in approximately 6 months    She remains on aspirin, Plavix    3. Mixed hyperlipidemia  Lipid-lowering therapy has been proven beneficial in patients with cardio-vascular disease. Current guidelines recommend statin treatment for all patients with PAD,CAD and carotid stenosis. Statins are beneficial in preventing cardiovascular events, increasing functional capacity and lower the risk of adverse limb loss in PAD. Statins decrease the progression of plaque formation and may improve peripheral vessel lining, and aid in reversing atherosclerosis.    Statin intolerant consider PCSK9 with family care provider  Lab Results   Component Value Date    CHLPL 205 (H) 05/19/2022    TRIG 133 05/19/2022    HDL 51 05/19/2022     05/19/2022       4. Smoker  Smoking cessation assistance options offered including behavioral counseling (Smoking Cessation Classes), Nicotine replacement therapy (patches or gum),  pharmacologic therapy (Chantix, Wellbutrin). Understands tobacco increases risk of expanding AAA, MI, CVA, PAD, carcinoma. Discussion and question answer period 5-7 minutes.    No desire to quit                This document has been electronically signed by REMI Alonzo on March 1, 2023 10:18 CST

## 2023-08-01 DIAGNOSIS — I65.23 BILATERAL CAROTID ARTERY STENOSIS: Primary | ICD-10-CM

## 2023-08-01 DIAGNOSIS — I73.9 PVD (PERIPHERAL VASCULAR DISEASE): ICD-10-CM

## 2023-08-29 ENCOUNTER — OFFICE VISIT (OUTPATIENT)
Dept: CARDIAC SURGERY | Facility: CLINIC | Age: 88
End: 2023-08-29
Payer: MEDICARE

## 2023-08-29 VITALS
SYSTOLIC BLOOD PRESSURE: 142 MMHG | BODY MASS INDEX: 21.44 KG/M2 | OXYGEN SATURATION: 98 % | HEART RATE: 68 BPM | DIASTOLIC BLOOD PRESSURE: 76 MMHG | WEIGHT: 121 LBS | HEIGHT: 63 IN

## 2023-08-29 DIAGNOSIS — E78.2 MIXED HYPERLIPIDEMIA: ICD-10-CM

## 2023-08-29 DIAGNOSIS — I65.23 BILATERAL CAROTID ARTERY STENOSIS: ICD-10-CM

## 2023-08-29 DIAGNOSIS — F17.200 SMOKER: ICD-10-CM

## 2023-08-29 DIAGNOSIS — I73.9 PVD (PERIPHERAL VASCULAR DISEASE): Primary | ICD-10-CM

## 2023-08-29 PROCEDURE — 1159F MED LIST DOCD IN RCRD: CPT | Performed by: NURSE PRACTITIONER

## 2023-08-29 PROCEDURE — 99214 OFFICE O/P EST MOD 30 MIN: CPT | Performed by: NURSE PRACTITIONER

## 2023-08-29 PROCEDURE — 1160F RVW MEDS BY RX/DR IN RCRD: CPT | Performed by: NURSE PRACTITIONER

## 2023-08-29 NOTE — PROGRESS NOTES
Minerva Madrid  1935    Chief Complaint   Patient presents with    Peripheral Vascular Disease    Carotid Artery Disease     HPI:      PCP:  Ariana Adkins,      86y.o. female with HTN(stable, increased risk stroke, rupture), Hyperlipidemia(stable, increased risk cardiovascular events), Diabetes Mellitus(stable, increased risk cardiovascular events), Smoker(uncontrolled, increased risk cardiovascular events) and COPD(stable, increased risk pulmonary complications) , PVD(stable, increase risk cardiovascular events), Carotid Stenosis(chronic severe progression, increase risk stroke)  smokes 1/2 PPD.  Moderate leg weakness, balance problems, leg cramps at night. Uses walker, limited ambulation.  Prior stroke, hip replacement.  Progressed well post TCAR. Returns for follow up.  Returns today in follow up.  IN wheel chair.  Family reports occasional dizziness.  Denies lifestyle limiting claudication no other associated signs, symptoms or modifying factors.     2/2017 IDALIA:  RIGHT .79 triphasic.  LEFT .76 triphasic.  3/2018 IDALIA:  RIGHT .88 triphasic.  LEFT .85 triphasic.  3/2019 IDALIA:  RIGHT .55 biphasic.  LEFT .62 biphasic.  6/2019 IDALIA:  RIGHT .49 tri/biphasic.  LEFT .75 tri/biphasic.  7/2019 CTA runoff:  Focal dissection distal thoracic aorta, distal aorta 10mm, circumferential calcification.  RIGHT SFA 80%, pop/tibial occlusion.  LEFT EIA 60% bifucation, 80% mid.  SFA moderate diffuse disease, 2v runoff.  10/2019 IDALIA:  RIGHT .43 tri/biphasic.  LEFT .51 biphasic.  6/2020 IDALIA:  RIGHT .66 tri/biphasic.  LEFT .63 tri/biphasic.  8/2021 IDAILA:  RIGHT .63 biphasic.  LEFT .62 biphasic.  3/2023 IDALIA: Right 0.64 biphasic.  Left 0.63 triphasic fem, biphasic distally.   8/2023 IDALIA: Right 0.65 biphasic.  Left 0.69 triphasic popliteal biphasic distally.     2009 LEFT CEA  2016 Stroke  2/2017 Carotid Duplex:  DENVER 0-49% (120/18cm/s, ratio 0.9), LICA >70% (230/36cm/s, ratio 1.8) antegrade verts.  3/2018 Carotid Duplex:  DENVER  0-49% (73/19cm/s, ratio 0.7), LICA 50-69% (186/39cm/s, ratio 1.8) antegrade verts.  3/2019 Carotid Duplex:  DENVER 0-49% (111/22cm/s, ratio 0.8), LICA 50-69% (183/41cm/s, ratio 1.9) antegrade verts.  8/2021 Carotid Duplex:  DENVER 0-49% (123/14cm/s, ratio 1.0), LICA 50-69% (293/63cm/s, ratio 3.4) antegrade verts.  8/2021 CTA Carotids:  DENVER no significant obstruction.  LICA 90%  10/13/21: LEFT TCAR   11/2021 Carotid Duplex:  LICA 0-49% mPSV 93c/s Ratio 1.3, Antegrade vertebrals  2/2022 Carotid Duplex: DENVER 0-49% mPSV 121c/s Ratio 1.8, LICA 0-49% mPSV 122c/s Ratio 1.4, Antegrade vertebrals  8/2022 Carotid Duplex: DENVER 0-49% mPSV 142c/s Ratio 1.5, LICA 0-49% mPSV 133c/s Ratio 1.2, Antegrade vertebrals  3/2023 Carotid Duplex: DENVER 0-49% mPSV 130c/s mEDV 24c/s Ratio 1.1, LICA 50-69% mPSV 134c/s mEDV 32c/s Ratio 2.0, Antegrade vertebrals  8/2023 Carotid Duplex: DENVER 0-49% mPSV 118c/s mEDV 25c/s Ratio 2.1, LICA 0-49% mPSV 85c/s mEDV 17c/s Ratio 1.2, Antegrade vertebrals      10/2018 ECG:  , QTc 440  10/2018 Echocardiogram:  EF 55%, LA 34mm, LV 33mm, RVSP 33mmHg.   Mild MR TR.    The following portions of the patient's history were reviewed and updated as appropriate: allergies, current medications, past family history, past medical history, past social history, past surgical history and problem list.  Recent images independently reviewed.  Available laboratory values reviewed.    PMH:  Past Medical History:   Diagnosis Date    Appetite loss     Arthritis     Coronary artery disease     Coughing     Essential hypertension     Hyperlipidemia     Leg pain     Muscle weakness     Postmenopausal bleeding     With thickened endometrial stripe     Stroke 02/2016     Past Surgical History:   Procedure Laterality Date    CAROTID ENDARTERECTOMY Right     CORONARY ARTERY BYPASS GRAFT      JOINT REPLACEMENT Left 01/2017    partial hip replacement    MASTOID SURGERY       Family History   Problem Relation Age of Onset    No Known  Problems Mother     No Known Problems Father      Social History     Tobacco Use    Smoking status: Every Day     Packs/day: 1.00     Years: 56.00     Pack years: 56.00     Types: Cigarettes     Passive exposure: Current    Smokeless tobacco: Never   Vaping Use    Vaping Use: Never used   Substance Use Topics    Alcohol use: No    Drug use: No       ALLERGIES:  Allergies   Allergen Reactions    Amoxicillin Other (See Comments)     Pt unsure    Erythromycin Base Other (See Comments)     Pt unsure    Iodine Other (See Comments)     Pt unsure    Shellfish-Derived Products Other (See Comments)     Pt unsure    Simvastatin Myalgia         MEDICATIONS:    Current Outpatient Medications:     aspirin 81 MG tablet, Take 1 tablet by mouth Daily., Disp: , Rfl:     clopidogrel (PLAVIX) 75 MG tablet, Take 1 tablet by mouth Daily., Disp: , Rfl:     hydrALAZINE (APRESOLINE) 25 MG tablet, Take 2 tablets by mouth 3 (Three) Times a Day., Disp: , Rfl:     isosorbide mononitrate (IMDUR) 30 MG 24 hr tablet, Take 1 tablet by mouth Daily., Disp: , Rfl:     Loratadine 10 MG capsule, Take 1 capsule by mouth Daily As Needed. Allergy relief, Disp: , Rfl:     METOPROLOL TARTRATE PO, Take 25 mg by mouth 2 (Two) Times a Day., Disp: , Rfl:     Omega-3 Fatty Acids (OMEGA-3 FISH OIL) 1000 MG capsule, Take 1,000 mg by mouth Daily., Disp: , Rfl:     sodium bicarbonate 650 MG tablet, Take 0.5 tablets by mouth 4 (Four) Times a Week. [id number], Disp: , Rfl:     valsartan (DIOVAN) 160 MG tablet, Take 1 tablet by mouth 2 (Two) Times a Day., Disp: , Rfl:     vitamin B-12 (CYANOCOBALAMIN) 1000 MCG tablet, Place 1 tablet under the tongue Daily., Disp: , Rfl:     Review of Systems   Review of Systems   Constitutional: Positive for malaise/fatigue. Negative for weight loss.   HENT:  Positive for hearing loss.    Eyes:  Positive for blurred vision.   Cardiovascular:  Positive for dyspnea on exertion. Negative for chest pain and claudication.        Denies  "claudication in wheelchair no rest pain   Respiratory:  Negative for cough and shortness of breath.    Hematologic/Lymphatic: Bruises/bleeds easily.   Skin:  Negative for color change and poor wound healing.   Musculoskeletal:  Positive for back pain, falls, muscle cramps and stiffness.   Neurological:  Positive for dizziness and loss of balance. Negative for numbness, paresthesias and weakness.     Physical Exam   Vitals:    08/29/23 1459   BP: 142/76   BP Location: Left arm   Pulse: 68   SpO2: 98%   Weight: 54.9 kg (121 lb)   Height: 160 cm (63\")         Body surface area is 1.56 meters squared.  Body mass index is 21.43 kg/mý.  Physical Exam  Constitutional:       Appearance: She is ill-appearing.      Comments: In wheel chair   HENT:      Right Ear: Hearing normal.      Left Ear: Hearing normal.      Nose: No nasal deformity.      Mouth/Throat:      Dentition: Normal dentition. Does not have dentures.   Neck:      Comments: (L) clavicle Inc: CDI  Cardiovascular:      Rate and Rhythm: Normal rate and regular rhythm.      Pulses:           Carotid pulses are 2+ on the right side and 2+ on the left side.       Radial pulses are 2+ on the right side and 2+ on the left side.        Dorsalis pedis pulses are 1+ on the right side.        Posterior tibial pulses are 1+ on the right side and 1+ on the left side.      Heart sounds: No murmur heard.  Pulmonary:      Effort: Pulmonary effort is normal.      Breath sounds: Normal breath sounds.   Abdominal:      General: There is no distension.      Palpations: Abdomen is soft. There is no mass.      Tenderness: There is no abdominal tenderness.   Musculoskeletal:         General: No deformity.      Comments: Gait normal.    Skin:     General: Skin is warm and dry.      Capillary Refill: Capillary refill takes 2 to 3 seconds.      Coloration: Skin is not pale.      Findings: No erythema.      Comments: No venous staining  No lesions bilateral lower extremities "   Neurological:      General: No focal deficit present.      Mental Status: She is alert and oriented to person, place, and time.   Psychiatric:         Speech: Speech normal.         Behavior: Behavior is cooperative.         Thought Content: Thought content normal.         Judgment: Judgment normal.       BUN   Date Value Ref Range Status   02/16/2022 14 8 - 23 mg/dL Final     Creatinine   Date Value Ref Range Status   02/16/2022 0.62 0.57 - 1.00 mg/dL Final     eGFR Non  Amer   Date Value Ref Range Status   02/16/2022 91 >60 mL/min/1.73 Final       ASSESSMENT:PLAN    1. PVD (peripheral vascular disease) (HCC)  Moderate reduction in right lower extremity perfusion.  Waveforms are consistent with aortoiliac disease.  Would require CTA runoff to evaluate for possible options of revascularization.    Denies lifestyle limiting claudication, ambulatory distance is limited by conditioning and strength rather than claudication.     Continue medical management aspirin, Plavix, she is intolerant to statins    Repeat IDALIA in one year    2. Bilateral carotid artery stenosis  Stable velocities post left transcarotid artery revascularization  No surgical intervention indicated at this time follow-up with interval imaging repeat carotid duplex in approximately 12 months    She remains on aspirin, Plavix    3. Mixed hyperlipidemia  Lipid-lowering therapy has been proven beneficial in patients with cardio-vascular disease. Current guidelines recommend statin treatment for all patients with PAD,CAD and carotid stenosis. Statins are beneficial in preventing cardiovascular events, increasing functional capacity and lower the risk of adverse limb loss in PAD. Statins decrease the progression of plaque formation and may improve peripheral vessel lining, and aid in reversing atherosclerosis.    Statin intolerant consider PCSK9 with family care provider  Lab Results   Component Value Date    CHLPL 194 07/18/2023    TRIG 127  07/18/2023    HDL 49 07/18/2023     07/18/2023       4. Smoker  Smoking cessation assistance options offered including behavioral counseling (Smoking Cessation Classes), Nicotine replacement therapy (patches or gum), pharmacologic therapy (Chantix, Wellbutrin). Understands tobacco increases risk of expanding AAA, MI, CVA, PAD, carcinoma. Discussion and question answer period 5-7 minutes.                This document has been electronically signed by REMI Alonzo on August 29, 2023 15:16 CDT

## 2023-08-29 NOTE — PATIENT INSTRUCTIONS
The results of your vascular studies of your right leg shows moderate disease with fair  circulation, in the left leg shows milddisease with fair  circulation.      If signs and symptoms of ischemia should occur including but not limited to pale/blue discoloration of limb, increasing pain with ambulation or at rest, or a non-healing wound. Patient is to notify [facility] for immediate evaluation.    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    Follow up in 12 months    If you should experience any neurological symptoms including but not limited to visual or speech disturbances confusion, seizures, or weakness of limbs of one side of your body notify [facility] center immediately for evaluation or if after hours present to the nearest Emergency Department.

## (undated) DEVICE — PTA BALLOON DILATATION CATHETER: Brand: STERLING™

## (undated) DEVICE — PACK,UNIVERSAL,NO GOWNS: Brand: MEDLINE

## (undated) DEVICE — PK VASC LF 60

## (undated) DEVICE — ST CVR PROB PULLUP ULTRASND 5X48IN

## (undated) DEVICE — PRESTO™ INFLATION DEVICE: Brand: PRESTO

## (undated) DEVICE — GW ENROUTE HC .014IN 95CM

## (undated) DEVICE — CATHETER,FOLEY,100%SILICONE,16FR,10ML,LF: Brand: MEDLINE

## (undated) DEVICE — ADHS SKIN PREMIERPRO EXOFIN TOPICAL HI/VISC .5ML

## (undated) DEVICE — PART NUMBER 108260, VTI 8 MHZ DISPOSABLE DOPPLER PROBE, STRAIGHT, BOX: Brand: VTI 8 MHZ DISPOSABLE DOPPLER PROBE, STRAIGHT, BOX

## (undated) DEVICE — DRSNG TELFA PAD NONADH STR 1S 3X8IN

## (undated) DEVICE — ANGLED-TIP ARTERIAL SHEATH CONFIGURATION: Brand: ENROUTE TRANSCAROTID NEUROPROTECTION SYSTEM

## (undated) DEVICE — KT INTRO MINISTICK MAX W/GW PALLADIUM ECHO 4F 21G 7CM

## (undated) DEVICE — 3M™ IOBAN™ 2 ANTIMICROBIAL INCISE DRAPE 6651EZ: Brand: IOBAN™ 2

## (undated) DEVICE — SUT VICRYL 3-0 SH-1 PO 18IN J772D

## (undated) DEVICE — CLTH CLENS READYCLEANSE PERI CARE PK/5

## (undated) DEVICE — STERILE POLYISOPRENE POWDER-FREE SURGICAL GLOVES: Brand: PROTEXIS

## (undated) DEVICE — SUT MONOCRYL 4/0 PS2 27IN Y426H ETY426H

## (undated) DEVICE — FOGARTY - HYDRAGRIP SURGICAL - CLAMP INSERTS: Brand: FOGARTY SOFTJAW

## (undated) DEVICE — TOTAL TRAY, 16FR 10ML SIL FOLEY, URN: Brand: MEDLINE

## (undated) DEVICE — SYR SLP TP 10ML DISP

## (undated) DEVICE — SPNG LAP 18X18IN LF STRL PK/5

## (undated) DEVICE — SYR 10ML

## (undated) DEVICE — PENCL ES MEGADINE EZ/CLEAN BUTN W/HOLSTR 10FT

## (undated) DEVICE — KT INTRO MIC TROC 4F 21GA 7CM

## (undated) DEVICE — SUT VICRYL 4/0 SUTUPAK 18 J109T

## (undated) DEVICE — DRSNG SURESITE WNDW 4X4.5

## (undated) DEVICE — TOWEL,OR,DSP,ST,BLUE,DLX,4/PK,20PK/CS: Brand: MEDLINE

## (undated) DEVICE — DECANT BG O JET

## (undated) DEVICE — GLV SURG SENSICARE GREEN W/ALOE PF LF 6 STRL

## (undated) DEVICE — COVER,C-ARM,41X74: Brand: MEDLINE

## (undated) DEVICE — SOL IRR NACL 0.9PCT BT 1000ML

## (undated) DEVICE — SYR LUERLOK 20CC BX/50

## (undated) DEVICE — GLV RAD REDUC ESP SZ8

## (undated) DEVICE — PROXIMATE SKIN STAPLERS (35 WIDE) CONTAINS 35 STAINLESS STEEL STAPLES (FIXED HEAD): Brand: PROXIMATE

## (undated) DEVICE — SUT SILK 2/0 FS BLK 18IN 685G

## (undated) DEVICE — PATIENT RETURN ELECTRODE, SINGLE-USE, CONTACT QUALITY MONITORING, ADULT, WITH 9FT CORD, FOR PATIENTS WEIGING OVER 33LBS. (15KG): Brand: MEGADYNE

## (undated) DEVICE — CVR SURG EQUIP BND RECTG 36X28

## (undated) DEVICE — STERILE POLYISOPRENE POWDER-FREE SURGICAL GLOVES WITH EMOLLIENT COATING: Brand: PROTEXIS

## (undated) DEVICE — SUT PROLN 5/0 C1 D/A 36IN 8720H

## (undated) DEVICE — SPNG DRN AMD EXCILON 6PLY 4X4IN PK/2